# Patient Record
Sex: FEMALE | Race: WHITE | NOT HISPANIC OR LATINO | Employment: FULL TIME | URBAN - METROPOLITAN AREA
[De-identification: names, ages, dates, MRNs, and addresses within clinical notes are randomized per-mention and may not be internally consistent; named-entity substitution may affect disease eponyms.]

---

## 2019-08-21 ENCOUNTER — TELEPHONE (OUTPATIENT)
Dept: BARIATRICS | Facility: CLINIC | Age: 44
End: 2019-08-21

## 2019-08-21 NOTE — TELEPHONE ENCOUNTER
----- Message from Brandy López sent at 8/21/2019  3:49 PM CDT -----  Contact: Self  Type:  Patient Returning Call    Who Called:  Patient   Who Left Message for Patient:  Ines  Does the patient know what this is regarding?:  scheduling  Best Call Back Number:  [phone] (home)   Additional Information: na

## 2019-08-21 NOTE — TELEPHONE ENCOUNTER
Called pt, scheduled appt. had lap band placed by meghana 4 years ago, wants out. confirmed location.

## 2019-09-11 ENCOUNTER — OFFICE VISIT (OUTPATIENT)
Dept: BARIATRICS | Facility: CLINIC | Age: 44
End: 2019-09-11
Payer: COMMERCIAL

## 2019-09-11 VITALS
HEIGHT: 66 IN | SYSTOLIC BLOOD PRESSURE: 118 MMHG | HEART RATE: 54 BPM | BODY MASS INDEX: 30.93 KG/M2 | TEMPERATURE: 98 F | RESPIRATION RATE: 16 BRPM | WEIGHT: 192.44 LBS | DIASTOLIC BLOOD PRESSURE: 77 MMHG

## 2019-09-11 DIAGNOSIS — Z98.890 S/P GASTRIC SURGERY: ICD-10-CM

## 2019-09-11 DIAGNOSIS — E66.9 OBESITY (BMI 30.0-34.9): Primary | ICD-10-CM

## 2019-09-11 DIAGNOSIS — R63.5 WEIGHT GAIN: Primary | ICD-10-CM

## 2019-09-11 PROCEDURE — 99203 PR OFFICE/OUTPT VISIT, NEW, LEVL III, 30-44 MIN: ICD-10-PCS | Mod: S$GLB,,, | Performed by: SURGERY

## 2019-09-11 PROCEDURE — 3008F PR BODY MASS INDEX (BMI) DOCUMENTED: ICD-10-PCS | Mod: CPTII,S$GLB,, | Performed by: SURGERY

## 2019-09-11 PROCEDURE — 99999 PR PBB SHADOW E&M-EST. PATIENT-LVL III: CPT | Mod: PBBFAC,,, | Performed by: SURGERY

## 2019-09-11 PROCEDURE — 3008F BODY MASS INDEX DOCD: CPT | Mod: CPTII,S$GLB,, | Performed by: SURGERY

## 2019-09-11 PROCEDURE — 99999 PR PBB SHADOW E&M-EST. PATIENT-LVL III: ICD-10-PCS | Mod: PBBFAC,,, | Performed by: SURGERY

## 2019-09-11 PROCEDURE — 99203 OFFICE O/P NEW LOW 30 MIN: CPT | Mod: S$GLB,,, | Performed by: SURGERY

## 2019-09-11 NOTE — PROGRESS NOTES
Initial Consult    Chief Complaint   Patient presents with    Obesity       History of Present Illness:  Patient is a 44 y.o. female who is referred for evaluation obesity. Her Body mass index is 31.54 kg/m². She has no known comorbidities related to her weight. She has attended the bariatric seminar and is most interested in band adjustments.    Lap band: 2015  Dr. Frias  Starting 265 pounds  Lowest weight 180 pounds  Current weight 192 pounds  Goal weight: 160 pounds    Filled 3 times, never unfilled     No restriction- portion sizes around 6 ounces  No reflux, no regurgitation      Review of patient's allergies indicates:   Allergen Reactions    Benadryl [diphenhydramine hcl] Hives    Chocolate flavor Nausea And Vomiting     HIVES    Codeine Nausea And Vomiting     HIVES       No current outpatient medications on file.     No current facility-administered medications for this visit.        History reviewed. No pertinent past medical history.  Past Surgical History:   Procedure Laterality Date    BREAST REDUCTION      BREAST SURGERY      KNEE SURGERY      LAPAROSCOPIC GASTRIC BANDING       Family History   Problem Relation Age of Onset    Heart disease Mother     Diabetes Mother     Obesity Mother     Diabetes Maternal Aunt     Diabetes Maternal Uncle     Diabetes Paternal Aunt     Diabetes Paternal Uncle     Cancer Maternal Grandmother     Cancer Maternal Grandfather     Cancer Paternal Grandmother     Cancer Paternal Grandfather     Obesity Sister     Obesity Sister      Social History     Tobacco Use    Smoking status: Former Smoker    Smokeless tobacco: Never Used   Substance Use Topics    Alcohol use: Yes    Drug use: Never        Review of Systems:  Review of Systems   Constitutional: Negative for chills, diaphoresis and fever.   HENT: Negative for congestion, sinus pressure, sneezing, sore throat, tinnitus and voice change.    Eyes: Negative for pain, redness and itching.  "  Respiratory: Negative for apnea, cough, choking, chest tightness, shortness of breath, wheezing and stridor.    Cardiovascular: Negative for chest pain, palpitations and leg swelling.   Gastrointestinal: Negative for abdominal pain, anal bleeding, constipation, diarrhea, nausea and vomiting.   Endocrine: Negative for cold intolerance and heat intolerance.   Genitourinary: Negative for difficulty urinating and dysuria.   Musculoskeletal: Negative for arthralgias, back pain and gait problem.   Skin: Negative for rash and wound.   Allergic/Immunologic: Negative for environmental allergies and food allergies.   Neurological: Negative for dizziness, light-headedness and headaches.   Hematological: Negative for adenopathy. Does not bruise/bleed easily.   Psychiatric/Behavioral: Negative for agitation and confusion.       Physical:     Vital Signs (Most Recent)  Temp: 98.1 °F (36.7 °C) (09/11/19 1050)  Pulse: (!) 54 (09/11/19 1050)  Resp: 16 (09/11/19 1050)  BP: 118/77 (09/11/19 1050)  5' 5.5" (1.664 m)  87.3 kg (192 lb 7.4 oz)     Body comp:  Fat Percent:  41.7 %  Fat Mass:  79 lb  FFM:  110.6 lb  TBW: 79 lb  TBW %:  41.7 %  BMR: 1540 kcal          Physical Exam:  Physical Exam   Constitutional: She is oriented to person, place, and time. She appears well-developed and well-nourished. No distress.   HENT:   Head: Normocephalic and atraumatic.   Mouth/Throat: No oropharyngeal exudate.   Eyes: Pupils are equal, round, and reactive to light. Conjunctivae and EOM are normal. No scleral icterus.   Neck: Normal range of motion. Neck supple. No JVD present. No tracheal deviation present. No thyromegaly present.   Cardiovascular: Normal rate, regular rhythm and normal heart sounds. Exam reveals no gallop and no friction rub.   No murmur heard.  Pulmonary/Chest: Effort normal and breath sounds normal. No stridor. No respiratory distress. She has no wheezes. She has no rales. She exhibits no tenderness.   Abdominal: Soft. Bowel " sounds are normal. She exhibits no distension and no mass. There is no tenderness. There is no rebound and no guarding.   Well healed incisions   Lap band in place     Musculoskeletal: Normal range of motion. She exhibits no edema or tenderness.   Lymphadenopathy:     She has no cervical adenopathy.   Neurological: She is alert and oriented to person, place, and time. No cranial nerve deficit.   Skin: Skin is warm and dry. No rash noted. She is not diaphoretic. No erythema.   Psychiatric: She has a normal mood and affect. Her behavior is normal.   Nursing note and vitals reviewed.      ASSESSMENT/PLAN:        1. Obesity (BMI 30.0-34.9)     2. S/P gastric surgery         Plan:  Nano White has obesity as their Body mass index is 31.54 kg/m².   She has a lap band and wants to start using it for weight loss again.  Her goal is 160 pounds.  She has a hx of morbid obesity being as high 265 pounds and she would like to avoid regaining any more weight.  I have explained the dieting process and while evaluated band.    She will need:    UGI possible band adjustment    Diet plan: high protein low carb- mainly meats and vegetables  Exercise plan: Cardiovascular exercise, get HR over 100 for 20 minutes 3 times per week.  Start multivitamin

## 2019-09-20 ENCOUNTER — TELEPHONE (OUTPATIENT)
Dept: BARIATRICS | Facility: CLINIC | Age: 44
End: 2019-09-20

## 2019-09-20 ENCOUNTER — HOSPITAL ENCOUNTER (OUTPATIENT)
Dept: RADIOLOGY | Facility: HOSPITAL | Age: 44
Discharge: HOME | End: 2019-09-20
Attending: SURGERY
Payer: COMMERCIAL

## 2019-09-20 DIAGNOSIS — R63.5 WEIGHT GAIN: ICD-10-CM

## 2019-09-20 NOTE — TELEPHONE ENCOUNTER
----- Message from Natalie Pride sent at 9/20/2019 12:20 PM CDT -----  Contact: patient  Patient calling to reschedule xray-   Stated she usually schedules through the office so is requesting a call back to sched at    [phone]

## 2019-09-30 ENCOUNTER — HOSPITAL ENCOUNTER (OUTPATIENT)
Dept: RADIOLOGY | Facility: HOSPITAL | Age: 44
Discharge: HOME | End: 2019-09-30
Attending: SURGERY
Payer: COMMERCIAL

## 2019-09-30 PROCEDURE — 43771 LAP REVISE GASTR ADJ DEVICE: CPT | Mod: ,,, | Performed by: RADIOLOGY

## 2019-09-30 PROCEDURE — 77002 NEEDLE LOCALIZATION BY XRAY: CPT | Mod: 26,59,, | Performed by: RADIOLOGY

## 2019-09-30 PROCEDURE — 77002 FL LAP BAND ADJUSTMENT UNDER FLUORO(ADDL PROC): ICD-10-PCS | Mod: 26,59,, | Performed by: RADIOLOGY

## 2019-09-30 PROCEDURE — 43771: ICD-10-PCS | Mod: ,,, | Performed by: RADIOLOGY

## 2019-09-30 PROCEDURE — 77002 NEEDLE LOCALIZATION BY XRAY: CPT | Mod: TC

## 2019-10-07 NOTE — PROGRESS NOTES
Date: 10/07/2019    Procedure: lap band adjustment    Indications: weight gain    Anesthesia: none     Findings: good restriction after adjustment    Procedure:  The band was found with the fluoroscope and directly accessed with a long hahn needle with fluoroscopic guidance.  The initial UGI showed the band in good position with little restriction of barium flow across the band.  The restriction improved after addition fluid  without signs of reflux or obstruction.  Patient was instructed to have liquids only for 48 hours.  The needle was removed and bandaid placed.    Dispo: home    Instructions: water for 48 hours    Tolerated procedure well, condition: good    No complications

## 2019-10-31 ENCOUNTER — TELEPHONE (OUTPATIENT)
Dept: BARIATRICS | Facility: CLINIC | Age: 44
End: 2019-10-31

## 2019-11-05 ENCOUNTER — TELEPHONE (OUTPATIENT)
Dept: BARIATRICS | Facility: CLINIC | Age: 44
End: 2019-11-05

## 2022-08-03 ENCOUNTER — OFFICE VISIT (OUTPATIENT)
Dept: SURGERY | Facility: CLINIC | Age: 47
End: 2022-08-03
Payer: COMMERCIAL

## 2022-08-03 VITALS
DIASTOLIC BLOOD PRESSURE: 83 MMHG | WEIGHT: 212.31 LBS | HEART RATE: 59 BPM | BODY MASS INDEX: 34.12 KG/M2 | TEMPERATURE: 98 F | SYSTOLIC BLOOD PRESSURE: 124 MMHG | RESPIRATION RATE: 16 BRPM | HEIGHT: 66 IN

## 2022-08-03 DIAGNOSIS — Z01.818 PRE-OP TESTING: ICD-10-CM

## 2022-08-03 DIAGNOSIS — E66.9 OBESITY (BMI 30.0-34.9): ICD-10-CM

## 2022-08-03 DIAGNOSIS — Z98.84 HX OF LAPAROSCOPIC ADJUSTABLE GASTRIC BANDING: Primary | ICD-10-CM

## 2022-08-03 PROCEDURE — 3079F PR MOST RECENT DIASTOLIC BLOOD PRESSURE 80-89 MM HG: ICD-10-PCS | Mod: CPTII,S$GLB,, | Performed by: SURGERY

## 2022-08-03 PROCEDURE — 1159F PR MEDICATION LIST DOCUMENTED IN MEDICAL RECORD: ICD-10-PCS | Mod: CPTII,S$GLB,, | Performed by: SURGERY

## 2022-08-03 PROCEDURE — 3008F BODY MASS INDEX DOCD: CPT | Mod: CPTII,S$GLB,, | Performed by: SURGERY

## 2022-08-03 PROCEDURE — 99213 OFFICE O/P EST LOW 20 MIN: CPT | Mod: S$GLB,,, | Performed by: SURGERY

## 2022-08-03 PROCEDURE — 99999 PR PBB SHADOW E&M-EST. PATIENT-LVL III: ICD-10-PCS | Mod: PBBFAC,,, | Performed by: SURGERY

## 2022-08-03 PROCEDURE — 3079F DIAST BP 80-89 MM HG: CPT | Mod: CPTII,S$GLB,, | Performed by: SURGERY

## 2022-08-03 PROCEDURE — 3074F SYST BP LT 130 MM HG: CPT | Mod: CPTII,S$GLB,, | Performed by: SURGERY

## 2022-08-03 PROCEDURE — 3074F PR MOST RECENT SYSTOLIC BLOOD PRESSURE < 130 MM HG: ICD-10-PCS | Mod: CPTII,S$GLB,, | Performed by: SURGERY

## 2022-08-03 PROCEDURE — 3008F PR BODY MASS INDEX (BMI) DOCUMENTED: ICD-10-PCS | Mod: CPTII,S$GLB,, | Performed by: SURGERY

## 2022-08-03 PROCEDURE — 99999 PR PBB SHADOW E&M-EST. PATIENT-LVL III: CPT | Mod: PBBFAC,,, | Performed by: SURGERY

## 2022-08-03 PROCEDURE — 99213 PR OFFICE/OUTPT VISIT, EST, LEVL III, 20-29 MIN: ICD-10-PCS | Mod: S$GLB,,, | Performed by: SURGERY

## 2022-08-03 PROCEDURE — 1159F MED LIST DOCD IN RCRD: CPT | Mod: CPTII,S$GLB,, | Performed by: SURGERY

## 2022-08-03 NOTE — PROGRESS NOTES
Starting to have reflux and trouble with foods getting stuck    Vitals:    08/03/22 0911   BP: 124/83   Pulse: (!) 59   Resp: 16   Temp: 98.4 °F (36.9 °C)     Abdomen benign and port in mid epigastrium    A/P    Lap band  Needs adjusted or removed  She is interested in removal and will discuss with   She will call to set date  She would like sleeve as well but does not qualify for it based on her weight.

## 2022-08-08 ENCOUNTER — TELEPHONE (OUTPATIENT)
Dept: BARIATRICS | Facility: CLINIC | Age: 47
End: 2022-08-08
Payer: COMMERCIAL

## 2022-08-08 RX ORDER — SODIUM CHLORIDE 9 MG/ML
INJECTION, SOLUTION INTRAVENOUS CONTINUOUS
Status: CANCELLED | OUTPATIENT
Start: 2022-08-09

## 2022-08-08 RX ORDER — PANTOPRAZOLE SODIUM 40 MG/10ML
40 INJECTION, POWDER, LYOPHILIZED, FOR SOLUTION INTRAVENOUS DAILY
Status: CANCELLED | OUTPATIENT
Start: 2022-08-09

## 2022-08-08 NOTE — TELEPHONE ENCOUNTER
Returned pt's call to schedule surgery. Pt agreed to date/time. Preadmit and post-op visits scheduled. Pt was requesting financial info, sent msg to  to request contacting the pt. Pt verbalized complete understanding.     ----- Message from Roselia Soni sent at 8/8/2022 10:07 AM CDT -----  Contact: Patient  Type:  Needs Medical Advice    Who Called: Patient      Would the patient rather a call back or a response via [facility]?  Call    Best Call Back Number: [phone] (home)     Additional Information: Patient needs to speak to the nurse about having her lap band removed

## 2022-08-16 ENCOUNTER — TELEPHONE (OUTPATIENT)
Dept: BARIATRICS | Facility: CLINIC | Age: 47
End: 2022-08-16
Payer: COMMERCIAL

## 2022-08-17 ENCOUNTER — HOSPITAL ENCOUNTER (OUTPATIENT)
Dept: PREADMISSION TESTING | Facility: HOSPITAL | Age: 47
Discharge: HOME | DRG: 328 | End: 2022-08-17
Attending: SURGERY
Payer: COMMERCIAL

## 2022-08-17 VITALS — BODY MASS INDEX: 32.62 KG/M2 | HEIGHT: 66 IN | WEIGHT: 203 LBS

## 2022-08-17 DIAGNOSIS — Z01.818 PREOP TESTING: Primary | ICD-10-CM

## 2022-08-17 LAB
ALBUMIN SERPL BCP-MCNC: 3.5 G/DL (ref 3.5–5.2)
ALP SERPL-CCNC: 50 U/L (ref 55–135)
ALT SERPL W/O P-5'-P-CCNC: 17 U/L (ref 10–44)
ANION GAP SERPL CALC-SCNC: 7 MMOL/L (ref 8–16)
AST SERPL-CCNC: 16 U/L (ref 10–40)
BASOPHILS # BLD AUTO: 0.04 K/UL (ref 0–0.2)
BASOPHILS NFR BLD: 0.8 % (ref 0–1.9)
BILIRUB SERPL-MCNC: 1 MG/DL (ref 0.1–1)
BUN SERPL-MCNC: 10 MG/DL (ref 6–20)
CALCIUM SERPL-MCNC: 9.3 MG/DL (ref 8.7–10.5)
CHLORIDE SERPL-SCNC: 107 MMOL/L (ref 95–110)
CO2 SERPL-SCNC: 25 MMOL/L (ref 23–29)
CREAT SERPL-MCNC: 0.7 MG/DL (ref 0.5–1.4)
DIFFERENTIAL METHOD: ABNORMAL
EOSINOPHIL # BLD AUTO: 0.1 K/UL (ref 0–0.5)
EOSINOPHIL NFR BLD: 1.4 % (ref 0–8)
ERYTHROCYTE [DISTWIDTH] IN BLOOD BY AUTOMATED COUNT: 13.9 % (ref 11.5–14.5)
EST. GFR  (NO RACE VARIABLE): >60 ML/MIN/1.73 M^2
GLUCOSE SERPL-MCNC: 89 MG/DL (ref 70–110)
HCT VFR BLD AUTO: 41.6 % (ref 37–48.5)
HGB BLD-MCNC: 14.1 G/DL (ref 12–16)
IMM GRANULOCYTES # BLD AUTO: 0.02 K/UL (ref 0–0.04)
IMM GRANULOCYTES NFR BLD AUTO: 0.4 % (ref 0–0.5)
LYMPHOCYTES # BLD AUTO: 1.3 K/UL (ref 1–4.8)
LYMPHOCYTES NFR BLD: 25 % (ref 18–48)
MCH RBC QN AUTO: 33.2 PG (ref 27–31)
MCHC RBC AUTO-ENTMCNC: 33.9 G/DL (ref 32–36)
MCV RBC AUTO: 98 FL (ref 82–98)
MONOCYTES # BLD AUTO: 0.5 K/UL (ref 0.3–1)
MONOCYTES NFR BLD: 10.2 % (ref 4–15)
NEUTROPHILS # BLD AUTO: 3.1 K/UL (ref 1.8–7.7)
NEUTROPHILS NFR BLD: 62.2 % (ref 38–73)
NRBC BLD-RTO: 0 /100 WBC
PLATELET # BLD AUTO: 247 K/UL (ref 150–450)
PMV BLD AUTO: 10.8 FL (ref 9.2–12.9)
POTASSIUM SERPL-SCNC: 3.9 MMOL/L (ref 3.5–5.1)
PROT SERPL-MCNC: 7.1 G/DL (ref 6–8.4)
RBC # BLD AUTO: 4.25 M/UL (ref 4–5.4)
SODIUM SERPL-SCNC: 139 MMOL/L (ref 136–145)
WBC # BLD AUTO: 5 K/UL (ref 3.9–12.7)

## 2022-08-17 PROCEDURE — 85025 COMPLETE CBC W/AUTO DIFF WBC: CPT | Performed by: SURGERY

## 2022-08-17 PROCEDURE — 80053 COMPREHEN METABOLIC PANEL: CPT | Performed by: SURGERY

## 2022-08-17 PROCEDURE — 36415 COLL VENOUS BLD VENIPUNCTURE: CPT | Performed by: SURGERY

## 2022-08-17 NOTE — DISCHARGE INSTRUCTIONS
To confirm, Your doctor has instructed you that surgery is scheduled for: 8/19/22   [place]  [phone]    Please report to [facility] the morning of surgery. You must check-in and receive a wristband before going to your procedure.    Pre-Op will call the afternoon prior to surgery between 1:00 and 6:00 PM with the final arrival time.  Phone number: [phone]    PLEASE NOTE:  The surgery schedule has many variables which may affect the time of your surgery case.  Family members should be available if your surgery time changes.  Plan to be here the day of your procedure between 4-6 hours.    MEDICATIONS:  TAKE ONLY THESE MEDICATIONS WITH A SMALL SIP OF WATER THE MORNING OF YOUR PROCEDURE    -0-    DO NOT TAKE THESE MEDICATIONS 5-7 DAYS PRIOR to your procedure or per your surgeon's request:   ASPIRIN, ALEVE, ADVIL, IBUPROFEN, FISH OIL VITAMIN E, HERBALS    (May take Tylenol)    ONLY if you are prescribed any types of blood thinners such as:  Aspirin, Coumadin, Plavix, Pradaxa, Xarelto, Aggrenox, Effient, Eliquis, Savasya, Brilinta, or any other, ask your surgeon whether you should stop taking them and how long before surgery you should stop.  You may also need to verify with the prescribing physician if it is ok to stop your medication.      INSTRUCTIONS IMPORTANT!!  Do not eat or drink anything between midnight and the time of your procedure- this includes gum, mints, and candy.  Do not smoke or drink alcoholic beverages 24 hours prior to your procedure.  Shower the night before AND the morning of your procedure with a Chlorhexidine wash such as Hibiclens or Dial antibacterial soap from the neck down.  Do not get it on your face or in your eyes.  You may use your own shampoo and face wash. This helps your skin to be as bacteria free as possible.    If you wear contact lenses, dentures, hearing aids or glasses, bring a container to put them in during surgery and give to a  family member for safe keeping.  Please leave all jewelry, piercing's and valuables at home.   DO NOT remove hair from the surgery site.  Do not shave the incision site unless you are given specific instructions to do so.    ONLY if you have been diagnosed with sleep apnea please bring your C-PAP machine.  ONLY if you wear home oxygen please bring your portable oxygen tank the day of your procedure.  ONLY if you have a history of OPEN HEART SURGERY you will need a clearance from your Cardiologist per Anesthesia.      ONLY for patients requiring bowel prep, written instructions will be given by your doctor's office.  ONLY if you have a neuro stimulator, please bring the controller with you the morning of surgery  ONLY if a type and screen test is needed before surgery, please return:  If your doctor has scheduled you for an overnight stay, bring a small overnight bag with any personal items you need.  Make arrangements in advance for transportation home by a responsible adult.  It is not safe to drive a vehicle during the 24 hours after anesthesia.       [facility] Visitor Policy  Effective July 25, 2022    [facility] will make masks available at entrances and will enforce mask wearing in all common and patient  care areas for employees, patients, and visitors. Masks must be worn properly, ensuring that the nose and mouth  are covered. Children under 2 years of age are excluded from this requirement.    [facility] will continue routine visitation for COVID-19 negative patients, including inpatients, outpatients, and  procedural areas. Visitors will be asked to leave if they exhibit symptoms of respiratory infection, have tested  positive for COVID -19 in the last ten days, have a pending COVID-19 test for symptoms, are unable or refuse  to wear a mask OR do not comply with current policy    All [facility] facilities and properties are tobacco free.  Smoking is NOT allowed.   If you have any questions about these  instructions, call Pre-Op Admit  Nursing at [phone] or the Pre-Op Day Surgery Unit at [phone].

## 2022-08-18 ENCOUNTER — ANESTHESIA EVENT (OUTPATIENT)
Dept: SURGERY | Facility: HOSPITAL | Age: 47
DRG: 328 | End: 2022-08-18
Payer: COMMERCIAL

## 2022-08-19 ENCOUNTER — HOSPITAL ENCOUNTER (OUTPATIENT)
Facility: HOSPITAL | Age: 47
Discharge: HOME | DRG: 328 | End: 2022-08-19
Attending: SURGERY | Admitting: SURGERY
Payer: COMMERCIAL

## 2022-08-19 ENCOUNTER — ANESTHESIA (OUTPATIENT)
Dept: SURGERY | Facility: HOSPITAL | Age: 47
DRG: 328 | End: 2022-08-19
Payer: COMMERCIAL

## 2022-08-19 VITALS
DIASTOLIC BLOOD PRESSURE: 66 MMHG | OXYGEN SATURATION: 100 % | WEIGHT: 208 LBS | SYSTOLIC BLOOD PRESSURE: 145 MMHG | HEART RATE: 50 BPM | TEMPERATURE: 98 F | HEIGHT: 65 IN | RESPIRATION RATE: 16 BRPM | BODY MASS INDEX: 34.66 KG/M2

## 2022-08-19 DIAGNOSIS — Z98.84 HX OF LAPAROSCOPIC ADJUSTABLE GASTRIC BANDING: ICD-10-CM

## 2022-08-19 DIAGNOSIS — Z01.818 PREOP TESTING: ICD-10-CM

## 2022-08-19 LAB
B-HCG UR QL: NEGATIVE
CTP QC/QA: YES

## 2022-08-19 PROCEDURE — C9113 INJ PANTOPRAZOLE SODIUM, VIA: HCPCS | Performed by: NURSE PRACTITIONER

## 2022-08-19 PROCEDURE — 36000708 HC OR TIME LEV III 1ST 15 MIN: Performed by: SURGERY

## 2022-08-19 PROCEDURE — 81025 URINE PREGNANCY TEST: CPT | Performed by: SURGERY

## 2022-08-19 PROCEDURE — 63600175 PHARM REV CODE 636 W HCPCS: Performed by: NURSE ANESTHETIST, CERTIFIED REGISTERED

## 2022-08-19 PROCEDURE — 27201423 OPTIME MED/SURG SUP & DEVICES STERILE SUPPLY: Performed by: SURGERY

## 2022-08-19 PROCEDURE — 25000003 PHARM REV CODE 250: Performed by: SURGERY

## 2022-08-19 PROCEDURE — 63600175 PHARM REV CODE 636 W HCPCS: Performed by: NURSE PRACTITIONER

## 2022-08-19 PROCEDURE — 71000033 HC RECOVERY, INTIAL HOUR: Performed by: SURGERY

## 2022-08-19 PROCEDURE — 99900103 DSU ONLY-NO CHARGE-INITIAL HR (STAT): Performed by: SURGERY

## 2022-08-19 PROCEDURE — 71000039 HC RECOVERY, EACH ADD'L HOUR: Performed by: SURGERY

## 2022-08-19 PROCEDURE — 25000003 PHARM REV CODE 250: Performed by: NURSE ANESTHETIST, CERTIFIED REGISTERED

## 2022-08-19 PROCEDURE — D9220A PRA ANESTHESIA: ICD-10-PCS | Mod: CRNA,,, | Performed by: NURSE ANESTHETIST, CERTIFIED REGISTERED

## 2022-08-19 PROCEDURE — 36000709 HC OR TIME LEV III EA ADD 15 MIN: Performed by: SURGERY

## 2022-08-19 PROCEDURE — 99900104 DSU ONLY-NO CHARGE-EA ADD'L HR (STAT): Performed by: SURGERY

## 2022-08-19 PROCEDURE — 37000008 HC ANESTHESIA 1ST 15 MINUTES: Performed by: SURGERY

## 2022-08-19 PROCEDURE — 25000003 PHARM REV CODE 250: Performed by: ANESTHESIOLOGY

## 2022-08-19 PROCEDURE — D9220A PRA ANESTHESIA: Mod: CRNA,,, | Performed by: NURSE ANESTHETIST, CERTIFIED REGISTERED

## 2022-08-19 PROCEDURE — 43774 PR LAP, REMOVE ADJUST GAST RESTRICT DEVICE/PORT: ICD-10-PCS | Mod: ,,, | Performed by: SURGERY

## 2022-08-19 PROCEDURE — 71000015 HC POSTOP RECOV 1ST HR: Performed by: SURGERY

## 2022-08-19 PROCEDURE — 11000001 HC ACUTE MED/SURG PRIVATE ROOM

## 2022-08-19 PROCEDURE — 43774 LAP RMVL GASTR ADJ ALL PARTS: CPT | Mod: ,,, | Performed by: SURGERY

## 2022-08-19 PROCEDURE — 37000009 HC ANESTHESIA EA ADD 15 MINS: Performed by: SURGERY

## 2022-08-19 PROCEDURE — D9220A PRA ANESTHESIA: Mod: ANES,,, | Performed by: ANESTHESIOLOGY

## 2022-08-19 PROCEDURE — D9220A PRA ANESTHESIA: ICD-10-PCS | Mod: ANES,,, | Performed by: ANESTHESIOLOGY

## 2022-08-19 RX ORDER — DEXAMETHASONE SODIUM PHOSPHATE 4 MG/ML
INJECTION, SOLUTION INTRA-ARTICULAR; INTRALESIONAL; INTRAMUSCULAR; INTRAVENOUS; SOFT TISSUE
Status: DISCONTINUED | OUTPATIENT
Start: 2022-08-19 | End: 2022-08-19

## 2022-08-19 RX ORDER — SODIUM CHLORIDE 9 MG/ML
INJECTION, SOLUTION INTRAVENOUS CONTINUOUS
Status: DISCONTINUED | OUTPATIENT
Start: 2022-08-19 | End: 2022-08-19

## 2022-08-19 RX ORDER — PROCHLORPERAZINE EDISYLATE 5 MG/ML
5 INJECTION INTRAMUSCULAR; INTRAVENOUS EVERY 30 MIN PRN
Status: DISCONTINUED | OUTPATIENT
Start: 2022-08-19 | End: 2022-08-19 | Stop reason: HOSPADM

## 2022-08-19 RX ORDER — PHENYLEPHRINE HYDROCHLORIDE 10 MG/ML
INJECTION INTRAVENOUS
Status: DISCONTINUED | OUTPATIENT
Start: 2022-08-19 | End: 2022-08-19

## 2022-08-19 RX ORDER — LIDOCAINE HYDROCHLORIDE 10 MG/ML
1 INJECTION, SOLUTION EPIDURAL; INFILTRATION; INTRACAUDAL; PERINEURAL ONCE
Status: DISCONTINUED | OUTPATIENT
Start: 2022-08-19 | End: 2022-08-19

## 2022-08-19 RX ORDER — LIDOCAINE HCL/PF 100 MG/5ML
SYRINGE (ML) INTRAVENOUS
Status: DISCONTINUED | OUTPATIENT
Start: 2022-08-19 | End: 2022-08-19

## 2022-08-19 RX ORDER — MIDAZOLAM HYDROCHLORIDE 1 MG/ML
INJECTION INTRAMUSCULAR; INTRAVENOUS
Status: DISCONTINUED | OUTPATIENT
Start: 2022-08-19 | End: 2022-08-19

## 2022-08-19 RX ORDER — HYDROMORPHONE HYDROCHLORIDE 2 MG/ML
0.2 INJECTION, SOLUTION INTRAMUSCULAR; INTRAVENOUS; SUBCUTANEOUS EVERY 5 MIN PRN
Status: DISCONTINUED | OUTPATIENT
Start: 2022-08-19 | End: 2022-08-19 | Stop reason: HOSPADM

## 2022-08-19 RX ORDER — SUCCINYLCHOLINE CHLORIDE 20 MG/ML
INJECTION INTRAMUSCULAR; INTRAVENOUS
Status: DISCONTINUED | OUTPATIENT
Start: 2022-08-19 | End: 2022-08-19

## 2022-08-19 RX ORDER — PROPOFOL 10 MG/ML
VIAL (ML) INTRAVENOUS
Status: DISCONTINUED | OUTPATIENT
Start: 2022-08-19 | End: 2022-08-19

## 2022-08-19 RX ORDER — OXYCODONE HYDROCHLORIDE 5 MG/1
5 TABLET ORAL
Status: DISCONTINUED | OUTPATIENT
Start: 2022-08-19 | End: 2022-08-19 | Stop reason: HOSPADM

## 2022-08-19 RX ORDER — BUPIVACAINE HYDROCHLORIDE AND EPINEPHRINE 5; 5 MG/ML; UG/ML
INJECTION, SOLUTION EPIDURAL; INTRACAUDAL; PERINEURAL
Status: DISCONTINUED | OUTPATIENT
Start: 2022-08-19 | End: 2022-08-19 | Stop reason: HOSPADM

## 2022-08-19 RX ORDER — HYDROCODONE BITARTRATE AND ACETAMINOPHEN 7.5; 325 MG/1; MG/1
1 TABLET ORAL EVERY 4 HOURS PRN
Qty: 20 TABLET | Refills: 0 | Status: SHIPPED | OUTPATIENT
Start: 2022-08-19 | End: 2022-09-08

## 2022-08-19 RX ORDER — KETOROLAC TROMETHAMINE 30 MG/ML
INJECTION, SOLUTION INTRAMUSCULAR; INTRAVENOUS
Status: DISCONTINUED | OUTPATIENT
Start: 2022-08-19 | End: 2022-08-19

## 2022-08-19 RX ORDER — PANTOPRAZOLE SODIUM 40 MG/10ML
40 INJECTION, POWDER, LYOPHILIZED, FOR SOLUTION INTRAVENOUS DAILY
Status: DISCONTINUED | OUTPATIENT
Start: 2022-08-19 | End: 2022-08-19 | Stop reason: HOSPADM

## 2022-08-19 RX ORDER — CEFAZOLIN SODIUM 2 G/50ML
2 SOLUTION INTRAVENOUS
Status: COMPLETED | OUTPATIENT
Start: 2022-08-19 | End: 2022-08-19

## 2022-08-19 RX ORDER — ONDANSETRON 4 MG/1
4 TABLET, ORALLY DISINTEGRATING ORAL EVERY 6 HOURS PRN
Qty: 30 TABLET | Refills: 0 | Status: SHIPPED | OUTPATIENT
Start: 2022-08-19 | End: 2022-09-08

## 2022-08-19 RX ORDER — FENTANYL CITRATE 50 UG/ML
INJECTION, SOLUTION INTRAMUSCULAR; INTRAVENOUS
Status: DISCONTINUED | OUTPATIENT
Start: 2022-08-19 | End: 2022-08-19

## 2022-08-19 RX ORDER — FENTANYL CITRATE 50 UG/ML
25 INJECTION, SOLUTION INTRAMUSCULAR; INTRAVENOUS EVERY 5 MIN PRN
Status: DISCONTINUED | OUTPATIENT
Start: 2022-08-19 | End: 2022-08-19 | Stop reason: HOSPADM

## 2022-08-19 RX ORDER — ACETAMINOPHEN 10 MG/ML
INJECTION, SOLUTION INTRAVENOUS
Status: DISCONTINUED | OUTPATIENT
Start: 2022-08-19 | End: 2022-08-19

## 2022-08-19 RX ORDER — ONDANSETRON HYDROCHLORIDE 2 MG/ML
INJECTION, SOLUTION INTRAMUSCULAR; INTRAVENOUS
Status: DISCONTINUED | OUTPATIENT
Start: 2022-08-19 | End: 2022-08-19

## 2022-08-19 RX ORDER — ROCURONIUM BROMIDE 10 MG/ML
INJECTION, SOLUTION INTRAVENOUS
Status: DISCONTINUED | OUTPATIENT
Start: 2022-08-19 | End: 2022-08-19

## 2022-08-19 RX ADMIN — SODIUM CHLORIDE, SODIUM GLUCONATE, SODIUM ACETATE, POTASSIUM CHLORIDE, MAGNESIUM CHLORIDE, SODIUM PHOSPHATE, DIBASIC, AND POTASSIUM PHOSPHATE: .53; .5; .37; .037; .03; .012; .00082 INJECTION, SOLUTION INTRAVENOUS at 09:08

## 2022-08-19 RX ADMIN — ROCURONIUM BROMIDE 30 MG: 10 INJECTION, SOLUTION INTRAVENOUS at 09:08

## 2022-08-19 RX ADMIN — PHENYLEPHRINE HYDROCHLORIDE 100 MCG: 10 INJECTION INTRAVENOUS at 09:08

## 2022-08-19 RX ADMIN — SUGAMMADEX 200 MG: 100 INJECTION, SOLUTION INTRAVENOUS at 09:08

## 2022-08-19 RX ADMIN — OXYCODONE 5 MG: 5 TABLET ORAL at 11:08

## 2022-08-19 RX ADMIN — LIDOCAINE HYDROCHLORIDE 100 MG: 20 INJECTION INTRAVENOUS at 08:08

## 2022-08-19 RX ADMIN — ACETAMINOPHEN 1000 MG: 10 INJECTION, SOLUTION INTRAVENOUS at 09:08

## 2022-08-19 RX ADMIN — ROCURONIUM BROMIDE 10 MG: 10 INJECTION, SOLUTION INTRAVENOUS at 09:08

## 2022-08-19 RX ADMIN — DEXAMETHASONE SODIUM PHOSPHATE 4 MG: 4 INJECTION, SOLUTION INTRA-ARTICULAR; INTRALESIONAL; INTRAMUSCULAR; INTRAVENOUS; SOFT TISSUE at 09:08

## 2022-08-19 RX ADMIN — CEFAZOLIN SODIUM 2 G: 2 SOLUTION INTRAVENOUS at 09:08

## 2022-08-19 RX ADMIN — ONDANSETRON 4 MG: 2 INJECTION INTRAMUSCULAR; INTRAVENOUS at 08:08

## 2022-08-19 RX ADMIN — MIDAZOLAM HYDROCHLORIDE 2 MG: 1 INJECTION, SOLUTION INTRAMUSCULAR; INTRAVENOUS at 08:08

## 2022-08-19 RX ADMIN — SODIUM CHLORIDE, SODIUM GLUCONATE, SODIUM ACETATE, POTASSIUM CHLORIDE, MAGNESIUM CHLORIDE, SODIUM PHOSPHATE, DIBASIC, AND POTASSIUM PHOSPHATE: .53; .5; .37; .037; .03; .012; .00082 INJECTION, SOLUTION INTRAVENOUS at 07:08

## 2022-08-19 RX ADMIN — SUCCINYLCHOLINE CHLORIDE 120 MG: 20 INJECTION, SOLUTION INTRAMUSCULAR; INTRAVENOUS; PARENTERAL at 09:08

## 2022-08-19 RX ADMIN — KETOROLAC TROMETHAMINE 30 MG: 30 INJECTION, SOLUTION INTRAMUSCULAR; INTRAVENOUS at 09:08

## 2022-08-19 RX ADMIN — PROPOFOL 150 MG: 10 INJECTION, EMULSION INTRAVENOUS at 08:08

## 2022-08-19 RX ADMIN — FENTANYL CITRATE 100 MCG: 50 INJECTION, SOLUTION INTRAMUSCULAR; INTRAVENOUS at 08:08

## 2022-08-19 RX ADMIN — PANTOPRAZOLE SODIUM 40 MG: 40 INJECTION, POWDER, LYOPHILIZED, FOR SOLUTION INTRAVENOUS at 08:08

## 2022-08-19 NOTE — PLAN OF CARE
Patient prepared for procedure.  No questions at this time.  Family at bedside.  Belongings placed in preop cabinet.  Purse and phone with .

## 2022-08-19 NOTE — PLAN OF CARE
Criteria met for transfer per anesthesia  Awake alert and oriented  No acute resp distress  Vs wnl and stable  Pain controlled  IV patent  Bandaids/steri strips to abdomen c/d/I  Tolerating po with no PONV  Bedside report given to RUSSEL Ashby

## 2022-08-19 NOTE — DISCHARGE SUMMARY
[facility]  Discharge Note  Short Stay    Procedure(s) (LRB):  REMOVAL, GASTRIC BAND, LAPAROSCOPIC (N/A)    OUTCOME: Patient tolerated treatment/procedure well without complication and is now ready for discharge.    DISPOSITION: Home or Self Care    FINAL DIAGNOSIS:  Complication of lap band    FOLLOWUP: In clinic    DISCHARGE INSTRUCTIONS:    Discharge Procedure Orders   Diet Adult Regular     Lifting restrictions     Ice to affected area     Notify your health care provider if you experience any of the following:  temperature >100.4     Notify your health care provider if you experience any of the following:  persistent nausea and vomiting or diarrhea     Notify your health care provider if you experience any of the following:  severe uncontrolled pain     Notify your health care provider if you experience any of the following:  redness, tenderness, or signs of infection (pain, swelling, redness, odor or green/yellow discharge around incision site)     Remove dressing in 48 hours     Activity as tolerated        TIME SPENT ON DISCHARGE: 5 minutes

## 2022-08-19 NOTE — DISCHARGE INSTRUCTIONS
"Remove band aids tonight  Keep white strips until they fall off  Shower over incisions daily with soap and water  Do not bath or get into a pool  Use Incentive Spirometer at Home      Discharge Instructions: After Your Surgery/Procedure  Youve just had surgery. During surgery you were given medicine called anesthesia to keep you relaxed and free of pain. After surgery you may have some pain or nausea. This is common. Here are some tips for feeling better and getting well after surgery.     Stay on schedule with your medication.   Going home  Your doctor or nurse will show you how to take care of yourself when you go home. He or she will also answer your questions. Have an adult family member or friend drive you home.      For your safety we recommend these precaution for the first 24 hours after your procedure:  Do not drive or use heavy equipment.  Do not make important decisions or sign legal papers.  Do not drink alcohol.  Have someone stay with you, if needed. He or she can watch for problems and help keep you safe.  Your concentration, balance, coordination, and judgement may be impaired for many hours after anesthesia.  Use caution when ambulating or standing up.     You may feel weak and "washed out" after anesthesia and surgery.      Subtle residual effects of general anesthesia or sedation with regional / local anesthesia can last more than 24 hours.  Rest for the remainder of the day or longer if your Doctor/Surgeon has advised you to do so.  Although you may feel normal within the first 24 hours, your reflexes and mental ability may be impaired without you realizing it.  You may feel dizzy, lightheaded or sleepy for 24 hours or longer.      Be sure to go to all follow-up visits with your doctor. And rest after your surgery for as long as your doctor tells you to.  Coping with pain  If you have pain after surgery, pain medicine will help you feel better. Take it as told, before pain becomes severe. Also, " ask your doctor or pharmacist about other ways to control pain. This might be with heat, ice, or relaxation. And follow any other instructions your surgeon or nurse gives you.  Tips for taking pain medicine  To get the best relief possible, remember these points:  Pain medicines can upset your stomach. Taking them with a little food may help.  Most pain relievers taken by mouth need at least 20 to 30 minutes to start to work.  Taking medicine on a schedule can help you remember to take it. Try to time your medicine so that you can take it before starting an activity. This might be before you get dressed, go for a walk, or sit down for dinner.  Constipation is a common side effect of pain medicines. Call your doctor before taking any medicines such as laxatives or stool softeners to help ease constipation. Also ask if you should skip any foods. Drinking lots of fluids and eating foods such as fruits and vegetables that are high in fiber can also help. Remember, do not take laxatives unless your surgeon has prescribed them.  Drinking alcohol and taking pain medicine can cause dizziness and slow your breathing. It can even be deadly. Do not drink alcohol while taking pain medicine.  Pain medicine can make you react more slowly to things. Do not drive or run machinery while taking pain medicine.  Your health care provider may tell you to take acetaminophen to help ease your pain. Ask him or her how much you are supposed to take each day. Acetaminophen or other pain relievers may interact with your prescription medicines or other over-the-counter (OTC) drugs. Some prescription medicines have acetaminophen and other ingredients. Using both prescription and OTC acetaminophen for pain can cause you to overdose. Read the labels on your OTC medicines with care. This will help you to clearly know the list of ingredients, how much to take, and any warnings. It may also help you not take too much acetaminophen. If you have  questions or do not understand the information, ask your pharmacist or health care provider to explain it to you before you take the OTC medicine.  Managing nausea  Some people have an upset stomach after surgery. This is often because of anesthesia, pain, or pain medicine, or the stress of surgery. These tips will help you handle nausea and eat healthy foods as you get better. If you were on a special food plan before surgery, ask your doctor if you should follow it while you get better. These tips may help:  Do not push yourself to eat. Your body will tell you when to eat and how much.  Start off with clear liquids and soup. They are easier to digest.  Next try semi-solid foods, such as mashed potatoes, applesauce, and gelatin, as you feel ready.  Slowly move to solid foods. Dont eat fatty, rich, or spicy foods at first.  Do not force yourself to have 3 large meals a day. Instead eat smaller amounts more often.  Take pain medicines with a small amount of solid food, such as crackers or toast, to avoid nausea.     Call your surgeon if  You still have pain an hour after taking medicine. The medicine may not be strong enough.  You feel too sleepy, dizzy, or groggy. The medicine may be too strong.  You have side effects like nausea, vomiting, or skin changes, such as rash, itching, or hives.       If you have obstructive sleep apnea  You were given anesthesia medicine during surgery to keep you comfortable and free of pain. After surgery, you may have more apnea spells because of this medicine and other medicines you were given. The spells may last longer than usual.   At home:  Keep using the continuous positive airway pressure (CPAP) device when you sleep. Unless your health care provider tells you not to, use it when you sleep, day or night. CPAP is a common device used to treat obstructive sleep apnea.  Talk with your provider before taking any pain medicine, muscle relaxants, or sedatives. Your provider will  tell you about the possible dangers of taking these medicines.  © 2517-4693 [employer]. [address] [place] [address], [place] [address]. All rights reserved. This information is not intended as a substitute for professional medical care. Always follow your healthcare professional's instructions.     Using an Incentive Spirometer    An incentive spirometer is a device that helps you do deep breathing exercises. These exercises expand your lungs, aid in circulation, and help prevent pneumonia. Deep breathing exercises also help you breathe better and improve the function of your lungs by:  Keeping your lungs clear  Strengthening your breathing muscles  Helping prevent respiratory complications or problems  The incentive spirometer gives you a way to take an active part in recover. A nurse or therapist will teach you breathing exercises. To do these exercises, you will breathe in through your mouth and not your nose. The incentive spirometer only works correctly if you breathe in through your mouth.  Steps to clear lungs  Step 1. Exhale normally. Then, inhale normally.  Relax and breathe out.  Step 2. Place your lips tightly around the mouthpiece.  Make sure the device is upright and not tilted.  Step 3. Inhale as much air as you can through the mouthpiece (don't breath through your nose).  Inhale slowly and deeply.  Hold your breath long enough to keep the balls or disk raised for at least 3 to 5 seconds, or as instructed by your healthcare provider.  Some spirometers have an indicator to let you know that you are breathing in too fast. If the indicator goes off, breathe in more slowly.  Step 4. Repeat the exercise regularly.  Do this exercise every hour while you're awake, or as instructed by your healthcare provider.  If you were taught deep breathing and coughing exercises, do them regularly as instructed by your healthcare provider.      Post op instructions for prevention of DVT  What is deep vein  thrombosis?  Deep vein thrombosis (DVT) is the medical term for blood clots in the deep veins of the leg.  These blood clots can be dangerous.  A DVT can block a blood vessel and keep blood from getting where it needs to go.  Another problem is that the clot can travel to other parts of the body such as the lungs.  A clot that travels to the lungs is called a pulmonary embolus (PE) and can cause serious problems with breathing which can lead to death.  Am I at risk for DVT/PE?  If you are not very active, you are at risk of DVT.  Anyone confined to bed, sitting for long periods of time, recovering from surgery, etc. increases the risk of DVT.  Other risk factors are cancer diagnosis, certain medications, estrogen replacement in any form,older age, obesity, pregnancy, smoking, history of clotting disorders, and dehydration.  How will I know if I have a DVT?  Swelling in the lower leg  Pain  Warmth, redness, hardness or bulging of the vein  If you have any of these symptoms, call your doctors office right away.  Some people will not have any symptoms until the clot moves to the lungs.  What are the symptoms of a PE?  Panting, shortness of breath, or trouble breathing  Sharp, knife-like chest pain when you breathe  Coughing or coughing up blood  Rapid heartbeat  If you have any of these symptoms or get worse quickly, call 911 for emergency treatment.  How can I prevent a DVT?  Avoid long periods of inactivity and dont cross your legs--get up and walk around every hour or so.  Stay active--walking after surgery is highly encouraged.  This means you should get out of the house and walk in the neighborhood.  Going up and down stairs will not impair healing (unless advised against such activity by your doctor).    Drink plenty of noncaffeinated, nonalcoholic fluids each day to prevent dehydration.  Wear special support stockings, if they have been advised by your doctor.  If you travel, stop at least once an hour and  walk around.  Avoid smoking (assistance with stopping is available through your healthcare provider)  Always notify your doctor if you are not able to follow the post operative instructions that are given to you at the time of discharge.  It may be necessary to prescribe one of the medications available to prevent DVT.     We hope your stay was comfortable as you heal now, mend and rest.    For we have enjoyed taking care of you by giving your our best.    And as you get better, by regaining your health and strength;   We count it as a privilege to have served you and hope your time at [facility] was well spent.      Thank  You!!!

## 2022-08-19 NOTE — ANESTHESIA POSTPROCEDURE EVALUATION
Anesthesia Post Evaluation    Patient: Nano White    Procedure(s) Performed: Procedure(s) (LRB):  REMOVAL, GASTRIC BAND, LAPAROSCOPIC (N/A)    Final Anesthesia Type: general      Patient location during evaluation: PACU  Patient participation: Yes- Able to Participate  Level of consciousness: awake and alert and oriented  Post-procedure vital signs: reviewed and stable  Pain management: adequate  Airway patency: patent    PONV status at discharge: No PONV  Anesthetic complications: no      Cardiovascular status: blood pressure returned to baseline  Respiratory status: unassisted, spontaneous ventilation and room air  Hydration status: euvolemic  Follow-up not needed.          Vitals Value Taken Time   /62 08/19/22 1110   Temp 36.6 °C (97.9 °F) 08/19/22 1025   Pulse 50 08/19/22 1113   Resp 33 08/19/22 1113   SpO2 95 % 08/19/22 1113   Vitals shown include unvalidated device data.      No case tracking events are documented in the log.      Pain/Suzanne Score: Suzanne Score: 9 (8/19/2022 10:45 AM)

## 2022-08-19 NOTE — ANESTHESIA PREPROCEDURE EVALUATION
08/19/2022  Nano White is a 47 y.o., female.      Pre-op Assessment    I have reviewed the Patient Summary Reports.     I have reviewed the Nursing Notes. I have reviewed the NPO Status.   I have reviewed the Medications.     Review of Systems  Anesthesia Hx:  No problems with previous Anesthesia Denies Hx of Anesthetic complications    Social:  Smoker    Cardiovascular:   Denies Hypertension.  Denies MI.  Denies CAD.    Denies CABG/stent.   Denies Angina.    Pulmonary:   Denies COPD.  Denies Asthma.  Denies Recent URI.    Renal/:   Denies Chronic Renal Disease.     Hepatic/GI:   Denies GERD. Denies Liver Disease.    Neurological:   Denies TIA. Denies CVA. Denies Seizures.    Endocrine:   Denies Diabetes. Denies Hypothyroidism.  Obesity / BMI > 30  Psych:   Denies Psychiatric History.          Physical Exam  General: Well nourished, Cooperative, Alert and Oriented    Airway:  Mallampati: II / II  Mouth Opening: Normal  TM Distance: 4 - 6 cm  Tongue: Normal    Dental:  Intact    Chest/Lungs:  Clear to auscultation, Normal Respiratory Rate    Heart:  Rate: Normal  Rhythm: Regular Rhythm  Sounds: Normal        Anesthesia Plan  Type of Anesthesia, risks & benefits discussed:    Anesthesia Type: Gen Natural Airway  Intra-op Monitoring Plan: Standard ASA Monitors  Induction:  IV  Informed Consent: Informed consent signed with the Patient and all parties understand the risks and agree with anesthesia plan.  All questions answered.   ASA Score: 2    Ready For Surgery From Anesthesia Perspective.     .

## 2022-08-19 NOTE — ANESTHESIA PROCEDURE NOTES
Intubation    Date/Time: 8/19/2022 9:00 AM  Performed by: Gurpreet Murphy Jr., CRNA  Authorized by: Damien Pacheco MD     Intubation:     Induction:  Intravenous    Intubated:  Postinduction    Mask Ventilation:  Easy mask    Attempts:  1    Attempted By:  CRNA    Method of Intubation:  Direct    Blade:  Jaqeuz 2    Laryngeal View Grade: Grade I - full view of cords      Difficult Airway Encountered?: No      Complications:  None    Airway Device:  Oral endotracheal tube    Airway Device Size:  7.5    Style/Cuff Inflation:  Cuffed (inflated to minimal occlusive pressure)    Inflation Amount (mL):  4    Tube secured:  22    Secured at:  The lips    Placement Verified By:  Capnometry    Complicating Factors:  None    Findings Post-Intubation:  BS equal bilateral and atraumatic/condition of teeth unchanged

## 2022-08-19 NOTE — PLAN OF CARE
Pt in phase II rec rates pain in abd 5/10  at bedside explained dc [id number] to pt and  both verb understanding  Will wheel to car via xavier clemons pt voids

## 2022-08-19 NOTE — H&P
Initial Consult         Chief Complaint   Patient presents with    Obesity         History of Present Illness:  Patient is a 44 y.o. female who has a lap band which is causing dysphagia and reflux.            Review of patient's allergies indicates:   Allergen Reactions    Benadryl [diphenhydramine hcl] Hives    Chocolate flavor Nausea And Vomiting       HIVES    Codeine Nausea And Vomiting       HIVES         Current Medications   No current outpatient medications on file.      No current facility-administered medications for this visit.             History reviewed. No pertinent past medical history.        Past Surgical History:   Procedure Laterality Date    BREAST REDUCTION        BREAST SURGERY        KNEE SURGERY        LAPAROSCOPIC GASTRIC BANDING                Family History   Problem Relation Age of Onset    Heart disease Mother      Diabetes Mother      Obesity Mother      Diabetes Maternal Aunt      Diabetes Maternal Uncle      Diabetes Paternal Aunt      Diabetes Paternal Uncle      Cancer Maternal Grandmother      Cancer Maternal Grandfather      Cancer Paternal Grandmother      Cancer Paternal Grandfather      Obesity Sister      Obesity Sister        Social History      Tobacco Use    Smoking status: Former Smoker    Smokeless tobacco: Never Used   Substance Use Topics    Alcohol use: Yes    Drug use: Never         Review of Systems:  Review of Systems   Constitutional: Negative for chills, diaphoresis and fever.   HENT: Negative for congestion, sinus pressure, sneezing, sore throat, tinnitus and voice change.    Eyes: Negative for pain, redness and itching.   Respiratory: Negative for apnea, cough, choking, chest tightness, shortness of breath, wheezing and stridor.    Cardiovascular: Negative for chest pain, palpitations and leg swelling.   Gastrointestinal: Negative for abdominal pain, anal bleeding, constipation, diarrhea, nausea and vomiting.   Endocrine: Negative for  "cold intolerance and heat intolerance.   Genitourinary: Negative for difficulty urinating and dysuria.   Musculoskeletal: Negative for arthralgias, back pain and gait problem.   Skin: Negative for rash and wound.   Allergic/Immunologic: Negative for environmental allergies and food allergies.   Neurological: Negative for dizziness, light-headedness and headaches.   Hematological: Negative for adenopathy. Does not bruise/bleed easily.   Psychiatric/Behavioral: Negative for agitation and confusion.         Physical:      Vital Signs (Most Recent)  Temp: 98.1 °F (36.7 °C) (09/11/19 1050)  Pulse: (!) 54 (09/11/19 1050)  Resp: 16 (09/11/19 1050)  BP: 118/77 (09/11/19 1050)  5' 5.5" (1.664 m)  87.3 kg (192 lb 7.4 oz)      Body comp:  Fat Percent:  41.7 %  Fat Mass:  79 lb  FFM:  110.6 lb  TBW: 79 lb  TBW %:  41.7 %  BMR: 1540 kcal              Physical Exam:  Physical Exam   Constitutional: She is oriented to person, place, and time. She appears well-developed and well-nourished. No distress.   HENT:   Head: Normocephalic and atraumatic.   Mouth/Throat: No oropharyngeal exudate.   Eyes: Pupils are equal, round, and reactive to light. Conjunctivae and EOM are normal. No scleral icterus.   Neck: Normal range of motion. Neck supple. No JVD present. No tracheal deviation present. No thyromegaly present.   Cardiovascular: Normal rate, regular rhythm and normal heart sounds. Exam reveals no gallop and no friction rub.   No murmur heard.  Pulmonary/Chest: Effort normal and breath sounds normal. No stridor. No respiratory distress. She has no wheezes. She has no rales. She exhibits no tenderness.   Abdominal: Soft. Bowel sounds are normal. She exhibits no distension and no mass. There is no tenderness. There is no rebound and no guarding.   Well healed incisions   Lap band in place     Musculoskeletal: Normal range of motion. She exhibits no edema or tenderness.   Lymphadenopathy:     She has no cervical adenopathy. "   Neurological: She is alert and oriented to person, place, and time. No cranial nerve deficit.   Skin: Skin is warm and dry. No rash noted. She is not diaphoretic. No erythema.   Psychiatric: She has a normal mood and affect. Her behavior is normal.   Nursing note and vitals reviewed.        ASSESSMENT/PLAN:      Lap band causing complications  Plan for removal.

## 2022-08-19 NOTE — TRANSFER OF CARE
"Anesthesia Transfer of Care Note    Patient: Nano White    Procedure(s) Performed: Procedure(s) (LRB):  REMOVAL, GASTRIC BAND, LAPAROSCOPIC (N/A)    Patient location: PACU    Anesthesia Type: general    Transport from OR: Transported from OR on 2-3 L/min O2 by NC with adequate spontaneous ventilation    Post pain: adequate analgesia    Post assessment: no apparent anesthetic complications and tolerated procedure well    Post vital signs: stable    Level of consciousness: awake, alert and oriented    Nausea/Vomiting: no nausea/vomiting    Complications: none    Transfer of care protocol was followed      Last vitals:   Visit Vitals  /71 (BP Location: Left arm, Patient Position: Lying)   Pulse (!) 52   Temp 36.4 °C (97.5 °F) (Skin)   Resp 20   Ht 5' 5" (1.651 m)   Wt 94.3 kg (208 lb)   LMP 08/19/2022 (Exact Date)   SpO2 99%   Breastfeeding No   BMI 34.61 kg/m²     "

## 2022-08-22 NOTE — OP NOTE
Lap band removal Procedure Note    Date of procedure:   8/19/22    Indications: 46 y/o lap band causing complication.    Pre-operative Diagnosis: complication from band    Post-operative Diagnosis: Same    Surgeon: Paresh Sampson MD    Assistants: none    Anesthesia: General endotracheal anesthesia    ASA Class: 3    Procedure Details   The patient was seen in the Holding Room. The risks, benefits, complications, treatment options, and expected outcomes were discussed with the patient. The possibilities of reaction to medication, pulmonary aspiration, perforation of viscus, bleeding, recurrent infection, the need for additional procedures, failure to diagnose a condition, and creating a complication requiring transfusion or operation were discussed with the patient. The patient concurred with the proposed plan, giving informed consent. The site of surgery properly noted/marked. The patient was taken to Operating Room  identified as Nano White and the procedure verified as lap band removal. A Time Out was held and the above information confirmed.    Full general anesthesia was induced with orotracheal intubation. The patient was prepped and draped in a supine position. Appropriate antibiotics were given intravenously. Arms were out.    Local anesthetic agent was injected into the skin near the right upper quadrant and an incision made. 5 mm optiview trocar was used to enter safely into the peritoneal cavity.  Pneumoperitoneum was then created with CO2 and tolerated well without any adverse changes in the patient's vital signs. Additional trocars were introduced under direct vision. All skin incisions were infiltrated with a local anesthetic agent before making the incision and placing the trocars.     Lap band was found with adhesions to the liver.  These were taken down using the cautery.  The band buckle was exposed.  This was cut and the band was unbuckled.  Portion of the band was then transected.  As well  as the tubing.  This placed the band in 3 pieces.  The band was then pulled from the band tunnel.  The scarring around the band was some deep was very difficult to undo the scarring.  Elected this point to leave in place.  The band tunnel was opened sharply in cautery were expected.  The band port was then identified and free from subcutaneous tissue.  It was pulled out under direct visualization to ensure no pieces of the band left behind.  The band was then read measured on the back table to make sure all portions had been removed.  The area was reinspected and the fascia was closed with Vicryl.  Skin was closed with Monocryl.  Patient tolerated the procedure well.    Instrument, sponge, and needle counts were correct prior to wound closure and at the conclusion of the case.     Findings:  Band with scarring    Estimated Blood Loss: 50.0 cc    Drains: none    Total IV Fluids: see anesthesia    Specimens: band    Implants: none    Complications:  None; patient tolerated the procedure well.    Disposition: PACU - hemodynamically stable.    Condition: stable    Attending Attestation: I was present and scrubbed for the entire procedure.

## 2022-08-23 ENCOUNTER — PATIENT OUTREACH (OUTPATIENT)
Dept: ADMINISTRATIVE | Facility: CLINIC | Age: 47
End: 2022-08-23
Payer: COMMERCIAL

## 2022-08-23 DIAGNOSIS — Z98.84 HISTORY OF REMOVAL OF LAPAROSCOPIC GASTRIC BANDING DEVICE: Primary | ICD-10-CM

## 2022-08-23 NOTE — PROGRESS NOTES
C3 nurse attempted to contact Nano White for a TCC post hospital discharge follow up call. No answer. The patient does not have a scheduled HOSFU appointment, and the pt does not have an [facility] PCP.

## 2022-08-24 ENCOUNTER — PES CALL (OUTPATIENT)
Dept: ADMINISTRATIVE | Facility: CLINIC | Age: 47
End: 2022-08-24
Payer: COMMERCIAL

## 2022-08-24 NOTE — PROGRESS NOTES
C3 nurse spoke with Nano White for a TCC post hospital discharge follow up call. The patient reports does not have a scheduled HOSFU appointment. C3 nurse was unable to schedule HOSFU appointment for Non-[facility] PCP. Patient advised to contact their PCP to schedule a HOSPFU within 5-7 days. NP referral placed

## 2022-08-30 ENCOUNTER — PES CALL (OUTPATIENT)
Dept: ADMINISTRATIVE | Facility: CLINIC | Age: 47
End: 2022-08-30
Payer: COMMERCIAL

## 2022-09-01 ENCOUNTER — PES CALL (OUTPATIENT)
Dept: ADMINISTRATIVE | Facility: CLINIC | Age: 47
End: 2022-09-01
Payer: COMMERCIAL

## 2022-09-08 ENCOUNTER — OFFICE VISIT (OUTPATIENT)
Dept: SURGERY | Facility: CLINIC | Age: 47
End: 2022-09-08
Payer: COMMERCIAL

## 2022-09-08 VITALS
DIASTOLIC BLOOD PRESSURE: 88 MMHG | BODY MASS INDEX: 34.04 KG/M2 | HEIGHT: 66 IN | HEART RATE: 58 BPM | WEIGHT: 211.81 LBS | RESPIRATION RATE: 16 BRPM | SYSTOLIC BLOOD PRESSURE: 132 MMHG | TEMPERATURE: 99 F

## 2022-09-08 DIAGNOSIS — E66.9 OBESITY (BMI 30.0-34.9): Primary | ICD-10-CM

## 2022-09-08 PROCEDURE — 3008F PR BODY MASS INDEX (BMI) DOCUMENTED: ICD-10-PCS | Mod: CPTII,S$GLB,, | Performed by: SURGERY

## 2022-09-08 PROCEDURE — 99999 PR PBB SHADOW E&M-EST. PATIENT-LVL III: ICD-10-PCS | Mod: PBBFAC,,, | Performed by: SURGERY

## 2022-09-08 PROCEDURE — 99999 PR PBB SHADOW E&M-EST. PATIENT-LVL III: CPT | Mod: PBBFAC,,, | Performed by: SURGERY

## 2022-09-08 PROCEDURE — 3079F PR MOST RECENT DIASTOLIC BLOOD PRESSURE 80-89 MM HG: ICD-10-PCS | Mod: CPTII,S$GLB,, | Performed by: SURGERY

## 2022-09-08 PROCEDURE — 3008F BODY MASS INDEX DOCD: CPT | Mod: CPTII,S$GLB,, | Performed by: SURGERY

## 2022-09-08 PROCEDURE — 99024 PR POST-OP FOLLOW-UP VISIT: ICD-10-PCS | Mod: S$GLB,,, | Performed by: SURGERY

## 2022-09-08 PROCEDURE — 3075F SYST BP GE 130 - 139MM HG: CPT | Mod: CPTII,S$GLB,, | Performed by: SURGERY

## 2022-09-08 PROCEDURE — 3079F DIAST BP 80-89 MM HG: CPT | Mod: CPTII,S$GLB,, | Performed by: SURGERY

## 2022-09-08 PROCEDURE — 3075F PR MOST RECENT SYSTOLIC BLOOD PRESS GE 130-139MM HG: ICD-10-PCS | Mod: CPTII,S$GLB,, | Performed by: SURGERY

## 2022-09-08 PROCEDURE — 99024 POSTOP FOLLOW-UP VISIT: CPT | Mod: S$GLB,,, | Performed by: SURGERY

## 2022-09-08 PROCEDURE — 1159F PR MEDICATION LIST DOCUMENTED IN MEDICAL RECORD: ICD-10-PCS | Mod: CPTII,S$GLB,, | Performed by: SURGERY

## 2022-09-08 PROCEDURE — 1159F MED LIST DOCD IN RCRD: CPT | Mod: CPTII,S$GLB,, | Performed by: SURGERY

## 2022-09-08 RX ORDER — AZELASTINE 1 MG/ML
SPRAY, METERED NASAL
COMMUNITY
Start: 2022-09-07 | End: 2022-12-05

## 2022-09-08 RX ORDER — FLUTICASONE PROPIONATE 50 MCG
SPRAY, SUSPENSION (ML) NASAL
COMMUNITY
Start: 2022-09-07 | End: 2023-02-07

## 2022-09-08 RX ORDER — TOPIRAMATE 25 MG/1
25 TABLET ORAL 2 TIMES DAILY
Qty: 60 TABLET | Refills: 0 | Status: SHIPPED | OUTPATIENT
Start: 2022-09-08 | End: 2022-12-05

## 2022-09-08 RX ORDER — BENZONATATE 100 MG/1
100 CAPSULE ORAL 3 TIMES DAILY
COMMUNITY
Start: 2022-09-07 | End: 2022-12-05

## 2022-09-08 NOTE — PROGRESS NOTES
Doing well  Eating what she wants    Vitals:    09/08/22 1633   BP: 132/88   Pulse: (!) 58   Resp: 16   Temp: 98.8 °F (37.1 °C)     Abdomen  Well healed incisions    A/P:    Start topamax  Plan for possible phentermine in about 1 month  Get on track with dieting- we went over the rules  Try to exercise at least 20 minutes 3 times per week

## 2022-09-12 ENCOUNTER — HOSPITAL ENCOUNTER (OUTPATIENT)
Dept: CARDIOLOGY | Facility: HOSPITAL | Age: 47
Discharge: HOME | End: 2022-09-12
Attending: SURGERY
Payer: COMMERCIAL

## 2022-09-12 ENCOUNTER — OFFICE VISIT (OUTPATIENT)
Dept: HOME HEALTH SERVICES | Facility: CLINIC | Age: 47
End: 2022-09-12
Payer: COMMERCIAL

## 2022-09-12 VITALS
WEIGHT: 211 LBS | HEIGHT: 66 IN | SYSTOLIC BLOOD PRESSURE: 150 MMHG | OXYGEN SATURATION: 98 % | TEMPERATURE: 98 F | RESPIRATION RATE: 16 BRPM | DIASTOLIC BLOOD PRESSURE: 70 MMHG | HEART RATE: 74 BPM | BODY MASS INDEX: 33.91 KG/M2

## 2022-09-12 DIAGNOSIS — F17.210 TOBACCO DEPENDENCE DUE TO CIGARETTES: Primary | ICD-10-CM

## 2022-09-12 DIAGNOSIS — E66.9 OBESITY (BMI 30.0-34.9): ICD-10-CM

## 2022-09-12 DIAGNOSIS — Z98.84 HISTORY OF REMOVAL OF LAPAROSCOPIC GASTRIC BANDING DEVICE: ICD-10-CM

## 2022-09-12 PROCEDURE — 93010 ELECTROCARDIOGRAM REPORT: CPT | Mod: ,,, | Performed by: INTERNAL MEDICINE

## 2022-09-12 PROCEDURE — 3078F DIAST BP <80 MM HG: CPT | Mod: CPTII,,, | Performed by: NURSE PRACTITIONER

## 2022-09-12 PROCEDURE — 93010 EKG 12-LEAD: ICD-10-PCS | Mod: ,,, | Performed by: INTERNAL MEDICINE

## 2022-09-12 PROCEDURE — 3008F BODY MASS INDEX DOCD: CPT | Mod: CPTII,,, | Performed by: NURSE PRACTITIONER

## 2022-09-12 PROCEDURE — 1111F DSCHRG MED/CURRENT MED MERGE: CPT | Mod: CPTII,,, | Performed by: NURSE PRACTITIONER

## 2022-09-12 PROCEDURE — 99350 HOME/RES VST EST HIGH MDM 60: CPT | Mod: 25,,, | Performed by: NURSE PRACTITIONER

## 2022-09-12 PROCEDURE — 3078F PR MOST RECENT DIASTOLIC BLOOD PRESSURE < 80 MM HG: ICD-10-PCS | Mod: CPTII,,, | Performed by: NURSE PRACTITIONER

## 2022-09-12 PROCEDURE — 3077F PR MOST RECENT SYSTOLIC BLOOD PRESSURE >= 140 MM HG: ICD-10-PCS | Mod: CPTII,,, | Performed by: NURSE PRACTITIONER

## 2022-09-12 PROCEDURE — 1111F PR DISCHARGE MEDS RECONCILED W/ CURRENT OUTPATIENT MED LIST: ICD-10-PCS | Mod: CPTII,,, | Performed by: NURSE PRACTITIONER

## 2022-09-12 PROCEDURE — 99406 PR TOBACCO USE CESSATION INTERMEDIATE 3-10 MINUTES: ICD-10-PCS | Mod: ,,, | Performed by: NURSE PRACTITIONER

## 2022-09-12 PROCEDURE — 3077F SYST BP >= 140 MM HG: CPT | Mod: CPTII,,, | Performed by: NURSE PRACTITIONER

## 2022-09-12 PROCEDURE — 93005 ELECTROCARDIOGRAM TRACING: CPT

## 2022-09-12 PROCEDURE — 99406 BEHAV CHNG SMOKING 3-10 MIN: CPT | Mod: ,,, | Performed by: NURSE PRACTITIONER

## 2022-09-12 PROCEDURE — 99350 PR HOME VISIT,ESTAB PATIENT,LEVEL IV: ICD-10-PCS | Mod: 25,,, | Performed by: NURSE PRACTITIONER

## 2022-09-12 PROCEDURE — 3008F PR BODY MASS INDEX (BMI) DOCUMENTED: ICD-10-PCS | Mod: CPTII,,, | Performed by: NURSE PRACTITIONER

## 2022-09-12 NOTE — ASSESSMENT & PLAN NOTE
Dangers of cigarette smoking were reviewed with patient in detail for 8 minutes and patient was encouraged to quit. Nicotine replacement options were discussed. Patient does not want to quit at this time.

## 2022-09-12 NOTE — PROGRESS NOTES
"  [facility]  Transition of Care Home Visit    Visit Date: 9/12/2022  Encounter Provider: Alanis Corona   PCP:  Zoran Bahena MD    PRESENTING HISTORY      Patient ID: Nano White is a 47 y.o. female.    Consult Requested By:  Dr. Zoran Bahena  Reason for Consult:  Hospital Follow Up.    Nano is being seen at home due to being seen at home due to physical debility that presents a taxing effort to leave the home, to mitigate high risk of hospital readmission and/or due to the limited availability of reliable or safe options for transportation to the point of access to the provider. Prior to treatment on this visit the chart was reviewed and patient verbal consent was obtained.      Chief Complaint: Transitional Care        History of Present Illness: Ms. Nano White is a 47 y.o. female who was recently admitted to the hospital.    Admit: 8/19/22  Discharge 8/19/22  Patient is a 44 y.o. female who has a lap band which is causing dysphagia and reflux.   ___________________________________________________________________    Today:    HPI:    Nano is being seen and examined at home today for transitional care visit in the home environment post-discharge from inpatient hospitalization encounter described above. Patient presents at baseline state of health as reported by patient and caregiver.     She is found at home alone today in no distress. Reports nausea has subsided since lap band removal. Reports saw surgeon 9/8/22 and is to have an EKG today for follow up. Reports some mild soreness and laproscopic sites but nothing that she needs to take medication for. Reports started back smoking cigarettes recently after quitting for 9 years, related to "stress". Not interested in quitting at this time.     VSS. Denies fever, chest pain, shortness of breath, nausea, vomiting, diarrhea. Denies any acute issues, concerns or complaints to address on today's visit. Reports taking all medications as prescribed. No " "other needs identified at this time. Risks of environmental exposure to coronavirus discussed including: social distancing, hand hygiene, and limiting departures from the home for necessities only.  Reports understanding and willingness to comply.           Review of Systems   Constitutional: Negative for chills, diaphoresis and fever.   HENT: Negative for congestion, sinus pressure, sneezing, sore throat, tinnitus and voice change.    Eyes: Negative for pain, redness and itching.   Respiratory: Negative for apnea, cough, choking, chest tightness, shortness of breath, wheezing and stridor.    Cardiovascular: Negative for chest pain, palpitations and leg swelling.   Gastrointestinal: Negative for abdominal pain, anal bleeding, constipation, diarrhea, nausea and vomiting.   Endocrine: Negative for cold intolerance and heat intolerance.   Genitourinary: Negative for difficulty urinating and dysuria.   Musculoskeletal: Negative for arthralgias, back pain and gait problem.   Skin: Negative for rash and wound.   Allergic/Immunologic: Negative for environmental allergies and food allergies.   Neurological: Negative for dizziness, light-headedness and headaches.   Hematological: Negative for adenopathy. Does not bruise/bleed easily.   Psychiatric/Behavioral: Negative for agitation and confusion.        Assessments:  Environmental: lives in single story home with her spouse. Home is clean with adequate lighting and temperature. 2 large pit bull dogs inside home.  Functional Status: independent  Safety: no issues identified  Nutritional: had adequate access, reports appetite is "getting better"  Home Health/DME/Supplies: No Home Health. No DME.    PAST HISTORY:     Past Medical History:   Diagnosis Date    H/O gastric bypass 2014       Past Surgical History:   Procedure Laterality Date    BREAST REDUCTION      BREAST SURGERY      KNEE SURGERY      LAPAROSCOPIC GASTRIC BANDING      LAPAROSCOPIC REMOVAL OF GASTRIC BAND N/A " 8/19/2022    Procedure: REMOVAL, GASTRIC BAND, LAPAROSCOPIC;  Surgeon: Paresh Sampson MD;  Location: [facility] OR;  Service: General;  Laterality: N/A;       Family History   Problem Relation Age of Onset    Heart disease Mother     Diabetes Mother     Obesity Mother     Diabetes Maternal Aunt     Diabetes Maternal Uncle     Diabetes Paternal Aunt     Diabetes Paternal Uncle     Cancer Maternal Grandmother     Cancer Maternal Grandfather     Cancer Paternal Grandmother     Cancer Paternal Grandfather     Obesity Sister     Obesity Sister        Social History     Socioeconomic History    Marital status:    Tobacco Use    Smoking status: Every Day     Packs/day: 0.50     Types: Cigarettes    Smokeless tobacco: Never    Tobacco comments:     Reports quit smoking for 9 years but recent restarted and not ready to quit at this time.    Substance and Sexual Activity    Alcohol use: Yes     Comment: Socially    Drug use: Never    Sexual activity: Yes     Partners: Male   Social History Narrative        Sales        MEDICATIONS & ALLERGIES:     Current Outpatient Medications on File Prior to Visit   Medication Sig Dispense Refill    azelastine (ASTELIN) 137 mcg (0.1 %) nasal spray SMARTSIG:Both Nares      benzonatate (TESSALON) 100 MG capsule Take 100 mg by mouth 3 (three) times daily.      fluticasone propionate (FLONASE) 50 mcg/actuation nasal spray by Each Nostril route.      topiramate (TOPAMAX) 25 MG tablet Take 1 tablet (25 mg total) by mouth 2 (two) times daily. 60 tablet 0     No current facility-administered medications on file prior to visit.        Review of patient's allergies indicates:   Allergen Reactions    Benadryl [diphenhydramine hcl] Hives    Chocolate flavor Nausea And Vomiting     HIVES    Codeine Nausea And Vomiting     HIVES       OBJECTIVE:     Vital Signs:  Vitals:    09/12/22 0800   BP: (!) 150/70   Pulse: 74   Resp: 16   Temp: 98.3 °F (36.8 °C)     Body mass index is 34.58 kg/m².      Physical Exam:  Constitutional: She is oriented to person, place, and time. She appears well-developed and well-nourished. No distress.   HENT:   Head: Normocephalic and atraumatic.   Mouth/Throat: No oropharyngeal exudate.   Eyes: Pupils are equal, round, and reactive to light. Conjunctivae and EOM are normal. No scleral icterus.   Neck: Normal range of motion. Neck supple. No JVD present. No tracheal deviation present. No thyromegaly present.   Cardiovascular: Normal rate, regular rhythm and normal heart sounds. Exam reveals no gallop and no friction rub.   No murmur heard.  Pulmonary/Chest: Effort normal and breath sounds normal. No stridor. No respiratory distress. She has no wheezes. She has no rales. She exhibits no tenderness.   Abdominal: Soft. Bowel sounds are normal. She exhibits no distension and no mass. There is no tenderness. There is no rebound and no guarding.   Well healed laproscopic incisions x 3.  Musculoskeletal: Normal range of motion. She exhibits no edema or tenderness.   Lymphadenopathy:     She has no cervical adenopathy.   Neurological: She is alert and oriented to person, place, and time. No cranial nerve deficit.   Skin: Skin is warm and dry. No rash noted. She is not diaphoretic. No erythema.   Psychiatric: She has a normal mood and affect. Her behavior is normal.       Laboratory  Lab Results   Component Value Date    WBC 5.00 08/17/2022    HGB 14.1 08/17/2022    HCT 41.6 08/17/2022    MCV 98 08/17/2022     08/17/2022     No results found for: INR, PROTIME  No results found for: HGBA1C  No results for input(s): POCTGLUCOSE in the last 72 hours.    Diagnostic Results:  Reviewed    TRANSITION OF CARE:     [facility] Contact Note: 8/23/22    Family and/or Caretaker present at visit?  No.  Diagnostic tests reviewed/disposition: No diagnosic tests pending after this hospitalization.  Disease/illness education: Gastric lap band removal, smoking cessation  Home  health/community services discussion/referrals: Patient does not have home health established from hospital visit.  They do not need home health.  If needed, we will set up home health for the patient.   Establishment or re-establishment of referral orders for community resources: No other necessary community resources.   Discussion with other health care providers: No discussion with other health care providers necessary.     Transition of Care Visit:  I have reviewed and updated the history and problem list.  I have reconciled the medication list.  I have discussed the hospitalization and current medical issues, prognosis and plans with the patient/family.  I  spent more than 50% of time discussing the care with the patient/family.  Total Face-to-Face Encounter: 70 minutes.    Medications Reconciliation:   I have reconciled the patient's home medications and discharge medications with the patient/family. I have updated all changes.  Refer to After-Visit Medication List.    8 minutes of visit spend discussing effects of tobacco on body systems, risks of not quitting smoking, benefits of quitting smoking and options available such as patches, gum, counseling. Patient not ready to quit at this time.      ASSESSMENT & PLAN:         1. Tobacco dependence due to cigarettes [F17.210 (ICD-10-CM)]  Assessment & Plan:  Dangers of cigarette smoking were reviewed with patient in detail for 8 minutes and patient was encouraged to quit. Nicotine replacement options were discussed. Patient does not want to quit at this time.              2. History of removal of laparoscopic gastric banding device  Assessment & Plan:  8/19/22 Gastric lap band removal due to reflux and dysphagia.   Followed by Surgeon.     Orders:  -     Ambulatory referral/consult to [facility]    3. Obesity (BMI 30.0-34.9)  Assessment & Plan:  Chronic.  Recent gastric lap band removal due to reflux and dysphagia.  Watch caloric intake, increase  activity.         Were controlled substances prescribed?  No      Scheduled Follow-up :  Future Appointments   Date Time Provider Department Center   10/13/2022  3:15 PM Paresh Sampson MD [facility] [facility]       After Visit Medication List :     Medication List            Accurate as of September 12, 2022  3:24 PM. If you have any questions, ask your nurse or doctor.                CONTINUE taking these medications      azelastine 137 mcg (0.1 %) nasal spray  Commonly known as: ASTELIN     benzonatate 100 MG capsule  Commonly known as: TESSALON     fluticasone propionate 50 mcg/actuation nasal spray  Commonly known as: FLONASE     topiramate 25 MG tablet  Commonly known as: TOPAMAX  Take 1 tablet (25 mg total) by mouth 2 (two) times daily.              Signature:  Alanis Corona NP

## 2022-09-12 NOTE — ASSESSMENT & PLAN NOTE
Chronic.  Recent gastric lap band removal due to reflux and dysphagia.  Watch caloric intake, increase activity.

## 2022-09-23 ENCOUNTER — TELEPHONE (OUTPATIENT)
Dept: BARIATRICS | Facility: CLINIC | Age: 47
End: 2022-09-23
Payer: COMMERCIAL

## 2022-09-23 NOTE — TELEPHONE ENCOUNTER
Returned call to pt with advisement from Ronna Pride NP to stop Topamax. No answer, LMOM to call us back.       ----- Message from Ronna Pride NP sent at 9/23/2022  8:06 AM CDT -----  Contact: pt [phone]  Tell her to stop taking  ----- Message -----  From: Renu Hwang RN  Sent: 9/22/2022   1:46 PM CDT  To: Ronna Pride NP    Should I advise her to stop taking?  ----- Message -----  From: Janine Agarwal  Sent: 9/22/2022  12:26 PM CDT  To: [facility] Staff    Stated that the following Rx is causing nausea and lightheadedness.    topiramate (TOPAMAX) 25 MG tablet    Please call and advise.    Thank You

## 2022-09-26 ENCOUNTER — TELEPHONE (OUTPATIENT)
Dept: BARIATRICS | Facility: CLINIC | Age: 47
End: 2022-09-26
Payer: COMMERCIAL

## 2022-09-26 NOTE — TELEPHONE ENCOUNTER
Returned pt's call. Instructed her to stop Topamax due to nausea and lightheadedness, per Ronna Pride NP. Pt also rescheduled her upcoming appt due to conflict with work schedule. Pt agreed to new date and time.       ----- Message from Kely Richardson sent at 9/23/2022 10:52 AM CDT -----  Contact: Patient  Type: Patient Call Back         Who called: Patient         What is the request in detail: returning missed call; regarding Topamax Rx;  please advise         Best call back number: [phone]         Additional Information:   "[facility] [email or web address] #[id number] - [place] - [address] AT [facility] [address]  [facility] [place] [address]  Phone: [phone] Fax: [phone]             Thank You

## 2022-10-18 ENCOUNTER — OFFICE VISIT (OUTPATIENT)
Dept: BARIATRICS | Facility: CLINIC | Age: 47
End: 2022-10-18
Payer: COMMERCIAL

## 2022-10-18 VITALS
TEMPERATURE: 98 F | DIASTOLIC BLOOD PRESSURE: 88 MMHG | HEART RATE: 65 BPM | BODY MASS INDEX: 35.1 KG/M2 | RESPIRATION RATE: 16 BRPM | SYSTOLIC BLOOD PRESSURE: 154 MMHG | WEIGHT: 218.38 LBS | HEIGHT: 66 IN

## 2022-10-18 DIAGNOSIS — E66.01 MORBID OBESITY: ICD-10-CM

## 2022-10-18 DIAGNOSIS — G47.33 OSA (OBSTRUCTIVE SLEEP APNEA): ICD-10-CM

## 2022-10-18 DIAGNOSIS — G47.33 OBSTRUCTIVE SLEEP APNEA OF ADULT: Primary | ICD-10-CM

## 2022-10-18 PROCEDURE — 3077F PR MOST RECENT SYSTOLIC BLOOD PRESSURE >= 140 MM HG: ICD-10-PCS | Mod: CPTII,S$GLB,, | Performed by: SURGERY

## 2022-10-18 PROCEDURE — 99213 OFFICE O/P EST LOW 20 MIN: CPT | Mod: 24,S$GLB,, | Performed by: SURGERY

## 2022-10-18 PROCEDURE — 1159F PR MEDICATION LIST DOCUMENTED IN MEDICAL RECORD: ICD-10-PCS | Mod: CPTII,S$GLB,, | Performed by: SURGERY

## 2022-10-18 PROCEDURE — 3077F SYST BP >= 140 MM HG: CPT | Mod: CPTII,S$GLB,, | Performed by: SURGERY

## 2022-10-18 PROCEDURE — 99999 PR PBB SHADOW E&M-EST. PATIENT-LVL III: ICD-10-PCS | Mod: PBBFAC,,, | Performed by: SURGERY

## 2022-10-18 PROCEDURE — 99999 PR PBB SHADOW E&M-EST. PATIENT-LVL III: CPT | Mod: PBBFAC,,, | Performed by: SURGERY

## 2022-10-18 PROCEDURE — 1159F MED LIST DOCD IN RCRD: CPT | Mod: CPTII,S$GLB,, | Performed by: SURGERY

## 2022-10-18 PROCEDURE — 3079F DIAST BP 80-89 MM HG: CPT | Mod: CPTII,S$GLB,, | Performed by: SURGERY

## 2022-10-18 PROCEDURE — 3079F PR MOST RECENT DIASTOLIC BLOOD PRESSURE 80-89 MM HG: ICD-10-PCS | Mod: CPTII,S$GLB,, | Performed by: SURGERY

## 2022-10-18 PROCEDURE — 99213 PR OFFICE/OUTPT VISIT, EST, LEVL III, 20-29 MIN: ICD-10-PCS | Mod: 24,S$GLB,, | Performed by: SURGERY

## 2022-10-18 NOTE — PROGRESS NOTES
Topamax made her nauseated and made her pass out once- she stopped  Travelling very much    Vitals:    10/18/22 0854   BP: (!) 154/88   Pulse: 65   Resp: 16   Temp: 98.2 °F (36.8 °C)     Abdominal pain    A/P    She is eating healthy although she is going out to eat.  I do suspect some of this may be related to portion sizes.  She does relate that she has been having some alcoholic beverages which may also be contributing to the weight going up.  She is been doing some exercising.  This is mainly walking.    She currently qualifies weight wise for bariatric surgery and is interested in pursuing this.  She is also worried that she has sleep apnea, feels like she stops breath, loud snoring.  Her snoring is significant enough that her partner sleeps in a different room.  She does feel like she is choking at night.  She does feel daytime fatigue.    We have been working on dieting for a few months and will continue this.  She does not use Topamax as it caused some severe side effects.  We should not use phentermine as she is currently smoking and is planning to stop.  We can consider when she stops.    Cannot take topamax for side effects  Avoiding phentermine for nicotine currently  Considering sleeve if sleep study is positive

## 2022-10-19 ENCOUNTER — TELEPHONE (OUTPATIENT)
Dept: SURGERY | Facility: HOSPITAL | Age: 47
End: 2022-10-19
Payer: COMMERCIAL

## 2022-10-19 DIAGNOSIS — G47.33 OBSTRUCTIVE SLEEP APNEA OF ADULT: Primary | ICD-10-CM

## 2022-10-20 ENCOUNTER — PATIENT MESSAGE (OUTPATIENT)
Dept: PSYCHIATRY | Facility: CLINIC | Age: 47
End: 2022-10-20
Payer: COMMERCIAL

## 2022-11-03 ENCOUNTER — PROCEDURE VISIT (OUTPATIENT)
Dept: SLEEP MEDICINE | Facility: HOSPITAL | Age: 47
End: 2022-11-03
Attending: SURGERY
Payer: COMMERCIAL

## 2022-11-03 DIAGNOSIS — G47.33 OBSTRUCTIVE SLEEP APNEA OF ADULT: ICD-10-CM

## 2022-11-03 PROCEDURE — 95806 SLEEP STUDY UNATT&RESP EFFT: CPT

## 2022-11-04 ENCOUNTER — PATIENT MESSAGE (OUTPATIENT)
Dept: PSYCHIATRY | Facility: CLINIC | Age: 47
End: 2022-11-04
Payer: COMMERCIAL

## 2022-11-04 NOTE — PATIENT INSTRUCTIONS
[facility]  [address] [place] [address], [address]  [place] [address]    Phone: [phone]        Fax: [phone]    Please call the sleep center: [phone]    Repeat   SLEEP STUDY  [] (Schedule per Doctors order)    C-PAP TITRATION  [] (Schedule 1-2 weeks after Sleep Study)    PAP-NAP   [] (Schedule per Doctors order)    CPAP SETUP - Will be notified 5 - 10 business days upon completion of study    **FOLLOW-UP VISIT AFTER CPAP TITRATION**    SCHEDULE YOUR FOLLOW-UP VISIT WITH DR. GARCIA 6 WEEKS     **AFTER USING YOUR EQUIPMENT**    IF YOU ARE AN [facility] CLINIC PATIENT PLEASE CALL: [phone]  IF YOU ARE AN DR. GARCIA PATIENT AT [facility] PLEASE CALL: [phone]    ADDITIONAL INSTRUCTIONS FOR SCHEDULING YOUR APPOINTMENT:  Please bring SD chip or CPAP machine    *Prescriptons for CPAP and medications will be delayed in the event that Dr. Garcia is out of the office     RECORD DATE AND TIME OF SCHEDULED APPOINTMENT    ___________________________________________________    You will receive a written confirmation of your scheduled appointment  with instructions by mail in five to seven days.

## 2022-11-08 ENCOUNTER — CLINICAL SUPPORT (OUTPATIENT)
Dept: BARIATRICS | Facility: CLINIC | Age: 47
End: 2022-11-08

## 2022-11-08 DIAGNOSIS — Z71.3 DIETARY COUNSELING AND SURVEILLANCE: Primary | ICD-10-CM

## 2022-11-08 DIAGNOSIS — E66.01 MORBID OBESITY: ICD-10-CM

## 2022-11-08 PROCEDURE — 99999 PR PBB SHADOW E&M-EST. PATIENT-LVL III: CPT | Mod: PBBFAC,,, | Performed by: DIETITIAN, REGISTERED

## 2022-11-08 PROCEDURE — 99999 PR PBB SHADOW E&M-EST. PATIENT-LVL III: ICD-10-PCS | Mod: PBBFAC,,, | Performed by: DIETITIAN, REGISTERED

## 2022-11-08 PROCEDURE — 97802 PR MED NUTR THER, 1ST, INDIV, EA 15 MIN: ICD-10-PCS | Mod: S$GLB,,, | Performed by: DIETITIAN, REGISTERED

## 2022-11-08 PROCEDURE — 97802 MEDICAL NUTRITION INDIV IN: CPT | Mod: S$GLB,,, | Performed by: DIETITIAN, REGISTERED

## 2022-11-09 VITALS — WEIGHT: 221.44 LBS | HEIGHT: 66 IN | BODY MASS INDEX: 35.59 KG/M2

## 2022-11-10 NOTE — PROGRESS NOTES
NUTRITIONAL CONSULT    Referring Physician: Dr. Sampson  Reason for MNT Referral: Initial assessment for sleeve gastrectomy work-up    PAST MEDICAL HISTORY:   47 y.o. female presents with a BMI of Body mass index is 36.29 kg/m²..    CLINICAL DATA:  47 y.o.-year-old White female.  Height: 5'5.5  Weight: 221 lbs  IBW: 127 lbs  BMI: 36.29  The patient's goal weight (50 % EBW): 174 lbs      Goal for Bariatric Surgery: to lose weight    NUTRITION & HEALTH HISTORY:  Greatest challenge: dining out frequency, grazes instead of meals    Current diet recall: Breakfast: coffee only, tuna packet, pepperoni + mozzarella  Lunch: same choices as breakfast or mini Kind bar.  Patient reports she mainly snacks on these foods compared to eating a full meal.  Dinner: fish or shrimp + vegetable  No snacking after dinner    Current Diet:  Meal pattern: irregular.  Protein supplements: 0-1. Atkins iced mocha (15 g protein)  Snacking: snacks instead of lunch. No other snacking.  Vegetables: Likes a variety. Eats almost daily.  Fruits: Likes a variety. Eats 2-3 times per week.  Beverages: sugar-free beverages, diet tea, and coffee without sugar  Dining out: Daily. Mostly fast food and restaurants.  Cooking at home: Daily. Mostly baked and grilled meat, fish, and vegetables.    Exercise:  Past exercise: None    Current exercise: None  Restrictions to exercise: none    Vitamins / Minerals / Herbs: none    Food Allergies:   Chocolate or chocolate flavoring    Social:  Works regular daytime shifts. Travels for work and long hours.  Lives with .  Grocery shopping and food prep self.  Patient believes the household will be supportive after surgery.  Alcohol: Socially.  Smoking: None.    ASSESSMENT:  Patient reports attempts at weight loss, only to regain lost weight.  Patient demonstrated knowledge of healthy eating behaviors and exercise patterns; admits to not eating healthy and not exercising at this point.  Patient states willingness  to change lifestyle and make behavior modifications.  Expect fair  compliance after surgery at this time.    Insurance requires medically supervised diet prior to consideration for bariatric surgery.    BARIATRIC DIET DISCUSSION:  Discussed diet after surgery and related to patients food record.  Reviewed diet progression before and after surgery.  Stressed importance of exercise and its role in achieving weight loss goals.  Answered all questions.    RECOMMENDATIONS:  Patient is a potential candidate for bariatric surgery.    Needs additional visit(s) with RD.    PLAN:  Resume work-up for surgery.  Continue to review Bariatric Nutrition Guidebook at home and call with any questions.  Work on Bariatric Nutrition Checklist.  Start including protein supplements in the diet plan daily.  Reduce frequency of dining out.  More grocery shopping and meal preparation at home.  Start MVI with iron.    SESSION TIME:  60 minutes

## 2022-11-11 ENCOUNTER — OFFICE VISIT (OUTPATIENT)
Dept: PSYCHIATRY | Facility: CLINIC | Age: 47
End: 2022-11-11
Payer: COMMERCIAL

## 2022-11-11 DIAGNOSIS — Z01.818 PREOPERATIVE EVALUATION TO RULE OUT SURGICAL CONTRAINDICATION: Primary | ICD-10-CM

## 2022-11-11 DIAGNOSIS — E66.01 MORBID OBESITY: ICD-10-CM

## 2022-11-11 PROCEDURE — 99999 PR PBB SHADOW E&M-EST. PATIENT-LVL II: CPT | Mod: PBBFAC,,, | Performed by: PSYCHOLOGIST

## 2022-11-11 PROCEDURE — 96138 PR PSYCH/NEUROPSYCH TEST ADMIN/SCORING, BY TECH, 2+ TESTS, 1ST 30 MIN: ICD-10-PCS | Mod: 95,,, | Performed by: PSYCHOLOGIST

## 2022-11-11 PROCEDURE — 96131 PR PSYCHOLOGIC TEST EVAL SVCS, EA ADDTL HR: ICD-10-PCS | Mod: 95,,, | Performed by: PSYCHOLOGIST

## 2022-11-11 PROCEDURE — 96138 PSYCL/NRPSYC TECH 1ST: CPT | Mod: 95,,, | Performed by: PSYCHOLOGIST

## 2022-11-11 PROCEDURE — 99999 PR PBB SHADOW E&M-EST. PATIENT-LVL II: ICD-10-PCS | Mod: PBBFAC,,, | Performed by: PSYCHOLOGIST

## 2022-11-11 PROCEDURE — 1159F PR MEDICATION LIST DOCUMENTED IN MEDICAL RECORD: ICD-10-PCS | Mod: CPTII,95,, | Performed by: PSYCHOLOGIST

## 2022-11-11 PROCEDURE — 96139 PSYCL/NRPSYC TST TECH EA: CPT | Mod: 95,,, | Performed by: PSYCHOLOGIST

## 2022-11-11 PROCEDURE — 96130 PR PSYCHOLOGIC TEST EVAL SVCS, 1ST HR: ICD-10-PCS | Mod: 95,,, | Performed by: PSYCHOLOGIST

## 2022-11-11 PROCEDURE — 90791 PR PSYCHIATRIC DIAGNOSTIC EVALUATION: ICD-10-PCS | Mod: 95,,, | Performed by: PSYCHOLOGIST

## 2022-11-11 PROCEDURE — 96130 PSYCL TST EVAL PHYS/QHP 1ST: CPT | Mod: 95,,, | Performed by: PSYCHOLOGIST

## 2022-11-11 PROCEDURE — 96131 PSYCL TST EVAL PHYS/QHP EA: CPT | Mod: 95,,, | Performed by: PSYCHOLOGIST

## 2022-11-11 PROCEDURE — 1159F MED LIST DOCD IN RCRD: CPT | Mod: CPTII,95,, | Performed by: PSYCHOLOGIST

## 2022-11-11 PROCEDURE — 96139 PR PSYCH/NEUROPSYCH TEST ADMIN/SCORING, BY TECH, 2+ TESTS, EA ADDTL 30 MIN: ICD-10-PCS | Mod: 95,,, | Performed by: PSYCHOLOGIST

## 2022-11-11 PROCEDURE — 90791 PSYCH DIAGNOSTIC EVALUATION: CPT | Mod: 95,,, | Performed by: PSYCHOLOGIST

## 2022-11-11 NOTE — Clinical Note
Preoperative evaluation completed. No overt psychological contraindications were revealed by psychological testing.

## 2022-11-12 NOTE — PROGRESS NOTES
Psychiatry Initial Visit (PhD)   Diagnostic Interview - CPT 46720     Date: 11/12/2022    [address]  [place]    Site: [facility]    Referral source: Paresh Sampson M.D.    Clinical status of patient: Outpatient     Ms. White, a 47 y.o. female, presented for initial evaluation visit. Before this evaluation was initiated, the purposes and process of the assessment and the limits of confidentiality were discussed with the patient who expressed understanding of these issues and orally consented to proceed with the evaluation.     Chief complaint/reason for encounter: Routine psychological evaluation prior to bariatric surgery.     Type of surgery sought: Gastric sleeve    History of present illness: Ms. White is a 47-year-old white female who is pursuing bariatric surgery to improve her health and quality of life. She briefly struggled with panic attacks about 3 years ago and was prescribed Xanax.  She has no other history of significant psychological difficulties. She has never been hospitalized for psychiatric reasons, and denied any history of suicidality.  She has begun making positive lifestyle changes in anticipation for surgery, with good benefit. The patient has a Body Mass Index of 36.29 as documented by the referring provider.    Ms. White has struggled with weight since her teenage years (around age 14).  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 Pt stated that she currently travels all day for work, which interferes with regular and healthy eating. She also reported limited time for cooking at present.  Ms. White denied being an emotional eater. She is working on increasing her coping mechanisms for stress, including reading, being outside, playing with dogs, and cooking healthy food.  She has tried many weight loss methods on her own (i.e. keto diet, Atkins, exercise programs, fasting)  with little success, and she believes that her biggest weight loss challenge is her work schedule's interference with healthy eating habits.      Pt noted that the gastric band was helpful for weight loss. Pt stated she had a lap band placed about 10 years ago (age 38). Pt stated that her bariatric doctor retired from his practice and established care with Dr. Sampson. Pt noted that Dr. Sampson stated that the band should have been removed after 6 years. Pt lost 95 pounds with band operation, and pt regained about 35 pounds. Pt noted that she was vomiting due to the band not operating properly. Pt stated the band was removed by Camilla about 3 months ago. Pt noted that she initially quit smoking for gastric band, relapsed about one year ago, and discontinued again. Pt stated she did not smoke at all for about 10 years and reported motivation to continue abstaining at present.    Her motivation for seeking surgery now is improving her health and reducing her hip pain. She stated all women on her father's side of family have had hip replacements.     Ms. White has met with Ms. Sisi RD, bariatric dietician, and reports that she has made the following lifestyle changes since beginning the bariatric program: switched to decaffeinated tea, resumed daily vitamins, increasing protein intake, and cooking low carb meals.  She must continue meeting with Ms. Laird to demonstrate the implementation of lifestyle changes prior to clearance for bariatric surgery.    Co-morbidities: Hypertension    Past Medical History:   Diagnosis Date    H/O gastric bypass 2014     Knowledge of surgery information:  - Basics of procedure: Y  - Risks: Y  - Basics of diet: Y    Pain: 5/10 (hip pain, worse in the morning)    Psychiatric Symptoms:   Depression - denied significant symptoms of depression.   Cookie/Hypomania - denied significant symptoms of cookie/hypomania.  Anxiety - She reported a little bout of anxiety 2-3 years ago, and she  thought she was having a heart attack because mother had heart attack in her 30s. Pt described a panic attack. Pt reported paresthesia, rapid heart rate, and shortness of breath at the time. Pt stated she had a couple panic attacks since then. Pt's last panic attack occurred about 2 years ago. She was prescribed Xanax at the time, and she no longer takes the medication.  Thoughts - denied delusions, hallucinations.  Suicidal thoughts/behaviors - denied.  Substance abuse - denied abuse or dependence.   Sleep - Pt's sleep quality fluctuates.  Self-injury - denied.    Current psychiatric treatment:  Medications: Pt denied.    Psychotherapy: Pt denied.    Treating clinicians: n/a    Health behaviors: Reported adherence to pharmacologic regimen.      Psychiatric history:   Previous diagnosis: Panic disorder    Previous hospitalizations: Pt denied.     History of outpatient treatment: Pt denied.    Previous suicide attempt: Pt denied.      Trauma history:  Pt denied.      History of eating disorders:  History of bulimia: Pt denied.    History of binge-eating episodes: Pt denied.      Family history of psychiatric illness: None reported.     Social history (marriage, employment, etc.): Pt was born and raised in [place], [place] by  parents. Pt was oldest child and helped raised two younger sisters and younger brother. Pt's father's side of the family owns [employer]. Pt described her childhood as average. Pt denied difficulty in school and graduated with her high school diploma. Pt also attended cosmetic school, culinary school, and stenography school, stating that she got bored easily with school. Pt now works in  for [employer].  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 She reported finances are stable. Pt has been  for 22 years and has no children. Pt stated she is really close with her siblings and parents.    Current psychosocial stressors: Pain from excess weight;  Traveling for work causes stress. Pt denied major stressors.    Report of coping skills: Going out with friends and ; playing darts and pool    Support system: Sisters, , two female friends from middle school    Substance use:   Alcohol: Pt reported she drinks about 3 alcoholic beverages on Fridays and Saturdays. 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 Pt denied problematic alcohol use since this time. Encouraged pt to reduce alcohol use in preparation for surgery and necessary lifestyle change. Pt was amenable to this recommendation.  Drugs: Pt discontinued marijuana use about 4 years ago. Denied history of abuse or dependency.  Tobacco: Quit smoking for 10 years and resumed last year. Discontinued again about 2 months ago. Pt is motivated to continue abstaining.  Caffeine: Coffee every morning and tea. Working on cutting back in anticipation for surgery.    Current medications and drug reactions (include OTC, herbal): see medication list     Strengths and liabilities: Strength: Patient accepts guidance/feedback, Strength: Patient is expressive/articulate., Strength: Patient is intelligent., Strength: Patient is motivated for change., Strength: Patient has positive support network., Strength: Patient has reasonable judgment., Strength: Patient is stable.     Current Evaluation:    Mental Status Exam:   General Appearance:  age appropriate, well dressed, neatly groomed, overweight    Speech:  normal tone, normal rate, normal pitch, normal volume    Level of Cooperation:  cooperative    Thought Processes:  normal and logical    Mood:  euthymic    Thought Content:  normal, no suicidality, no homicidality, delusions, or paranoia    Affect:  congruent and appropriate    Orientation:  oriented x3    Memory:  recent memory intact; immediate and delayed word recall 3/3  remote memory intact; able to recall remote personal events   Attention Span &  "Concentration:  spelled "WORLD" forwards and backwards without errors   Fund of General Knowledge:  appropriate for education    Abstract Reasoning:  interpretation of proverbs was abstract; interpretation of similarities was abstract   Judgment & Insight:  good    Language  intact        Diagnostic Impression - Plan:      Diagnostic Impression:  Z01.818 Pre-operative examination   E66.01   Morbid obesity  IZ68.xx    Body Mass Index of 36.29    Summary/Conclusion:   There are no overt psychological contraindications for proceeding with bariatric surgery.  The patient experienced a brief period of panic attacks about 3 years ago and denied other psychiatric history. She reports no current psychiatric problems or major adjustment issues.  Encouraged pt to reduce alcohol use in preparation for surgery, and she was amenable to this recommendation. The patient has reasonable expectations for surgery, good social support, and has already begun making appropriate lifestyle changes in anticipation for surgery. The patient has verbalized appropriate awareness and commitment to the necessary behavioral changes associated with bariatric surgery and appears willing to comply with long-term lifestyle changes. There are no recommendations for psychological treatment at this time. The patient is aware of resources available should her/his needs change in the future.    Recommendations:  -This patient is psychologically cleared to proceed with bariatric surgery.    Please see Psychological Testing report available in Notes tab of Chart Review in Epic for results of psychological testing.      "

## 2022-11-15 NOTE — PSYCH TESTING
[facility]  [address] [place] [address]  [phone]    REPORT OF PSYCHOLOGICAL TESTING    NAME: Nano White  MRN: [id number]  : 1975     REFERRED BY: Paresh Sampson MD    REASON FOR REFERRAL:  Psychological Evaluation prior to bariatric surgery.    EVALUATED BY:  Malvin Rose, Ph.D., Clinical Psychologist  RADHA Waggoner, Psychometrician    DATES OF EVALUATION: 2022 and 2022    EVALUATION PROCEDURES AND TIMES:  Conducted by Psychologist (2 hours):  Integration of patient data, interpretation of standardized test results and clinical data, clinical decision-making, treatment planning and report, and interactive feedback to the patient  CPT Codes:  57442 - 1 hour; 00910 - 1 hour    Conducted by Technician (1.5 hours):  Psychological test administration and scoring by technician, two or more tests, any method:  [place] Multiphasic Personality Inventory - 2 - Restructured Form (MMPI-2-RF); [facility] Behavioral Medicine Diagnostic (MBMD)  CPT Codes:  02927 - 30 minutes; 84591 - 30 minutes, 43693 - 30 minutes, 39008 - 30 minutes     EVALUATION FINDINGS:  Before this evaluation was initiated, the purposes and process of the assessment and the limits of confidentiality were discussed with the patient who expressed understanding of these issues and orally consented to proceed with the evaluation.    Ms. White has struggled with weight since about age 14. Factors that have contributed to her weight gain over the years include: limited exercise and irregular eating schedule due to work schedule. She does not consider herself to be an emotional eater. She has tried many weight loss methods on her own with little success, and she believes that her biggest weight loss challenge is her work schedule in the past. Her motivation for seeking surgery now is to improve her health and reducing pain; specifically, she has recently been experiencing significant hip pain. Her  postsurgical goals include: improving her diet and losing weight that was recently gained.    Ms. White has no history of significant psychiatric symptoms. She has never been in mental health treatment and never been diagnosed with a psychiatric condition. She denies a history of eating disorder.    At her initial consultation with Dr. Sampson, her BMI was 36.29. She has met multiple times with a bariatric dietician, and reports that she has made changes to her eating habits. She was aware of details regarding the Sleeve procedure, as well as risks of the procedure and post-operative eating restrictions. She appears motivated for change at this time. She was fully cooperative and engaged in the assessment process.    MMPI-2 RF.  The MMPI-2 RF provides an assessment of personality and psychopathology with specific evaluation of psychosocial risk factors associated with outcomes of bariatric surgery. Ms. White produced a valid and interpretable MMPI-2RF protocol. Her test results should be considered a reasonably accurate reflection of her current psychiatric functioning. Her scores indicate no current psychiatric symptoms, personality dysfunction, or adjustment issues at this time.      MBMB. The MBMD is a multidimensional assessment designed to identify psychosocial factors that may support or interfere with a patient's course of medical treatment. The categorizations below are based on credible probabilistic judgments and should not be considered definitive.    Negative Health Habits.  The following negative health habits are likely problem areas: caffeine use and eating habits.    Psychiatric Indications.  Pt's profile indicates possible suspiciousness toward others, including healthcare professionals.    Coping Styles.  Pt exhibits positive coping assets of: sociability, confidence, and respect.    Stress Moderators.  Pt's profile indicates pain sensitivity as a liability to her functioning and social support and  spiritual alec as assets to her functioning.    POST-SURGERY PROBABILITIES    Patient Behavior.  Ms. Herrmann's profile indicates high likelihood that she will: change her unhealthy habits; refrain form engaging in unhealthy eating behavior; follow nutritional advice; comply with a medical regimen; and maintain her postsurgical weight loss.    Postsurgical [place].  Ms. White' profile indicates good likelihood that surgery will improve her overall quality of life. Her profile indicates average likelihood that surgery will improve her: psychosocial functioning, body image, physical health, mental outlook, sexual activity, and employment opportunities.     Psychological factors are unlikely to contribute to excessive complications for this patient.    DIAGNOSTIC IMPRESSIONS:  Z68.43   Body Mass Index of 36.29  Z01.818 Pre-operative Exam  E66.01   Morbid Obesity    SUMMARY: Ms. White has a long history of weight problems and is pursuing bariatric surgery at this time in an effort to improve her health and quality of life. Results of personality testing should be considered valid, and they indicate no acute psychiatric symptoms or declines in functioning at this time that would impact her surgical prognosis. Test results do not reveal any evidence that psychological difficulties would play a role in her recovery from surgery. No overt psychological contraindications were revealed by psychological testing.

## 2022-12-05 ENCOUNTER — OFFICE VISIT (OUTPATIENT)
Dept: BARIATRICS | Facility: CLINIC | Age: 47
End: 2022-12-05
Payer: COMMERCIAL

## 2022-12-05 VITALS
HEART RATE: 56 BPM | WEIGHT: 219.81 LBS | SYSTOLIC BLOOD PRESSURE: 144 MMHG | RESPIRATION RATE: 16 BRPM | BODY MASS INDEX: 35.32 KG/M2 | DIASTOLIC BLOOD PRESSURE: 76 MMHG | HEIGHT: 66 IN | TEMPERATURE: 98 F

## 2022-12-05 DIAGNOSIS — E66.01 MORBID OBESITY: Primary | ICD-10-CM

## 2022-12-05 DIAGNOSIS — G47.33 OSA (OBSTRUCTIVE SLEEP APNEA): ICD-10-CM

## 2022-12-05 DIAGNOSIS — Z98.84 HX OF LAPAROSCOPIC ADJUSTABLE GASTRIC BANDING: ICD-10-CM

## 2022-12-05 PROCEDURE — 99213 PR OFFICE/OUTPT VISIT, EST, LEVL III, 20-29 MIN: ICD-10-PCS | Mod: S$GLB,,, | Performed by: NURSE PRACTITIONER

## 2022-12-05 PROCEDURE — 1159F MED LIST DOCD IN RCRD: CPT | Mod: CPTII,S$GLB,, | Performed by: NURSE PRACTITIONER

## 2022-12-05 PROCEDURE — 3078F DIAST BP <80 MM HG: CPT | Mod: CPTII,S$GLB,, | Performed by: NURSE PRACTITIONER

## 2022-12-05 PROCEDURE — 1159F PR MEDICATION LIST DOCUMENTED IN MEDICAL RECORD: ICD-10-PCS | Mod: CPTII,S$GLB,, | Performed by: NURSE PRACTITIONER

## 2022-12-05 PROCEDURE — 3008F PR BODY MASS INDEX (BMI) DOCUMENTED: ICD-10-PCS | Mod: CPTII,S$GLB,, | Performed by: NURSE PRACTITIONER

## 2022-12-05 PROCEDURE — 1160F PR REVIEW ALL MEDS BY PRESCRIBER/CLIN PHARMACIST DOCUMENTED: ICD-10-PCS | Mod: CPTII,S$GLB,, | Performed by: NURSE PRACTITIONER

## 2022-12-05 PROCEDURE — 3078F PR MOST RECENT DIASTOLIC BLOOD PRESSURE < 80 MM HG: ICD-10-PCS | Mod: CPTII,S$GLB,, | Performed by: NURSE PRACTITIONER

## 2022-12-05 PROCEDURE — 99999 PR PBB SHADOW E&M-EST. PATIENT-LVL IV: ICD-10-PCS | Mod: PBBFAC,,, | Performed by: NURSE PRACTITIONER

## 2022-12-05 PROCEDURE — 99213 OFFICE O/P EST LOW 20 MIN: CPT | Mod: S$GLB,,, | Performed by: NURSE PRACTITIONER

## 2022-12-05 PROCEDURE — 3077F PR MOST RECENT SYSTOLIC BLOOD PRESSURE >= 140 MM HG: ICD-10-PCS | Mod: CPTII,S$GLB,, | Performed by: NURSE PRACTITIONER

## 2022-12-05 PROCEDURE — 3077F SYST BP >= 140 MM HG: CPT | Mod: CPTII,S$GLB,, | Performed by: NURSE PRACTITIONER

## 2022-12-05 PROCEDURE — 1160F RVW MEDS BY RX/DR IN RCRD: CPT | Mod: CPTII,S$GLB,, | Performed by: NURSE PRACTITIONER

## 2022-12-05 PROCEDURE — 99999 PR PBB SHADOW E&M-EST. PATIENT-LVL IV: CPT | Mod: PBBFAC,,, | Performed by: NURSE PRACTITIONER

## 2022-12-05 PROCEDURE — 3008F BODY MASS INDEX DOCD: CPT | Mod: CPTII,S$GLB,, | Performed by: NURSE PRACTITIONER

## 2022-12-05 RX ORDER — PHENTERMINE HYDROCHLORIDE 15 MG/1
15 CAPSULE ORAL EVERY MORNING
Qty: 30 CAPSULE | Refills: 0 | Status: SHIPPED | OUTPATIENT
Start: 2022-12-05 | End: 2023-02-02

## 2022-12-05 RX ORDER — CETIRIZINE HYDROCHLORIDE 10 MG/1
10 TABLET ORAL
COMMUNITY
Start: 2022-09-07 | End: 2023-02-07

## 2022-12-05 NOTE — PROGRESS NOTES
Medically Supervised Weight Loss Documentation    Chief Complaint   Patient presents with    Obesity    Follow-up       History of Present Illness:  Patient is a 47 y.o. female who is referred for evaluation of surgical treatment of morbid obesity. Patient has a Body mass index is 36.02 kg/m². kg/m².  She has known comorbidities of obstructive sleep apnea. Patient has attended the bariatric seminar and is most interested in gastric bypass surgery surgery evaluation of surgical treatment of morbid obesity.       Comorbidities:   Past Medical History:   Diagnosis Date    H/O gastric bypass      Past Surgical History:   Procedure Laterality Date    BREAST REDUCTION      BREAST SURGERY      KNEE SURGERY      LAPAROSCOPIC GASTRIC BANDING      LAPAROSCOPIC REMOVAL OF GASTRIC BAND N/A 2022    Procedure: REMOVAL, GASTRIC BAND, LAPAROSCOPIC;  Surgeon: Paresh Sampson MD;  Location: [facility];  Service: General;  Laterality: N/A;     Family History   Problem Relation Age of Onset    Heart disease Mother     Diabetes Mother     Obesity Mother     Diabetes Maternal Aunt     Diabetes Maternal Uncle     Diabetes Paternal Aunt     Diabetes Paternal Uncle     Cancer Maternal Grandmother     Cancer Maternal Grandfather     Cancer Paternal Grandmother     Cancer Paternal Grandfather     Obesity Sister     Obesity Sister      Social History     Tobacco Use    Smoking status: Former     Packs/day: 0.50     Types: Cigarettes     Quit date: 10/18/2022     Years since quittin.1    Smokeless tobacco: Never   Substance Use Topics    Alcohol use: Yes     Comment: Socially    Drug use: Never        Review of patient's allergies indicates:   Allergen Reactions    Benadryl [diphenhydramine hcl] Hives    Chocolate flavor Nausea And Vomiting     HIVES    Codeine Nausea And Vomiting     HIVES       Medications:  Current Outpatient Medications on File Prior to Visit   Medication Sig Dispense Refill    cetirizine (ZYRTEC) 10 MG  tablet Take 10 mg by mouth.      fluticasone propionate (FLONASE) 50 mcg/actuation nasal spray by Each Nostril route.      topiramate (TOPAMAX) 25 MG tablet Take 1 tablet (25 mg total) by mouth 2 (two) times daily. (Patient not taking: Reported on 12/5/2022) 60 tablet 0    [DISCONTINUED] azelastine (ASTELIN) 137 mcg (0.1 %) nasal spray SMARTSIG:Both Nares      [DISCONTINUED] benzonatate (TESSALON) 100 MG capsule Take 100 mg by mouth 3 (three) times daily.       No current facility-administered medications on file prior to visit.         Body mass index is 36.02 kg/m².     Beginning Weight: 218 lbs    Last Weight: 219 lbs    Current Weight: 219 lbs   Weight Change: +1 lbs      Body comp:  Fat Percent:  46.4 %  Fat Mass:  102 lb  FFM:  117.8 lb  TBW: 85.4 lb  TBW %:  38.9 %  BMR: 1663 kcal    Wt Readings from Last 5 Encounters:   12/05/22 99.7 kg (219 lb 12.8 oz)   11/08/22 100.4 kg (221 lb 7.2 oz)   10/18/22 99.1 kg (218 lb 6.4 oz)   09/12/22 95.7 kg (211 lb)   09/08/22 96.1 kg (211 lb 12.8 oz)         Chart review:  Primary Care Physician: Josef      Lab review:  Most Recent Data:  CBC:   Lab Results   Component Value Date    WBC 5.00 08/17/2022    HGB 14.1 08/17/2022    HCT 41.6 08/17/2022     08/17/2022    MCV 98 08/17/2022    RDW 13.9 08/17/2022     BMP:   Lab Results   Component Value Date     08/17/2022    K 3.9 08/17/2022     08/17/2022    CO2 25 08/17/2022    BUN 10 08/17/2022    CREATININE 0.7 08/17/2022    GLU 89 08/17/2022    CALCIUM 9.3 08/17/2022     LFTs:   Lab Results   Component Value Date    PROT 7.1 08/17/2022    ALBUMIN 3.5 08/17/2022    BILITOT 1.0 08/17/2022    AST 16 08/17/2022    ALKPHOS 50 (L) 08/17/2022    ALT 17 08/17/2022     Coags: No results found for: INR, PROTIME, PTT  FLP: No results found for: CHOL, HDL, LDLCALC, TRIG, CHOLHDL  DM:   Lab Results   Component Value Date    CREATININE 0.7 08/17/2022     Thyroid: No results found for: TSH, FREET4, A1WLVOH, O0ODSYZ,  "THYROIDAB  Anemia: No results found for: IRON, TIBC, FERRITIN, [id number], FOLATE  Cardiac: No results found for: TROPONINI, CKTOTAL, CKMB, BNP  Urinalysis: No results found for: LABURIN, COLORU, PHUA, CLARITYU, SPECGRAV, LABSPEC, NITRITE, PROTEINUR, GLUCOSEU, KETONESU, UROBILINOGEN, BILIRUBINUR, BLOODU, RBCU, WBCUA      Radiology review: Images independently reviewed and interpreted.      Other Results:  EKG (my interpretation): sinus bradycardia.        Review of Systems:  Review of Systems   All other systems reviewed and are negative.    Physical:     Vital Signs (Most Recent)  Temp: 98.4 °F (36.9 °C) (12/05/22 1614)  Pulse: (!) 56 (12/05/22 1614)  Resp: 16 (12/05/22 1614)  BP: (!) 144/76 (12/05/22 1614)  5' 5.5" (1.664 m)  99.7 kg (219 lb 12.8 oz)     Physical Exam:  Physical Exam  Vitals and nursing note reviewed.   Constitutional:       General: She is not in acute distress.     Appearance: Normal appearance. She is obese. She is not ill-appearing or toxic-appearing.   HENT:      Head: Normocephalic and atraumatic.      Right Ear: External ear normal.      Left Ear: External ear normal.      Nose: Nose normal. No congestion or rhinorrhea.      Mouth/Throat:      Mouth: Mucous membranes are moist.      Pharynx: Oropharynx is clear.   Eyes:      General:         Right eye: No discharge.         Left eye: No discharge.      Conjunctiva/sclera: Conjunctivae normal.   Cardiovascular:      Rate and Rhythm: Normal rate and regular rhythm.      Pulses: Normal pulses.      Heart sounds: Normal heart sounds. No murmur heard.  Pulmonary:      Effort: Pulmonary effort is normal. No respiratory distress.      Breath sounds: Normal breath sounds. No stridor. No wheezing.   Chest:      Chest wall: No tenderness.   Abdominal:      General: Bowel sounds are normal. There is no distension.      Palpations: There is no mass.      Tenderness: There is no abdominal tenderness. There is no guarding.      Hernia: No hernia is " present.   Musculoskeletal:         General: No swelling, tenderness, deformity or signs of injury. Normal range of motion.      Right lower leg: No edema.      Left lower leg: No edema.   Skin:     General: Skin is warm and dry.      Capillary Refill: Capillary refill takes less than 2 seconds.      Coloration: Skin is not jaundiced or pale.      Findings: No bruising, erythema or rash.   Neurological:      General: No focal deficit present.      Mental Status: She is oriented to person, place, and time.      Motor: No weakness.      Gait: Gait normal.   Psychiatric:         Mood and Affect: Mood normal.         Behavior: Behavior normal.         Thought Content: Thought content normal.         Judgment: Judgment normal.       ASSESSMENT/PLAN:        1. Morbid obesity        2. ADELIA (obstructive sleep apnea)            Continue with Dr. Sampson's established plan on initial evaluation        MSD 2/6     Patient will need:     Labs-   EKG- done  dietary consult- done  psych consult- done  Seminar- done  Sleep study- done     Will obtain the following clearances prior to surgery:   none      Diet Education Discussed:  Recommend high protein, low carb meals- mainly meats and vegetables.    Breakfast:  coffee with protein shake,   Lunch:  salad, grilled chicken or skip  Snack: nuts  Dinner:  protein and vegetables  Water: 1 gallon  Crystal light tea    MVI: baripal      Exercise/Activity Discussed: Recommend Cardiovascular exercise, get HR over 100 for 20 minutes 3 times per week-   Walking more  Joined gym ([employer])      Plan for this patient:  Diet adherence   No skipping meals  Tanita scale results reviewed with patient  Exercise/Activity adherence- increase activity  Fall reviewed with patient  Transition to OTC multivitamins- discussion of post-operative life long MVI need, including Calcium, and a B-Complex  preventative counseling- Lmp 11/8/22- not currently on birth control. Pregnancy: Pt aware that is it  not recommended to become pregnant for 18 month after bariatric surgery. She will be tested for pregnancy on the day of surgery. Contraceptives should be used. Contact OB/GYN for various methods and recommendations.    Discussion on surgery vs weight loss medication- will attempt weight loss without surgery at this time-- Discussed PO vs injectables, insurance not cover injectables without diabetes diagnosis and out of pocket cost is too costly   Does not like with Topamax   Will try low dose Phentermine-         I spent a total of 22 minutes on the day of the visit.This includes face to face time and non-face to face time preparing to see the patient (eg, review of tests), obtaining and/or reviewing separately obtained history, documenting clinical information in the electronic or other health record, independently interpreting results and communicating results to the patient/family/caregiver, or care coordinator.

## 2022-12-13 ENCOUNTER — TELEPHONE (OUTPATIENT)
Dept: BARIATRICS | Facility: CLINIC | Age: 47
End: 2022-12-13
Payer: COMMERCIAL

## 2022-12-13 NOTE — TELEPHONE ENCOUNTER
Returned call to pt. No answer, LMOM including confirmation of Rx being sent to pharmacy and PA pending.     ----- Message from Charmaine Gallo sent at 12/13/2022  2:45 PM CST -----  Contact: [phone]  Type: Needs Medical Advice  Who Called:  Pt     Best Call Back Number: [phone]    Additional Information: Pt is calling because her rx for phentermine 15 MG capsule was never sent to pharmacy after her appt on 12/05. Pls call back and advise

## 2023-01-04 ENCOUNTER — OFFICE VISIT (OUTPATIENT)
Dept: BARIATRICS | Facility: CLINIC | Age: 48
End: 2023-01-04
Payer: COMMERCIAL

## 2023-01-04 VITALS
WEIGHT: 220.38 LBS | TEMPERATURE: 98 F | SYSTOLIC BLOOD PRESSURE: 132 MMHG | HEIGHT: 66 IN | HEART RATE: 67 BPM | BODY MASS INDEX: 35.42 KG/M2 | DIASTOLIC BLOOD PRESSURE: 91 MMHG | RESPIRATION RATE: 16 BRPM

## 2023-01-04 DIAGNOSIS — E66.01 MORBID OBESITY: Primary | ICD-10-CM

## 2023-01-04 DIAGNOSIS — G47.33 OSA (OBSTRUCTIVE SLEEP APNEA): ICD-10-CM

## 2023-01-04 PROCEDURE — 99213 OFFICE O/P EST LOW 20 MIN: CPT | Mod: S$GLB,,, | Performed by: SURGERY

## 2023-01-04 PROCEDURE — 3080F DIAST BP >= 90 MM HG: CPT | Mod: CPTII,S$GLB,, | Performed by: SURGERY

## 2023-01-04 PROCEDURE — 3075F SYST BP GE 130 - 139MM HG: CPT | Mod: CPTII,S$GLB,, | Performed by: SURGERY

## 2023-01-04 PROCEDURE — 1159F PR MEDICATION LIST DOCUMENTED IN MEDICAL RECORD: ICD-10-PCS | Mod: CPTII,S$GLB,, | Performed by: SURGERY

## 2023-01-04 PROCEDURE — 99999 PR PBB SHADOW E&M-EST. PATIENT-LVL III: ICD-10-PCS | Mod: PBBFAC,,, | Performed by: SURGERY

## 2023-01-04 PROCEDURE — 1159F MED LIST DOCD IN RCRD: CPT | Mod: CPTII,S$GLB,, | Performed by: SURGERY

## 2023-01-04 PROCEDURE — 99999 PR PBB SHADOW E&M-EST. PATIENT-LVL III: CPT | Mod: PBBFAC,,, | Performed by: SURGERY

## 2023-01-04 PROCEDURE — 99213 PR OFFICE/OUTPT VISIT, EST, LEVL III, 20-29 MIN: ICD-10-PCS | Mod: S$GLB,,, | Performed by: SURGERY

## 2023-01-04 PROCEDURE — 3008F PR BODY MASS INDEX (BMI) DOCUMENTED: ICD-10-PCS | Mod: CPTII,S$GLB,, | Performed by: SURGERY

## 2023-01-04 PROCEDURE — 3080F PR MOST RECENT DIASTOLIC BLOOD PRESSURE >= 90 MM HG: ICD-10-PCS | Mod: CPTII,S$GLB,, | Performed by: SURGERY

## 2023-01-04 PROCEDURE — 3008F BODY MASS INDEX DOCD: CPT | Mod: CPTII,S$GLB,, | Performed by: SURGERY

## 2023-01-04 PROCEDURE — 3075F PR MOST RECENT SYSTOLIC BLOOD PRESS GE 130-139MM HG: ICD-10-PCS | Mod: CPTII,S$GLB,, | Performed by: SURGERY

## 2023-01-04 NOTE — PROGRESS NOTES
Medically Supervised Weight Loss Documentation    Date of Visit: 01/09/2023    Patient: Nano White    Current Weight: 220  Current BMI: Body mass index is 36.12 kg/m².  Weight Change:---    Last Weight: 219    Beginning Weight: 192      Vitals:   Vitals:    01/04/23 1637   BP: (!) 132/91   Pulse: 67   Resp: 16   Temp: 97.9 °F (36.6 °C)       Comorbidities:   Past Medical History:   Diagnosis Date    H/O gastric banding 2014       Medications:  Current Outpatient Medications on File Prior to Visit   Medication Sig Dispense Refill    cetirizine (ZYRTEC) 10 MG tablet Take 10 mg by mouth.      fluticasone propionate (FLONASE) 50 mcg/actuation nasal spray by Each Nostril route.       No current facility-administered medications on file prior to visit.         Body comp:  Fat Percent:  47.7 %  Fat Mass:  105.2 lb  FFM:  115.2 lb  TBW: 83.6 lb  TBW %:  37.9 %  BMR: 1637 kcal      Diet Education Discussed:    Breakfast:  cucumbers, sausage  Lunch:  skips sometimes  Snack: cashew almond  Dinner:  salad and protein    Exercise/Activity Discussed:    None really, starting this morning though    Behavior or Diet Goals for this patient:    started phentermine wed, feels like it is curbing appetitive- no jitteriness    Continue low carb dieting  At least 20 minutes 3 times per week of exercise    Psych- done  Diet- done  Ekg-        : total time spent for visit: 20 minutes

## 2023-02-01 ENCOUNTER — CLINICAL SUPPORT (OUTPATIENT)
Dept: BARIATRICS | Facility: CLINIC | Age: 48
End: 2023-02-01
Payer: COMMERCIAL

## 2023-02-01 VITALS — BODY MASS INDEX: 36.74 KG/M2 | WEIGHT: 228.63 LBS | HEIGHT: 66 IN

## 2023-02-01 DIAGNOSIS — Z71.3 ENCOUNTER FOR DIETARY COUNSELING AND SURVEILLANCE: ICD-10-CM

## 2023-02-01 DIAGNOSIS — E66.9 OBESITY (BMI 30-39.9): ICD-10-CM

## 2023-02-01 PROCEDURE — 99999 PR PBB SHADOW E&M-EST. PATIENT-LVL II: CPT | Mod: PBBFAC,,,

## 2023-02-01 PROCEDURE — 99999 PR PBB SHADOW E&M-EST. PATIENT-LVL II: ICD-10-PCS | Mod: PBBFAC,,,

## 2023-02-01 PROCEDURE — 97803 MED NUTRITION INDIV SUBSEQ: CPT | Mod: S$GLB,,,

## 2023-02-01 PROCEDURE — 97803 PR MED NUTR THER, SUBSQ, INDIV, EA 15 MIN: ICD-10-PCS | Mod: S$GLB,,,

## 2023-02-01 NOTE — PROGRESS NOTES
NUTRITION NOTE    Referring Physician: Dr. Sampson  Reason for MNT Referral: Medically Supervised Diet pending Gastric Sleeve.    PAST MEDICAL HISTORY:    Patient presents for 4/6 visit for MSD with weight gain over the past month; total weight loss by making numerous dietary and lifestyle changes.    Past Medical History:   Diagnosis Date    H/O gastric banding 2014       CLINICAL DATA:  47 y.o. female.    There were no vitals filed for this visit.    Current Weight: 228 lbs  Weight Change Since Initial Visit: +8 lbs  Ideal Body Weight: 127.5 lbs  BMI: 37.47    CURRENT DIET:  Regular diet.  Diet Recall: Food records are present.    Diet Includes: Breakfast- 8 oz. Coffee w/SF vanilla + P3 pack, tuna packet, salad, chicken maple sausage, or small bowel special K + 2% milk; Lunch - fast food or nuts, pickles, turkey sandwich with no bread; Dinner- Protein + veg  Meal Pattern: Same.  Protein Supplements: 0 per day.  Snacking: Adequate. Snacks include healthy choices.    Vegetables: Likes a variety. Eats almost daily.  Fruits: Likes a variety. Eats 2-3 times per week.  Beverages: sugar-free beverages, diet tea, and coffee   Dining out: Daily. Mostly fast food and restaurants.  Cooking at home: Daily. Mostly baked and grilled meat, fish, and vegetables.     Exercise:  Past exercise: None     Current exercise: spilt workout schedule of cardio and weights   Restrictions to exercise: Sciatica episodes x2 within last month      Vitamins / Minerals / Herbs: Woman's one-A-Day     Food Allergies:   Chocolate or chocolate flavoring     Social:  Works regular daytime shifts. Travels for work and long hours.  Lives with .  Grocery shopping and food prep self.  Patient believes the household will be supportive after surgery.  Alcohol: Socially.  Smoking: None.    ASSESSMENT:  Patient demonstrates some willingness to change lifestyle habits as evidenced by good exercise, dietary changes, increased fruits, increased  vegetables, better choices when dining out, more food preparation at [employer], healthier cooking at home, and healthier snacking at work.    Doing fairly well with working on greatest challenges (irregular meal pattern).    Excess calorie intake.  Adequate protein intake.    Difficulty with skipping lunch or eating fast food for lunch. Discussed importance of packing a lunch. Reviewed several lunch box options with patient including tuna packet+ cucumbers+ nuts, salad w/ protein, beef jerky + cheese stick+ apple slices, protein shakes, and leftovers. Reviewed menu items from typical fast food spots to ensure adequate choices when stopping.    PLAN:    Diet: Adjust diet plan.    Exercise: Maintain.    Behavior Modification: Continue to document food & activity logs daily.    Weight loss prior to surgery (5-10% TBW): +36lbs  Lap band: 2015  Dr. Frias  Starting 265 pounds  Lowest weight 180 pounds  Current weight 192 pounds  Goal weight: 160 pounds    Return to clinic in one month.    SESSION TIME:  45 minutes

## 2023-02-07 ENCOUNTER — OFFICE VISIT (OUTPATIENT)
Dept: FAMILY MEDICINE | Facility: CLINIC | Age: 48
End: 2023-02-07
Payer: COMMERCIAL

## 2023-02-07 VITALS
HEART RATE: 66 BPM | HEIGHT: 66 IN | WEIGHT: 223.13 LBS | BODY MASS INDEX: 35.86 KG/M2 | DIASTOLIC BLOOD PRESSURE: 80 MMHG | TEMPERATURE: 99 F | SYSTOLIC BLOOD PRESSURE: 122 MMHG | OXYGEN SATURATION: 98 %

## 2023-02-07 DIAGNOSIS — Z11.59 NEED FOR HEPATITIS C SCREENING TEST: ICD-10-CM

## 2023-02-07 DIAGNOSIS — Z00.00 ANNUAL PHYSICAL EXAM: Primary | ICD-10-CM

## 2023-02-07 DIAGNOSIS — Z11.4 ENCOUNTER FOR SCREENING FOR HIV: ICD-10-CM

## 2023-02-07 DIAGNOSIS — E66.9 OBESITY (BMI 35.0-39.9 WITHOUT COMORBIDITY): ICD-10-CM

## 2023-02-07 DIAGNOSIS — Z12.31 ENCOUNTER FOR SCREENING MAMMOGRAM FOR MALIGNANT NEOPLASM OF BREAST: ICD-10-CM

## 2023-02-07 DIAGNOSIS — Z12.11 COLON CANCER SCREENING: ICD-10-CM

## 2023-02-07 PROBLEM — Z98.84 HISTORY OF REMOVAL OF LAPAROSCOPIC GASTRIC BANDING DEVICE: Status: RESOLVED | Noted: 2022-09-12 | Resolved: 2023-02-07

## 2023-02-07 PROCEDURE — 1159F PR MEDICATION LIST DOCUMENTED IN MEDICAL RECORD: ICD-10-PCS | Mod: CPTII,S$GLB,, | Performed by: PHYSICIAN ASSISTANT

## 2023-02-07 PROCEDURE — 3074F PR MOST RECENT SYSTOLIC BLOOD PRESSURE < 130 MM HG: ICD-10-PCS | Mod: CPTII,S$GLB,, | Performed by: PHYSICIAN ASSISTANT

## 2023-02-07 PROCEDURE — 3008F PR BODY MASS INDEX (BMI) DOCUMENTED: ICD-10-PCS | Mod: CPTII,S$GLB,, | Performed by: PHYSICIAN ASSISTANT

## 2023-02-07 PROCEDURE — 99999 PR PBB SHADOW E&M-EST. PATIENT-LVL IV: CPT | Mod: PBBFAC,,, | Performed by: PHYSICIAN ASSISTANT

## 2023-02-07 PROCEDURE — 99386 PREV VISIT NEW AGE 40-64: CPT | Mod: S$GLB,,, | Performed by: PHYSICIAN ASSISTANT

## 2023-02-07 PROCEDURE — 3008F BODY MASS INDEX DOCD: CPT | Mod: CPTII,S$GLB,, | Performed by: PHYSICIAN ASSISTANT

## 2023-02-07 PROCEDURE — 99999 PR PBB SHADOW E&M-EST. PATIENT-LVL IV: ICD-10-PCS | Mod: PBBFAC,,, | Performed by: PHYSICIAN ASSISTANT

## 2023-02-07 PROCEDURE — 1159F MED LIST DOCD IN RCRD: CPT | Mod: CPTII,S$GLB,, | Performed by: PHYSICIAN ASSISTANT

## 2023-02-07 PROCEDURE — 3074F SYST BP LT 130 MM HG: CPT | Mod: CPTII,S$GLB,, | Performed by: PHYSICIAN ASSISTANT

## 2023-02-07 PROCEDURE — 3079F DIAST BP 80-89 MM HG: CPT | Mod: CPTII,S$GLB,, | Performed by: PHYSICIAN ASSISTANT

## 2023-02-07 PROCEDURE — 99386 PR PREVENTIVE VISIT,NEW,40-64: ICD-10-PCS | Mod: S$GLB,,, | Performed by: PHYSICIAN ASSISTANT

## 2023-02-07 PROCEDURE — 3079F PR MOST RECENT DIASTOLIC BLOOD PRESSURE 80-89 MM HG: ICD-10-PCS | Mod: CPTII,S$GLB,, | Performed by: PHYSICIAN ASSISTANT

## 2023-02-07 NOTE — PROGRESS NOTES
"Subjective:       Patient ID: Nano White is a 47 y.o. female.    Chief Complaint: Establish Care    Presents to establish care.  She is due for routine blood work, mammogram, colonoscopy and Pap.  She is being followed by [facility].  She recently had lap band removed and has had some weight gain.  She is looking to get a sleeve and is currently followed by Dr. Sampson.  She is not on any medications other than phentermine to try to slow weight gain.  She is had some trouble sleeping in the past due to anxiety but she states she has not had the issue in some time.  She has tried melatonin without relief.  Her previous physician had her on Xanax at bedtime but we discussed that this is not an appropriate use of this medication and she expressed understanding.  Patient states she is allergic to the flu vaccine as she "gets the flu when she gets the flu vaccine. "She believes she had a tetanus vaccine in the last 1-2 years although we do not have record of this.  No complaints today.    Family History   Problem Relation Age of Onset    Heart disease Mother     Diabetes Mother     Obesity Mother     Diabetes Maternal Aunt     Diabetes Maternal Uncle     Diabetes Paternal Aunt     Diabetes Paternal Uncle     Cancer Maternal Grandmother     Cancer Maternal Grandfather     Cancer Paternal Grandmother     Cancer Paternal Grandfather     Obesity Sister     Obesity Sister       Review of patient's allergies indicates:   Allergen Reactions    Chocolate low lactose Hives     Hives and throat swelling with Chocolate    Benadryl [diphenhydramine hcl] Hives    Codeine Hives and Nausea And Vomiting     HIVES          Current Outpatient Medications:     phentermine 15 MG capsule, TAKE 1 CAPSULE(15 MG) BY MOUTH EVERY MORNING, Disp: 30 capsule, Rfl: 0     Past Medical History:   Diagnosis Date    H/O gastric banding 2014    History of removal of laparoscopic gastric banding device 9/12/2022    S/P gastric surgery 3/9/2016 " "     Patient Active Problem List   Diagnosis    Tobacco dependence due to cigarettes [F17.210 (ICD-10-CM)]    Obesity (BMI 35.0-39.9 without comorbidity)        Review of Systems   Constitutional:  Negative for activity change, appetite change, fatigue, fever and unexpected weight change.   HENT:  Negative for nasal congestion, dental problem, hearing loss, postnasal drip, rhinorrhea, sinus pressure/congestion and trouble swallowing.    Eyes:  Negative for photophobia, discharge and visual disturbance.   Respiratory:  Negative for cough, chest tightness, shortness of breath and wheezing.    Cardiovascular:  Negative for chest pain, palpitations and leg swelling.   Gastrointestinal:  Negative for abdominal pain, blood in stool, constipation, diarrhea, nausea and vomiting.   Genitourinary:  Negative for difficulty urinating, dyspareunia, dysuria, hematuria, menstrual problem, pelvic pain, vaginal bleeding, vaginal discharge and vaginal pain.   Musculoskeletal:  Negative for arthralgias, back pain, joint swelling and neck pain.   Integumentary:  Negative for color change and rash.   Neurological:  Negative for dizziness, seizures, weakness, light-headedness, numbness and headaches.   Hematological:  Does not bruise/bleed easily.   Psychiatric/Behavioral:  Negative for agitation, behavioral problems, confusion, decreased concentration, dysphoric mood, hallucinations, self-injury, sleep disturbance and suicidal ideas. The patient is not nervous/anxious and is not hyperactive.        Objective:      Vitals:    02/07/23 0810   BP: 122/80   BP Location: Left arm   Patient Position: Sitting   BP Method: Large (Manual)   Pulse: 66   Temp: 98.7 °F (37.1 °C)   TempSrc: Oral   SpO2: 98%   Weight: 101.2 kg (223 lb 1.7 oz)   Height: 5' 5.5" (1.664 m)      Physical Exam  Constitutional:       Appearance: She is well-developed.   HENT:      Head: Normocephalic and atraumatic.   Eyes:      Conjunctiva/sclera: Conjunctivae normal.     "  Pupils: Pupils are equal, round, and reactive to light.   Neck:      Vascular: No JVD.   Cardiovascular:      Rate and Rhythm: Normal rate and regular rhythm.      Heart sounds: No murmur heard.    No friction rub. No gallop.   Pulmonary:      Effort: Pulmonary effort is normal. No respiratory distress.      Breath sounds: Normal breath sounds. No wheezing or rales.   Musculoskeletal:      Cervical back: Normal range of motion and neck supple.   Skin:     General: Skin is warm and dry.   Neurological:      Mental Status: She is alert and oriented to person, place, and time.   Psychiatric:         Mood and Affect: Mood normal.         Behavior: Behavior normal.         Thought Content: Thought content normal.         Judgment: Judgment normal.       Assessment:       Problem List Items Addressed This Visit       Obesity (BMI 35.0-39.9 without comorbidity)     Other Visit Diagnoses       Annual physical exam    -  Primary    Relevant Orders    Hemoglobin A1C    CBC Auto Differential    Comprehensive Metabolic Panel    Lipid Panel    TSH    Need for hepatitis C screening test        Relevant Orders    Hepatitis C Antibody    Encounter for screening for HIV        Relevant Orders    HIV 1/2 Ag/Ab (4th Gen)    Encounter for screening mammogram for malignant neoplasm of breast        Relevant Orders    Mammo Digital Screening Bilat w/ Sanjay    Colon cancer screening        Relevant Orders    Case Request Endoscopy: COLONOSCOPY (Completed)              Plan:       Nano was seen today for establish care.    Diagnoses and all orders for this visit:    Annual physical exam  -     Hemoglobin A1C; Future  -     CBC Auto Differential; Future  -     Comprehensive Metabolic Panel; Future  -     Lipid Panel; Future  -     TSH; Future    Need for hepatitis C screening test  -     Hepatitis C Antibody; Future    Encounter for screening for HIV  -     HIV 1/2 Ag/Ab (4th Gen); Future    Encounter for screening mammogram for malignant  neoplasm of breast  -     Cancel: Mammo Digital Screening Bilat w/ [facility]jay; Future  -     Mammo Digital Screening Bilat w/ [facility]jay; Future  -     Mammo Digital Screening Bilat w/ Sanjay    Colon cancer screening  -     Case Request Endoscopy: COLONOSCOPY    Obesity (BMI 35.0-39.9 without comorbidity)       Follow up in about 1 year (around 2/7/2024).

## 2023-02-09 ENCOUNTER — PATIENT MESSAGE (OUTPATIENT)
Dept: GASTROENTEROLOGY | Facility: CLINIC | Age: 48
End: 2023-02-09
Payer: COMMERCIAL

## 2023-02-09 ENCOUNTER — TELEPHONE (OUTPATIENT)
Dept: GASTROENTEROLOGY | Facility: CLINIC | Age: 48
End: 2023-02-09
Payer: COMMERCIAL

## 2023-02-10 ENCOUNTER — LAB VISIT (OUTPATIENT)
Dept: LAB | Facility: HOSPITAL | Age: 48
End: 2023-02-10
Attending: PHYSICIAN ASSISTANT
Payer: COMMERCIAL

## 2023-02-10 DIAGNOSIS — Z11.59 NEED FOR HEPATITIS C SCREENING TEST: ICD-10-CM

## 2023-02-10 DIAGNOSIS — Z00.00 ANNUAL PHYSICAL EXAM: ICD-10-CM

## 2023-02-10 DIAGNOSIS — Z11.4 ENCOUNTER FOR SCREENING FOR HIV: ICD-10-CM

## 2023-02-10 LAB
ALBUMIN SERPL BCP-MCNC: 3.5 G/DL (ref 3.5–5.2)
ALP SERPL-CCNC: 44 U/L (ref 55–135)
ALT SERPL W/O P-5'-P-CCNC: 20 U/L (ref 10–44)
ANION GAP SERPL CALC-SCNC: 7 MMOL/L (ref 8–16)
AST SERPL-CCNC: 16 U/L (ref 10–40)
BASOPHILS # BLD AUTO: 0.04 K/UL (ref 0–0.2)
BASOPHILS NFR BLD: 0.6 % (ref 0–1.9)
BILIRUB SERPL-MCNC: 1 MG/DL (ref 0.1–1)
BUN SERPL-MCNC: 15 MG/DL (ref 6–20)
CALCIUM SERPL-MCNC: 9 MG/DL (ref 8.7–10.5)
CHLORIDE SERPL-SCNC: 106 MMOL/L (ref 95–110)
CHOLEST SERPL-MCNC: 207 MG/DL (ref 120–199)
CHOLEST/HDLC SERPL: 3.8 {RATIO} (ref 2–5)
CO2 SERPL-SCNC: 26 MMOL/L (ref 23–29)
CREAT SERPL-MCNC: 0.7 MG/DL (ref 0.5–1.4)
DIFFERENTIAL METHOD: ABNORMAL
EOSINOPHIL # BLD AUTO: 0.1 K/UL (ref 0–0.5)
EOSINOPHIL NFR BLD: 1.8 % (ref 0–8)
ERYTHROCYTE [DISTWIDTH] IN BLOOD BY AUTOMATED COUNT: 12.9 % (ref 11.5–14.5)
EST. GFR  (NO RACE VARIABLE): >60 ML/MIN/1.73 M^2
ESTIMATED AVG GLUCOSE: 97 MG/DL (ref 68–131)
GLUCOSE SERPL-MCNC: 88 MG/DL (ref 70–110)
HBA1C MFR BLD: 5 % (ref 4–5.6)
HCT VFR BLD AUTO: 42 % (ref 37–48.5)
HCV AB SERPL QL IA: NORMAL
HDLC SERPL-MCNC: 55 MG/DL (ref 40–75)
HDLC SERPL: 26.6 % (ref 20–50)
HGB BLD-MCNC: 13.5 G/DL (ref 12–16)
HIV 1+2 AB+HIV1 P24 AG SERPL QL IA: NORMAL
IMM GRANULOCYTES # BLD AUTO: 0.02 K/UL (ref 0–0.04)
IMM GRANULOCYTES NFR BLD AUTO: 0.3 % (ref 0–0.5)
LDLC SERPL CALC-MCNC: 137.4 MG/DL (ref 63–159)
LYMPHOCYTES # BLD AUTO: 1.3 K/UL (ref 1–4.8)
LYMPHOCYTES NFR BLD: 17.8 % (ref 18–48)
MCH RBC QN AUTO: 31.7 PG (ref 27–31)
MCHC RBC AUTO-ENTMCNC: 32.1 G/DL (ref 32–36)
MCV RBC AUTO: 99 FL (ref 82–98)
MONOCYTES # BLD AUTO: 0.4 K/UL (ref 0.3–1)
MONOCYTES NFR BLD: 5.6 % (ref 4–15)
NEUTROPHILS # BLD AUTO: 5.3 K/UL (ref 1.8–7.7)
NEUTROPHILS NFR BLD: 73.9 % (ref 38–73)
NONHDLC SERPL-MCNC: 152 MG/DL
NRBC BLD-RTO: 0 /100 WBC
PLATELET # BLD AUTO: 231 K/UL (ref 150–450)
PMV BLD AUTO: 10.8 FL (ref 9.2–12.9)
POTASSIUM SERPL-SCNC: 3.9 MMOL/L (ref 3.5–5.1)
PROT SERPL-MCNC: 7.1 G/DL (ref 6–8.4)
RBC # BLD AUTO: 4.26 M/UL (ref 4–5.4)
SODIUM SERPL-SCNC: 139 MMOL/L (ref 136–145)
TRIGL SERPL-MCNC: 73 MG/DL (ref 30–150)
TSH SERPL DL<=0.005 MIU/L-ACNC: 1.92 UIU/ML (ref 0.4–4)
WBC # BLD AUTO: 7.15 K/UL (ref 3.9–12.7)

## 2023-02-10 PROCEDURE — 85025 COMPLETE CBC W/AUTO DIFF WBC: CPT | Performed by: PHYSICIAN ASSISTANT

## 2023-02-10 PROCEDURE — 80061 LIPID PANEL: CPT | Performed by: PHYSICIAN ASSISTANT

## 2023-02-10 PROCEDURE — 86803 HEPATITIS C AB TEST: CPT | Performed by: PHYSICIAN ASSISTANT

## 2023-02-10 PROCEDURE — 36415 COLL VENOUS BLD VENIPUNCTURE: CPT | Performed by: PHYSICIAN ASSISTANT

## 2023-02-10 PROCEDURE — 87389 HIV-1 AG W/HIV-1&-2 AB AG IA: CPT | Performed by: PHYSICIAN ASSISTANT

## 2023-02-10 PROCEDURE — 84443 ASSAY THYROID STIM HORMONE: CPT | Performed by: PHYSICIAN ASSISTANT

## 2023-02-10 PROCEDURE — 80053 COMPREHEN METABOLIC PANEL: CPT | Performed by: PHYSICIAN ASSISTANT

## 2023-02-10 PROCEDURE — 83036 HEMOGLOBIN GLYCOSYLATED A1C: CPT | Performed by: PHYSICIAN ASSISTANT

## 2023-02-13 ENCOUNTER — PATIENT MESSAGE (OUTPATIENT)
Dept: GASTROENTEROLOGY | Facility: CLINIC | Age: 48
End: 2023-02-13
Payer: COMMERCIAL

## 2023-03-06 ENCOUNTER — OFFICE VISIT (OUTPATIENT)
Dept: BARIATRICS | Facility: CLINIC | Age: 48
End: 2023-03-06
Payer: COMMERCIAL

## 2023-03-06 VITALS
TEMPERATURE: 98 F | DIASTOLIC BLOOD PRESSURE: 78 MMHG | WEIGHT: 224 LBS | SYSTOLIC BLOOD PRESSURE: 117 MMHG | HEIGHT: 66 IN | BODY MASS INDEX: 36 KG/M2 | RESPIRATION RATE: 16 BRPM | HEART RATE: 62 BPM

## 2023-03-06 DIAGNOSIS — Z98.84 HX OF LAPAROSCOPIC ADJUSTABLE GASTRIC BANDING: ICD-10-CM

## 2023-03-06 DIAGNOSIS — G47.33 OSA (OBSTRUCTIVE SLEEP APNEA): ICD-10-CM

## 2023-03-06 DIAGNOSIS — F17.210 TOBACCO DEPENDENCE DUE TO CIGARETTES: ICD-10-CM

## 2023-03-06 DIAGNOSIS — E66.9 OBESITY (BMI 30-39.9): ICD-10-CM

## 2023-03-06 DIAGNOSIS — E66.01 MORBID OBESITY: Primary | ICD-10-CM

## 2023-03-06 DIAGNOSIS — E78.00 HIGH CHOLESTEROL: ICD-10-CM

## 2023-03-06 PROCEDURE — 1159F PR MEDICATION LIST DOCUMENTED IN MEDICAL RECORD: ICD-10-PCS | Mod: CPTII,S$GLB,, | Performed by: NURSE PRACTITIONER

## 2023-03-06 PROCEDURE — 1159F MED LIST DOCD IN RCRD: CPT | Mod: CPTII,S$GLB,, | Performed by: NURSE PRACTITIONER

## 2023-03-06 PROCEDURE — 3078F DIAST BP <80 MM HG: CPT | Mod: CPTII,S$GLB,, | Performed by: NURSE PRACTITIONER

## 2023-03-06 PROCEDURE — 99999 PR PBB SHADOW E&M-EST. PATIENT-LVL IV: ICD-10-PCS | Mod: PBBFAC,,, | Performed by: NURSE PRACTITIONER

## 2023-03-06 PROCEDURE — 1160F PR REVIEW ALL MEDS BY PRESCRIBER/CLIN PHARMACIST DOCUMENTED: ICD-10-PCS | Mod: CPTII,S$GLB,, | Performed by: NURSE PRACTITIONER

## 2023-03-06 PROCEDURE — 1160F RVW MEDS BY RX/DR IN RCRD: CPT | Mod: CPTII,S$GLB,, | Performed by: NURSE PRACTITIONER

## 2023-03-06 PROCEDURE — 3044F PR MOST RECENT HEMOGLOBIN A1C LEVEL <7.0%: ICD-10-PCS | Mod: CPTII,S$GLB,, | Performed by: NURSE PRACTITIONER

## 2023-03-06 PROCEDURE — 3074F SYST BP LT 130 MM HG: CPT | Mod: CPTII,S$GLB,, | Performed by: NURSE PRACTITIONER

## 2023-03-06 PROCEDURE — 3044F HG A1C LEVEL LT 7.0%: CPT | Mod: CPTII,S$GLB,, | Performed by: NURSE PRACTITIONER

## 2023-03-06 PROCEDURE — 99999 PR PBB SHADOW E&M-EST. PATIENT-LVL IV: CPT | Mod: PBBFAC,,, | Performed by: NURSE PRACTITIONER

## 2023-03-06 PROCEDURE — 3074F PR MOST RECENT SYSTOLIC BLOOD PRESSURE < 130 MM HG: ICD-10-PCS | Mod: CPTII,S$GLB,, | Performed by: NURSE PRACTITIONER

## 2023-03-06 PROCEDURE — 99213 PR OFFICE/OUTPT VISIT, EST, LEVL III, 20-29 MIN: ICD-10-PCS | Mod: S$GLB,,, | Performed by: NURSE PRACTITIONER

## 2023-03-06 PROCEDURE — 3078F PR MOST RECENT DIASTOLIC BLOOD PRESSURE < 80 MM HG: ICD-10-PCS | Mod: CPTII,S$GLB,, | Performed by: NURSE PRACTITIONER

## 2023-03-06 PROCEDURE — 99213 OFFICE O/P EST LOW 20 MIN: CPT | Mod: S$GLB,,, | Performed by: NURSE PRACTITIONER

## 2023-03-06 PROCEDURE — 3008F BODY MASS INDEX DOCD: CPT | Mod: CPTII,S$GLB,, | Performed by: NURSE PRACTITIONER

## 2023-03-06 PROCEDURE — 3008F PR BODY MASS INDEX (BMI) DOCUMENTED: ICD-10-PCS | Mod: CPTII,S$GLB,, | Performed by: NURSE PRACTITIONER

## 2023-03-06 RX ORDER — PHENTERMINE HYDROCHLORIDE 15 MG/1
CAPSULE ORAL
Qty: 30 CAPSULE | Refills: 0 | Status: SHIPPED | OUTPATIENT
Start: 2023-03-06 | End: 2023-04-27

## 2023-03-06 NOTE — PROGRESS NOTES
Medically Supervised Weight Loss Documentation    Chief Complaint   Patient presents with    Follow-up    Obesity       History of Present Illness:  Patient is a 47 y.o. female who is referred for evaluation of surgical treatment of morbid obesity. Patient has a Body mass index is 36.71 kg/m². kg/m².  She has known comorbidities of obstructive sleep apnea. Patient has attended the bariatric seminar and is most interested in gastric sleeve surgery surgery evaluation of surgical treatment of morbid obesity.       Comorbidities:   Past Medical History:   Diagnosis Date    H/O gastric banding     History of removal of laparoscopic gastric banding device 2022    Obstructive sleep apnea     S/P gastric surgery 2016     Past Surgical History:   Procedure Laterality Date    BREAST REDUCTION      BREAST SURGERY      KNEE SURGERY      LAPAROSCOPIC GASTRIC BANDING      LAPAROSCOPIC REMOVAL OF GASTRIC BAND N/A 2022    Procedure: REMOVAL, GASTRIC BAND, LAPAROSCOPIC;  Surgeon: Paresh Sampson MD;  Location: [facility];  Service: General;  Laterality: N/A;     Family History   Problem Relation Age of Onset    Heart disease Mother     Diabetes Mother     Obesity Mother     Diabetes Maternal Aunt     Diabetes Maternal Uncle     Diabetes Paternal Aunt     Diabetes Paternal Uncle     Cancer Maternal Grandmother     Cancer Maternal Grandfather     Cancer Paternal Grandmother     Cancer Paternal Grandfather     Obesity Sister     Obesity Sister      Social History     Tobacco Use    Smoking status: Former     Packs/day: 0.50     Types: Cigarettes     Quit date: 10/18/2022     Years since quittin.3    Smokeless tobacco: Never   Substance Use Topics    Alcohol use: Yes     Comment: Socially    Drug use: Never        Review of patient's allergies indicates:   Allergen Reactions    Chocolate low lactose Hives     Hives and throat swelling with Chocolate    Benadryl [diphenhydramine  hcl] Hives    Codeine Hives and Nausea And Vomiting     HIVES       Medications:  No current outpatient medications on file prior to visit.     No current facility-administered medications on file prior to visit.         Body mass index is 36.71 kg/m².     Beginning Weight: 218 lbs    Last Weight: 220 lbs    Current Weight: 224 lbs   Weight Change: +5 lbs      Body comp:  Fat Percent:  45 %  Fat Mass:  100 lb  FFM:  123.2 lb  TBW: 89.4 lb  TBW %:  39.9 %  BMR: 1729 kcal    Wt Readings from Last 5 Encounters:   03/06/23 101.6 kg (224 lb)   02/07/23 101.2 kg (223 lb 1.7 oz)   02/01/23 103.7 kg (228 lb 9.9 oz)   01/04/23 100 kg (220 lb 6.4 oz)   12/05/22 99.7 kg (219 lb 12.8 oz)         Chart review:  Primary Care Physician: Josef      Lab review:  Most Recent Data:  CBC:   Lab Results   Component Value Date    WBC 7.15 02/10/2023    HGB 13.5 02/10/2023    HCT 42.0 02/10/2023     02/10/2023    MCV 99 (H) 02/10/2023    RDW 12.9 02/10/2023     BMP:   Lab Results   Component Value Date     02/10/2023    K 3.9 02/10/2023     02/10/2023    CO2 26 02/10/2023    BUN 15 02/10/2023    CREATININE 0.7 02/10/2023    GLU 88 02/10/2023    CALCIUM 9.0 02/10/2023     LFTs:   Lab Results   Component Value Date    PROT 7.1 02/10/2023    ALBUMIN 3.5 02/10/2023    BILITOT 1.0 02/10/2023    AST 16 02/10/2023    ALKPHOS 44 (L) 02/10/2023    ALT 20 02/10/2023     Coags: No results found for: INR, PROTIME, PTT  FLP:   Lab Results   Component Value Date    CHOL 207 (H) 02/10/2023    HDL 55 02/10/2023    LDLCALC 137.4 02/10/2023    TRIG 73 02/10/2023    CHOLHDL 26.6 02/10/2023     DM:   Lab Results   Component Value Date    HGBA1C 5.0 02/10/2023    LDLCALC 137.4 02/10/2023    CREATININE 0.7 02/10/2023     Thyroid:   Lab Results   Component Value Date    TSH 1.921 02/10/2023     Anemia: No results found for: IRON, TIBC, FERRITIN, AERBHXYK19, FOLATE  Cardiac: No results found for: TROPONINI, CKTOTAL, CKMB, BNP  Urinalysis: No  "results found for: LABURIN, COLORU, PHUA, CLARITYU, SPECGRAV, LABSPEC, NITRITE, PROTEINUR, GLUCOSEU, KETONESU, UROBILINOGEN, BILIRUBINUR, BLOODU, RBCU, WBCUA      Radiology review: Images independently reviewed and interpreted.      Other Results:  EKG (my interpretation): sinus bradycardia.        Review of Systems:  Review of Systems   Constitutional:  Positive for unexpected weight change. Negative for chills, diaphoresis and fever.   HENT:  Negative for congestion, sinus pressure, sneezing, sore throat, tinnitus and voice change.    Eyes:  Negative for pain, redness and itching.   Respiratory:  Negative for apnea, cough, choking, chest tightness, shortness of breath, wheezing and stridor.    Cardiovascular:  Negative for chest pain, palpitations and leg swelling.   Gastrointestinal:  Negative for abdominal pain, anal bleeding, constipation, diarrhea, nausea and vomiting.   Endocrine: Negative for cold intolerance and heat intolerance.   Genitourinary:  Negative for difficulty urinating and dysuria.   Musculoskeletal:  Positive for back pain. Negative for arthralgias and gait problem.   Skin:  Negative for rash and wound.   Allergic/Immunologic: Negative for environmental allergies and food allergies.   Neurological:  Negative for dizziness, light-headedness and headaches.   Hematological:  Negative for adenopathy. Does not bruise/bleed easily.   Psychiatric/Behavioral:  Negative for agitation and confusion.    All other systems reviewed and are negative.    Physical:     Vital Signs (Most Recent)  Temp: 98.2 °F (36.8 °C) (03/06/23 1527)  Pulse: 62 (03/06/23 1527)  Resp: 16 (03/06/23 1527)  BP: 117/78 (03/06/23 1527)  5' 5.5" (1.664 m)  101.6 kg (224 lb)     Physical Exam:  Physical Exam  Vitals and nursing note reviewed.   Constitutional:       General: She is not in acute distress.     Appearance: Normal appearance. She is obese. She is not ill-appearing or toxic-appearing.   HENT:      Head: Normocephalic and " atraumatic.      Right Ear: External ear normal.      Left Ear: External ear normal.      Nose: Nose normal. No congestion or rhinorrhea.      Mouth/Throat:      Mouth: Mucous membranes are moist.      Pharynx: Oropharynx is clear.   Eyes:      General:         Right eye: No discharge.         Left eye: No discharge.      Conjunctiva/sclera: Conjunctivae normal.   Cardiovascular:      Rate and Rhythm: Normal rate and regular rhythm.      Pulses: Normal pulses.      Heart sounds: Normal heart sounds. No murmur heard.  Pulmonary:      Effort: Pulmonary effort is normal. No respiratory distress.      Breath sounds: Normal breath sounds. No stridor. No wheezing.   Chest:      Chest wall: No tenderness.   Abdominal:      General: Bowel sounds are normal. There is no distension.      Palpations: There is no mass.      Tenderness: There is no abdominal tenderness. There is no guarding.      Hernia: No hernia is present.   Musculoskeletal:         General: No swelling, tenderness, deformity or signs of injury. Normal range of motion.      Right lower leg: No edema.      Left lower leg: No edema.   Skin:     General: Skin is warm and dry.      Capillary Refill: Capillary refill takes less than 2 seconds.      Coloration: Skin is not jaundiced or pale.      Findings: No bruising, erythema or rash.   Neurological:      General: No focal deficit present.      Mental Status: She is alert and oriented to person, place, and time.      Motor: No weakness.      Gait: Gait normal.   Psychiatric:         Mood and Affect: Mood normal.         Behavior: Behavior normal.         Thought Content: Thought content normal.         Judgment: Judgment normal.       ASSESSMENT/PLAN:        1. Morbid obesity        2. Obesity (BMI 30-39.9)        3. ADELIA (obstructive sleep apnea)        4. Hx of laparoscopic adjustable gastric banding        5. Tobacco dependence due to cigarettes [F17.210 (ICD-10-CM)]            Continue with Dr. Sampson's  established plan on initial evaluation        MSD 5/6     Patient will need:     Labs-   EKG- done  dietary consult- done  psych consult- done  Seminar- done  Sleep study- done     Will obtain the following clearances prior to surgery:   none      Diet Education Discussed:  Recommend high protein, low carb meals- mainly meats and vegetables.    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- tuna and tuna/cucumbers, tia noodles  Snack: nuts, apples, pickles  Dinner:  home  meals that is low carb- meatballs with green beans  Water: 1 gallon  Decaf crystal light tea  Occasionally skips lunch    MVI: baripal, vit E, Vit C      Exercise/Activity Discussed: Recommend Cardiovascular exercise, get HR over 100 for 20 minutes 3 times per week-  1 hour at gym per day- weight one day, then cardio      Plan for this patient:  Diet adherence   No skipping meals   Continue meal prep  Tanita scale results reviewed with patient (compared to January 2023)   Increased 8 lbs of muscle   Decreased 5 lbs in fat  Exercise/Activity adherence-   continue activity  Fall reviewed with patient  Transition to OTC multivitamins- discussion of post-operative life long MVI need, including Calcium, and a B-Complex  preventative counseling- Lmp 2/21/23- not currently on birth control. Pregnancy: Pt aware that is it not recommended to become pregnant for 18 month after bariatric surgery. She will be tested for pregnancy on the day of surgery. Contraceptives should be used. Contact OB/GYN for various methods and recommendations.  Discussion on surgery vs weight loss medication- will attempt weight loss without surgery at this time--  Will continue low dose Phentermine-   Discussion of bypass vs sleeve  Discussed preoperative diet        I spent a total of 23 minutes on the day of the visit.This includes face to face time and non-face to face time preparing to see the  patient (eg, review of tests), obtaining and/or reviewing separately obtained history, documenting clinical information in the electronic or other health record, independently interpreting results and communicating results to the patient/family/caregiver, or care coordinator.

## 2023-03-06 NOTE — PATIENT INSTRUCTIONS
Pre op Diet    Breakfast- protein shake  Lunch- protein shake  Dinner- 3 ounces of meat and vegetables     NO SNACKING  Only water to drink

## 2023-03-07 ENCOUNTER — OFFICE VISIT (OUTPATIENT)
Dept: FAMILY MEDICINE | Facility: CLINIC | Age: 48
End: 2023-03-07
Payer: COMMERCIAL

## 2023-03-07 DIAGNOSIS — E66.9 OBESITY (BMI 35.0-39.9 WITHOUT COMORBIDITY): Primary | ICD-10-CM

## 2023-03-07 PROCEDURE — 3044F HG A1C LEVEL LT 7.0%: CPT | Mod: CPTII,95,, | Performed by: PHYSICIAN ASSISTANT

## 2023-03-07 PROCEDURE — 99213 PR OFFICE/OUTPT VISIT, EST, LEVL III, 20-29 MIN: ICD-10-PCS | Mod: 95,,, | Performed by: PHYSICIAN ASSISTANT

## 2023-03-07 PROCEDURE — 1159F PR MEDICATION LIST DOCUMENTED IN MEDICAL RECORD: ICD-10-PCS | Mod: CPTII,95,, | Performed by: PHYSICIAN ASSISTANT

## 2023-03-07 PROCEDURE — 1160F RVW MEDS BY RX/DR IN RCRD: CPT | Mod: CPTII,95,, | Performed by: PHYSICIAN ASSISTANT

## 2023-03-07 PROCEDURE — 1160F PR REVIEW ALL MEDS BY PRESCRIBER/CLIN PHARMACIST DOCUMENTED: ICD-10-PCS | Mod: CPTII,95,, | Performed by: PHYSICIAN ASSISTANT

## 2023-03-07 PROCEDURE — 1159F MED LIST DOCD IN RCRD: CPT | Mod: CPTII,95,, | Performed by: PHYSICIAN ASSISTANT

## 2023-03-07 PROCEDURE — 3044F PR MOST RECENT HEMOGLOBIN A1C LEVEL <7.0%: ICD-10-PCS | Mod: CPTII,95,, | Performed by: PHYSICIAN ASSISTANT

## 2023-03-07 PROCEDURE — 99213 OFFICE O/P EST LOW 20 MIN: CPT | Mod: 95,,, | Performed by: PHYSICIAN ASSISTANT

## 2023-03-07 NOTE — PROGRESS NOTES
The patient location is: home  The chief complaint leading to consultation is: needs a letter for bariatric surgery    Visit type: audiovisual    Face to Face time with patient: 10 minutes of total time spent on the encounter, which includes face to face time and non-face to face time preparing to see the patient (eg, review of tests), Obtaining and/or reviewing separately obtained history, Documenting clinical information in the electronic or other health record, Independently interpreting results (not separately reported) and communicating results to the patient/family/caregiver, or Care coordination (not separately reported).         Each patient to whom he or she provides medical services by telemedicine is:  (1) informed of the relationship between the physician and patient and the respective role of any other health care provider with respect to management of the patient; and (2) notified that he or she may decline to receive medical services by telemedicine and may withdraw from such care at any time.    Notes:  Patient was seen today for telemedicine to obtain a letter stating that she is a good candidate for bariatric surgery for her insurance.  She has already seen the dietitian and Psychiatry and been cleared for surgery and surgery has been scheduled.  Patient does suffer from obstructive sleep apnea related to her weight and her current BMI is 36.71.  I agree that the patient is a good candidate and have written the letter which is enclosed in her chart.  Patient did not have any other needs or complaints today and states she has scheduled all of her health maintenance studies that we discussed at her previous appointment.    Diagnoses and all orders for this visit:    Obesity (BMI 35.0-39.9 without comorbidity)  Comments:  Letter written in letter section of chart.       Answers submitted by the patient for this visit:  Review of Systems Questionnaire (Submitted on 3/7/2023)  activity change:  No  unexpected weight change: No  neck pain: No  hearing loss: No  rhinorrhea: No  trouble swallowing: No  eye discharge: No  visual disturbance: No  chest tightness: No  wheezing: No  chest pain: No  palpitations: No  blood in stool: No  constipation: No  vomiting: No  diarrhea: No  polydipsia: No  polyuria: No  difficulty urinating: No  hematuria: No  menstrual problem: No  dysuria: No  joint swelling: No  arthralgias: No  headaches: No  weakness: No  confusion: No  dysphoric mood: No

## 2023-04-04 ENCOUNTER — OFFICE VISIT (OUTPATIENT)
Dept: BARIATRICS | Facility: CLINIC | Age: 48
End: 2023-04-04
Payer: COMMERCIAL

## 2023-04-04 VITALS
TEMPERATURE: 99 F | WEIGHT: 226.81 LBS | DIASTOLIC BLOOD PRESSURE: 90 MMHG | HEART RATE: 58 BPM | SYSTOLIC BLOOD PRESSURE: 137 MMHG | HEIGHT: 66 IN | BODY MASS INDEX: 36.45 KG/M2 | RESPIRATION RATE: 16 BRPM

## 2023-04-04 DIAGNOSIS — E66.01 MORBID OBESITY: Primary | ICD-10-CM

## 2023-04-04 DIAGNOSIS — E78.00 HIGH CHOLESTEROL: ICD-10-CM

## 2023-04-04 DIAGNOSIS — G47.33 OSA (OBSTRUCTIVE SLEEP APNEA): ICD-10-CM

## 2023-04-04 PROCEDURE — 3044F PR MOST RECENT HEMOGLOBIN A1C LEVEL <7.0%: ICD-10-PCS | Mod: CPTII,S$GLB,, | Performed by: SURGERY

## 2023-04-04 PROCEDURE — 3080F PR MOST RECENT DIASTOLIC BLOOD PRESSURE >= 90 MM HG: ICD-10-PCS | Mod: CPTII,S$GLB,, | Performed by: SURGERY

## 2023-04-04 PROCEDURE — 3075F PR MOST RECENT SYSTOLIC BLOOD PRESS GE 130-139MM HG: ICD-10-PCS | Mod: CPTII,S$GLB,, | Performed by: SURGERY

## 2023-04-04 PROCEDURE — 3044F HG A1C LEVEL LT 7.0%: CPT | Mod: CPTII,S$GLB,, | Performed by: SURGERY

## 2023-04-04 PROCEDURE — 3075F SYST BP GE 130 - 139MM HG: CPT | Mod: CPTII,S$GLB,, | Performed by: SURGERY

## 2023-04-04 PROCEDURE — 99999 PR PBB SHADOW E&M-EST. PATIENT-LVL III: ICD-10-PCS | Mod: PBBFAC,,, | Performed by: SURGERY

## 2023-04-04 PROCEDURE — 99999 PR PBB SHADOW E&M-EST. PATIENT-LVL III: CPT | Mod: PBBFAC,,, | Performed by: SURGERY

## 2023-04-04 PROCEDURE — 1159F PR MEDICATION LIST DOCUMENTED IN MEDICAL RECORD: ICD-10-PCS | Mod: CPTII,S$GLB,, | Performed by: SURGERY

## 2023-04-04 PROCEDURE — 3080F DIAST BP >= 90 MM HG: CPT | Mod: CPTII,S$GLB,, | Performed by: SURGERY

## 2023-04-04 PROCEDURE — 1159F MED LIST DOCD IN RCRD: CPT | Mod: CPTII,S$GLB,, | Performed by: SURGERY

## 2023-04-04 PROCEDURE — 99214 PR OFFICE/OUTPT VISIT, EST, LEVL IV, 30-39 MIN: ICD-10-PCS | Mod: S$GLB,,, | Performed by: SURGERY

## 2023-04-04 PROCEDURE — 3008F PR BODY MASS INDEX (BMI) DOCUMENTED: ICD-10-PCS | Mod: CPTII,S$GLB,, | Performed by: SURGERY

## 2023-04-04 PROCEDURE — 99214 OFFICE O/P EST MOD 30 MIN: CPT | Mod: S$GLB,,, | Performed by: SURGERY

## 2023-04-04 PROCEDURE — 3008F BODY MASS INDEX DOCD: CPT | Mod: CPTII,S$GLB,, | Performed by: SURGERY

## 2023-04-04 NOTE — PROGRESS NOTES
Follow up    SUBJECTIVE:     Chief Complaint   Patient presents with    Obesity       History of Present Illness:    Patient is a 47 y.o. female who is referred for evaluation of surgical treatment of morbid obesity. Her Body mass index is 37.17 kg/m². She has known comorbidities of dyslipidemia and obstructive sleep apnea. She has not attended the bariatric seminar and is most interested in gastric sleeve surgery.    Review of patient's allergies indicates:   Allergen Reactions    Chocolate low lactose Hives     Hives and throat swelling with Chocolate    Benadryl [diphenhydramine hcl] Hives    Phentermine Hives and Itching    Codeine Hives and Nausea And Vomiting     HIVES       Current Outpatient Medications   Medication Sig Dispense Refill    multivit-min/iron/folic acid/K (BARIATRIC MULTIVITAMINS ORAL) Take 1 capsule by mouth Daily.      phentermine 15 MG capsule TAKE 1 CAPSULE(15 MG) BY MOUTH EVERY MORNING (Patient not taking: Reported on 4/4/2023) 30 capsule 0     No current facility-administered medications for this visit.       Past Medical History:   Diagnosis Date    H/O gastric banding 2014    History of removal of laparoscopic gastric banding device 09/12/2022    Obstructive sleep apnea     S/P gastric surgery 03/09/2016     Past Surgical History:   Procedure Laterality Date    BREAST REDUCTION      BREAST SURGERY      KNEE SURGERY      LAPAROSCOPIC GASTRIC BANDING      LAPAROSCOPIC REMOVAL OF GASTRIC BAND N/A 8/19/2022    Procedure: REMOVAL, GASTRIC BAND, LAPAROSCOPIC;  Surgeon: Paresh Sampson MD;  Location: [facility];  Service: General;  Laterality: N/A;     Family History   Problem Relation Age of Onset    Heart disease Mother     Diabetes Mother     Obesity Mother     Diabetes Maternal Aunt     Diabetes Maternal Uncle     Diabetes Paternal Aunt     Diabetes Paternal Uncle     Cancer Maternal Grandmother     Cancer Maternal Grandfather     Cancer Paternal Grandmother     Cancer Paternal Grandfather  "    Obesity Sister     Obesity Sister      Social History     Tobacco Use    Smoking status: Former     Packs/day: 0.50     Types: Cigarettes     Quit date: 10/18/2022     Years since quittin.4    Smokeless tobacco: Never   Substance Use Topics    Alcohol use: Yes     Comment: Socially    Drug use: Never        Review of Systems   Constitutional:  Positive for unexpected weight change. Negative for chills, diaphoresis and fever.   HENT:  Negative for congestion, sinus pressure, sneezing, sore throat, tinnitus and voice change.    Eyes:  Negative for pain, redness and itching.   Respiratory:  Negative for apnea, cough, choking, chest tightness, shortness of breath, wheezing and stridor.    Cardiovascular:  Negative for chest pain, palpitations and leg swelling.   Gastrointestinal:  Negative for abdominal pain, anal bleeding, constipation, diarrhea, nausea and vomiting.   Endocrine: Negative for cold intolerance and heat intolerance.   Genitourinary:  Negative for difficulty urinating and dysuria.   Musculoskeletal:  Positive for back pain. Negative for arthralgias and gait problem.   Skin:  Negative for rash and wound.   Allergic/Immunologic: Negative for environmental allergies and food allergies.   Neurological:  Negative for dizziness, light-headedness and headaches.   Hematological:  Negative for adenopathy. Does not bruise/bleed easily.   Psychiatric/Behavioral:  Negative for agitation and confusion.    All other systems reviewed and are negative.    OBJECTIVE:     Vital Signs (Most Recent)  Temp: 98.6 °F (37 °C) (23 1039)  Pulse: (!) 58 (23 103)  Resp: 16 (23 103)  BP: (!) 137/90 (23 1039)  5' 5.5" (1.664 m)  102.9 kg (226 lb 12.8 oz)     Body comp:  Fat Percent:  48.7 %  Fat Mass:  110.4 lb  FFM:  116.4 lb  TBW: 84.6 lb  TBW %:  37.3 %  BMR: 1660 kcal        Physical Exam:  Physical Exam  Vitals and nursing note reviewed.   Constitutional:       General: She is not in acute " distress.     Appearance: Normal appearance. She is obese. She is not ill-appearing or toxic-appearing.   HENT:      Head: Normocephalic and atraumatic.      Right Ear: External ear normal.      Left Ear: External ear normal.      Nose: Nose normal. No congestion or rhinorrhea.      Mouth/Throat:      Mouth: Mucous membranes are moist.      Pharynx: Oropharynx is clear.   Eyes:      General:         Right eye: No discharge.         Left eye: No discharge.      Conjunctiva/sclera: Conjunctivae normal.   Cardiovascular:      Rate and Rhythm: Normal rate and regular rhythm.      Pulses: Normal pulses.      Heart sounds: Normal heart sounds. No murmur heard.  Pulmonary:      Effort: Pulmonary effort is normal. No respiratory distress.      Breath sounds: Normal breath sounds. No stridor. No wheezing.   Chest:      Chest wall: No tenderness.   Abdominal:      General: Bowel sounds are normal. There is no distension.      Palpations: There is no mass.      Tenderness: There is no abdominal tenderness. There is no guarding.      Hernia: No hernia is present.   Musculoskeletal:         General: No swelling, tenderness, deformity or signs of injury. Normal range of motion.      Right lower leg: No edema.      Left lower leg: No edema.   Skin:     General: Skin is warm and dry.      Capillary Refill: Capillary refill takes less than 2 seconds.      Coloration: Skin is not jaundiced or pale.      Findings: No bruising, erythema or rash.   Neurological:      General: No focal deficit present.      Mental Status: She is alert and oriented to person, place, and time.      Motor: No weakness.      Gait: Gait normal.   Psychiatric:         Mood and Affect: Mood normal.         Behavior: Behavior normal.         Thought Content: Thought content normal.         Judgment: Judgment normal.       ASSESSMENT/PLAN:          EKG:  done    Psych: done  Dietician: done      1. Morbid obesity        2. BMI 36.0-36.9,adult        3. High  cholesterol        4. ADELIA (obstructive sleep apnea)            Plan:  Nano White has morbid obesity as their Body mass index is 37.17 kg/m². She would benefit from weight loss surgery and has chosen gastric sleeve surgery as the preferred procedure. She understands that this is a tool and lifestyle change will be necessary to keep weight off. I went over possible complications of the chosen surgery with the patient and she is agreeable to surgery.    She has been instructed to start the pre operative diet.    She surgical date is May 1, [facility]    HX OF LAP BAND    The patient has been taught the post operative diet and understands that she will need to stay on this diet to prevent complication.  They are aware of the post operative follow up and return to see me after surgery to treat/prevent/identify any complications.  she has been advised to attend support groups post operatively.

## 2023-04-04 NOTE — PATIENT INSTRUCTIONS
Pre op Diet- start 4/17/23    Breakfast- protein shake  Lunch- protein shake  Dinner- 3 ounces of meat and vegetables ( from list)    NO SNACKING  Only water to drink

## 2023-04-13 ENCOUNTER — TELEPHONE (OUTPATIENT)
Dept: BARIATRICS | Facility: CLINIC | Age: 48
End: 2023-04-13
Payer: COMMERCIAL

## 2023-04-13 ENCOUNTER — PATIENT MESSAGE (OUTPATIENT)
Dept: BARIATRICS | Facility: CLINIC | Age: 48
End: 2023-04-13
Payer: COMMERCIAL

## 2023-04-13 NOTE — TELEPHONE ENCOUNTER
Returned call to pt, she reports that she would like to change her surgery type to a gastric bypass, instead of a gastric sleeve. Message sent to Dr. Sampson and the bariatric coordinator, Ronna. Emailed the  to initiate new auth. Changed pre-admit appt to [facility] from [facility].     ----- Message from Olinda Rojas sent at 4/13/2023  2:59 PM CDT -----  Contact: pt  Pt is calling and would like a call back  Please give pt a call back [phone]

## 2023-04-19 ENCOUNTER — DOCUMENTATION ONLY (OUTPATIENT)
Dept: BARIATRICS | Facility: CLINIC | Age: 48
End: 2023-04-19
Payer: COMMERCIAL

## 2023-04-19 NOTE — PROGRESS NOTES
On 4/16,  Nano White requested to nursing staff a change to her surgery type from gastric sleeve (CPT 75862) to gastric bypass (CPT 82778). Both surgeries were discussed at length during previous visits on 12/5/2022 and 3/6/2023. Patient reecived information on surgical with risk vs benefits, based on her PMH and PSH of lap band placement and removal.     Nano White will now be having gastric bypass (CPT 51105). Her surgical authorization for surgery will be updated by [facility], Inga Saavedra. She will notify patient when auth has been approved      Nano White has morbid obesity as their Body mass index is 37.17 kg/m². She would benefit from weight loss surgery and has chosen gastric bypass surgery as the preferred procedure. She understands that this is a tool and lifestyle change will be necessary to keep weight off. I went over possible complications of the chosen surgery with the patient and she is agreeable to surgery.     She has been instructed to start the pre operative diet.     She surgical date is May 1, 2023 at [facility]     HX OF LAP BAND     The patient has been taught the post operative diet and understands that she will need to stay on this diet to prevent complication.  They are aware of the post operative follow up and return to see me after surgery to treat/prevent/identify any complications.  she has been advised to attend support groups post operatively.

## 2023-04-25 RX ORDER — HEPARIN SODIUM 5000 [USP'U]/ML
5000 INJECTION, SOLUTION INTRAVENOUS; SUBCUTANEOUS EVERY 8 HOURS
Status: CANCELLED | OUTPATIENT
Start: 2023-04-25

## 2023-04-25 RX ORDER — PANTOPRAZOLE SODIUM 40 MG/10ML
40 INJECTION, POWDER, LYOPHILIZED, FOR SOLUTION INTRAVENOUS DAILY
Status: CANCELLED | OUTPATIENT
Start: 2023-04-25

## 2023-04-25 RX ORDER — SODIUM CHLORIDE 9 MG/ML
INJECTION, SOLUTION INTRAVENOUS CONTINUOUS
Status: CANCELLED | OUTPATIENT
Start: 2023-04-25

## 2023-04-27 ENCOUNTER — HOSPITAL ENCOUNTER (OUTPATIENT)
Dept: RADIOLOGY | Facility: HOSPITAL | Age: 48
Discharge: HOME | End: 2023-04-27
Attending: SURGERY
Payer: COMMERCIAL

## 2023-04-27 ENCOUNTER — HOSPITAL ENCOUNTER (OUTPATIENT)
Dept: PREADMISSION TESTING | Facility: HOSPITAL | Age: 48
Discharge: HOME | End: 2023-04-27
Attending: SURGERY
Payer: COMMERCIAL

## 2023-04-27 ENCOUNTER — CLINICAL SUPPORT (OUTPATIENT)
Dept: BARIATRICS | Facility: CLINIC | Age: 48
End: 2023-04-27
Payer: COMMERCIAL

## 2023-04-27 VITALS — WEIGHT: 226 LBS | HEIGHT: 66 IN | BODY MASS INDEX: 36.32 KG/M2

## 2023-04-27 DIAGNOSIS — Z01.818 PRE-OP EXAM: ICD-10-CM

## 2023-04-27 DIAGNOSIS — E66.01 MORBID OBESITY: Primary | ICD-10-CM

## 2023-04-27 DIAGNOSIS — Z01.818 PREOP TESTING: Primary | ICD-10-CM

## 2023-04-27 LAB
ANION GAP SERPL CALC-SCNC: 9 MMOL/L (ref 8–16)
BASOPHILS # BLD AUTO: 0.05 K/UL (ref 0–0.2)
BASOPHILS NFR BLD: 0.7 % (ref 0–1.9)
BUN SERPL-MCNC: 13 MG/DL (ref 6–20)
CALCIUM SERPL-MCNC: 9.3 MG/DL (ref 8.7–10.5)
CHLORIDE SERPL-SCNC: 102 MMOL/L (ref 95–110)
CO2 SERPL-SCNC: 28 MMOL/L (ref 23–29)
CREAT SERPL-MCNC: 0.7 MG/DL (ref 0.5–1.4)
DIFFERENTIAL METHOD: ABNORMAL
EOSINOPHIL # BLD AUTO: 0.1 K/UL (ref 0–0.5)
EOSINOPHIL NFR BLD: 1.3 % (ref 0–8)
ERYTHROCYTE [DISTWIDTH] IN BLOOD BY AUTOMATED COUNT: 13 % (ref 11.5–14.5)
EST. GFR  (NO RACE VARIABLE): >60 ML/MIN/1.73 M^2
GLUCOSE SERPL-MCNC: 79 MG/DL (ref 70–110)
HCT VFR BLD AUTO: 41 % (ref 37–48.5)
HGB BLD-MCNC: 13.8 G/DL (ref 12–16)
IMM GRANULOCYTES # BLD AUTO: 0.03 K/UL (ref 0–0.04)
IMM GRANULOCYTES NFR BLD AUTO: 0.4 % (ref 0–0.5)
LYMPHOCYTES # BLD AUTO: 1.6 K/UL (ref 1–4.8)
LYMPHOCYTES NFR BLD: 23 % (ref 18–48)
MCH RBC QN AUTO: 32.2 PG (ref 27–31)
MCHC RBC AUTO-ENTMCNC: 33.7 G/DL (ref 32–36)
MCV RBC AUTO: 96 FL (ref 82–98)
MONOCYTES # BLD AUTO: 0.6 K/UL (ref 0.3–1)
MONOCYTES NFR BLD: 9.1 % (ref 4–15)
NEUTROPHILS # BLD AUTO: 4.4 K/UL (ref 1.8–7.7)
NEUTROPHILS NFR BLD: 65.5 % (ref 38–73)
NRBC BLD-RTO: 0 /100 WBC
PLATELET # BLD AUTO: 269 K/UL (ref 150–450)
PMV BLD AUTO: 10.7 FL (ref 9.2–12.9)
POTASSIUM SERPL-SCNC: 3.9 MMOL/L (ref 3.5–5.1)
RBC # BLD AUTO: 4.28 M/UL (ref 4–5.4)
SODIUM SERPL-SCNC: 139 MMOL/L (ref 136–145)
WBC # BLD AUTO: 6.79 K/UL (ref 3.9–12.7)

## 2023-04-27 PROCEDURE — 93010 EKG 12-LEAD: ICD-10-PCS | Mod: ,,, | Performed by: INTERNAL MEDICINE

## 2023-04-27 PROCEDURE — 71046 X-RAY EXAM CHEST 2 VIEWS: CPT | Mod: 26,,, | Performed by: RADIOLOGY

## 2023-04-27 PROCEDURE — 93005 ELECTROCARDIOGRAM TRACING: CPT

## 2023-04-27 PROCEDURE — 93010 ELECTROCARDIOGRAM REPORT: CPT | Mod: ,,, | Performed by: INTERNAL MEDICINE

## 2023-04-27 PROCEDURE — 99499 NO LOS: ICD-10-PCS | Mod: S$GLB,,, | Performed by: SURGERY

## 2023-04-27 PROCEDURE — 99900103 DSU ONLY-NO CHARGE-INITIAL HR (STAT)

## 2023-04-27 PROCEDURE — 99499 UNLISTED E&M SERVICE: CPT | Mod: S$GLB,,, | Performed by: SURGERY

## 2023-04-27 PROCEDURE — 71046 XR CHEST PA AND LATERAL PRE-OP: ICD-10-PCS | Mod: 26,,, | Performed by: RADIOLOGY

## 2023-04-27 PROCEDURE — 85025 COMPLETE CBC W/AUTO DIFF WBC: CPT | Performed by: ANESTHESIOLOGY

## 2023-04-27 PROCEDURE — 99999 PR PBB SHADOW E&M-EST. PATIENT-LVL I: CPT | Mod: PBBFAC,,,

## 2023-04-27 PROCEDURE — 99999 PR PBB SHADOW E&M-EST. PATIENT-LVL I: ICD-10-PCS | Mod: PBBFAC,,,

## 2023-04-27 PROCEDURE — 99900104 DSU ONLY-NO CHARGE-EA ADD'L HR (STAT)

## 2023-04-27 PROCEDURE — 80048 BASIC METABOLIC PNL TOTAL CA: CPT | Performed by: ANESTHESIOLOGY

## 2023-04-27 PROCEDURE — 36415 COLL VENOUS BLD VENIPUNCTURE: CPT | Performed by: ANESTHESIOLOGY

## 2023-04-27 PROCEDURE — 71046 X-RAY EXAM CHEST 2 VIEWS: CPT | Mod: TC,FY

## 2023-04-27 NOTE — PROGRESS NOTES
Confirmed with patient that sheis having a Crystal-en-Y gastric bypass on  05/01/2023 at [facility].    Weight 223.8 lb  Fat%  49.7%  Fat mass 111.2lb   .6lb  TBW  81.8lb  TBW% 36.6%  BMI  36.7        Preop diet reviewed with patient.   Reminded of liquids only (water and protein shakes) on the day before surgery.   Reviewed progression of diet after surgery    ~Clear liquid diet for the 1st week post op with a goal of 64oz of fluid intake daily  ~encouraged protein intake to facilitate the healing process, this can include protein larsen, gatorade zero with protein, protein broth, etc  ~provided patient with (7) Liquicel protein packs to consume 1 pack daily x7 days of post-op week 1  ~avoid food/liquids with temperature extreme of too hot or too cold, as this may cause spasms of the stomach leading to increased pain and complications with the healing process  ~Full liquid diet for weeks 2 and 3 post-op with a goal of 60 gm of protein daily   ~begin bariatric vitamins on 1st day of post-op week 2  ~emphasized smooth, liquid consistency of intake. Any chunks or bits of food may cause complications of healing.   Reviewed the importance of  ~post op activity as tolerated  ~ incentive spirometry as provided by the respiratory therapist at the hospital   ~adequate oral fluid intake/hydration  ~written copy of ambulation, and hydration tracking tool provided  Reviewed post-op wound/incision care (written copy provided)   ~may shower 48 hours after surgery, no rubbing or scrubbing and pat to dry   ~no tub baths, swimming pool, hot tub until cleared at 2 week post-op visit   ~may remove outer bandage after 1st shower, steri-strips remain until they fall off  ~educated patient of signs and symptoms of infection and importance of   reporting them (redness, swelling, drainage, fever)  List of approved post-op over-the-counter meds provided   ~emphasized NO NSAIDS  Reviewed post-op constipation protocol, written copy  provided.    Patient verbalized complete understanding.

## 2023-04-27 NOTE — DISCHARGE INSTRUCTIONS
To confirm, Your doctor has instructed you that surgery is scheduled for:   5/1/23  Please report to [facility], Registration the morning of surgery. You must check-in and receive a wristband before going to your procedure.    Pre-Op will call the afternoon prior to surgery between 1:00 and 6:00 PM with the final arrival time.  Phone number: [phone]    PLEASE NOTE:  The surgery schedule has many variables which may affect the time of your surgery case.  Family members should be available if your surgery time changes.  Plan to be here the day of your procedure between 4-6 hours.    MEDICATIONS:  TAKE ONLY THESE MEDICATIONS WITH A SMALL SIP OF WATER THE MORNING OF YOUR PROCEDURE:    NONE        DO NOT TAKE THESE MEDICATIONS 5-7 DAYS PRIOR to your procedure or per your surgeon's request:   ASPIRIN, ALEVE, ADVIL, IBUPROFEN, FISH OIL VITAMIN E, HERBALS  (May take Tylenol)    ONLY if you are prescribed any types of blood thinners such as:  Aspirin, Coumadin, Plavix, Pradaxa, Xarelto, Aggrenox, Effient, Eliquis, Savasya, Brilinta, or any other, ask your surgeon whether you should stop taking them and how long before surgery you should stop.  You may also need to verify with the prescribing physician if it is ok to stop your medication.      INSTRUCTIONS IMPORTANT!!  Do not eat or drink anything between midnight and the time of your procedure- this includes gum, mints, and candy.  Do not smoke or drink alcoholic beverages 24 hours prior to your procedure.  Shower the night before AND the morning of your procedure with a Chlorhexidine wash such as Hibiclens or Dial antibacterial soap from the neck down.  Do not get it on your face or in your eyes.  You may use your own shampoo and face wash. This helps your skin to be as bacteria free as possible.    If you wear contact lenses, dentures, hearing aids or glasses, bring a container to put them in during surgery and give to a family member for safe  keeping.  Please leave all jewelry, piercing's and valuables at home. You must remove your false eyelashes prior to surgery.    DO NOT remove hair from the surgery site.  Do not shave the incision site unless you are given specific instructions to do so.    ONLY if you have been diagnosed with sleep apnea please bring your C-PAP machine.  ONLY if you wear home oxygen please bring your portable oxygen tank the day of your procedure.  ONLY if you have a history of OPEN HEART SURGERY you will need a clearance from your Cardiologist per Anesthesia.      ONLY for patients requiring bowel prep, written instructions will be given by your doctor's office.  ONLY if you have a neuro stimulator, please bring the controller with you the morning of surgery  ONLY if a type and screen test is needed before surgery, please return:  If your doctor has scheduled you for an overnight stay, bring a small overnight bag with any personal items you need.  Make arrangements in advance for transportation home by a responsible adult.  It is not safe to drive a vehicle during the 24 hours after anesthesia.      [facility] Visitor Policy    Effective September 26, 2022    [facility] will resume routine visitation for COVID-19 negative patients, including inpatients, outpatients, and procedural areas, in accordance with local campus procedures.    All [facility] facilities and properties are tobacco free.  Smoking is NOT allowed.   If you have any questions about these instructions, call Pre-Op Admit  Nursing at [phone] or the Pre-Op Day Surgery Unit at [phone].

## 2023-04-28 ENCOUNTER — ANESTHESIA (OUTPATIENT)
Dept: ENDOSCOPY | Facility: HOSPITAL | Age: 48
DRG: 621 | End: 2023-04-28
Payer: COMMERCIAL

## 2023-04-28 ENCOUNTER — ANESTHESIA EVENT (OUTPATIENT)
Dept: SURGERY | Facility: HOSPITAL | Age: 48
DRG: 621 | End: 2023-04-28
Payer: COMMERCIAL

## 2023-04-28 ENCOUNTER — ANESTHESIA EVENT (OUTPATIENT)
Dept: ENDOSCOPY | Facility: HOSPITAL | Age: 48
DRG: 621 | End: 2023-04-28
Payer: COMMERCIAL

## 2023-04-28 PROCEDURE — D9220A PRA ANESTHESIA: ICD-10-PCS | Mod: ANES,,, | Performed by: ANESTHESIOLOGY

## 2023-04-28 PROCEDURE — D9220A PRA ANESTHESIA: Mod: ANES,,, | Performed by: ANESTHESIOLOGY

## 2023-04-28 PROCEDURE — D9220A PRA ANESTHESIA: ICD-10-PCS | Mod: CRNA,,, | Performed by: NURSE ANESTHETIST, CERTIFIED REGISTERED

## 2023-04-28 PROCEDURE — 63600175 PHARM REV CODE 636 W HCPCS: Performed by: NURSE ANESTHETIST, CERTIFIED REGISTERED

## 2023-04-28 PROCEDURE — D9220A PRA ANESTHESIA: Mod: CRNA,,, | Performed by: NURSE ANESTHETIST, CERTIFIED REGISTERED

## 2023-04-28 PROCEDURE — 25000003 PHARM REV CODE 250: Performed by: NURSE ANESTHETIST, CERTIFIED REGISTERED

## 2023-04-28 RX ORDER — LIDOCAINE HYDROCHLORIDE 20 MG/ML
INJECTION INTRAVENOUS
Status: DISCONTINUED | OUTPATIENT
Start: 2023-04-28 | End: 2023-04-28

## 2023-04-28 RX ORDER — PROPOFOL 10 MG/ML
VIAL (ML) INTRAVENOUS
Status: DISCONTINUED | OUTPATIENT
Start: 2023-04-28 | End: 2023-04-28

## 2023-04-28 RX ADMIN — PROPOFOL 50 MG: 10 INJECTION, EMULSION INTRAVENOUS at 09:04

## 2023-04-28 RX ADMIN — PROPOFOL 100 MG: 10 INJECTION, EMULSION INTRAVENOUS at 09:04

## 2023-04-28 RX ADMIN — GLYCOPYRROLATE 0.2 MG: 0.2 INJECTION, SOLUTION INTRAMUSCULAR; INTRAVITREAL at 09:04

## 2023-04-28 RX ADMIN — LIDOCAINE HYDROCHLORIDE 100 MG: 20 INJECTION, SOLUTION INTRAVENOUS at 09:04

## 2023-04-28 NOTE — TRANSFER OF CARE
Anesthesia Transfer of Care Note    Patient: Nano White    Procedure(s) Performed: Procedure(s) (LRB):  COLONOSCOPY (N/A)    Patient location: PACU    Anesthesia Type: general    Transport from OR: Transported from OR on 2-3 L/min O2 by NC with adequate spontaneous ventilation    Post pain: adequate analgesia    Post assessment: no apparent anesthetic complications and tolerated procedure well    Post vital signs: stable    Level of consciousness: sedated and responds to stimulation    Nausea/Vomiting: no nausea/vomiting    Complications: none    Transfer of care protocol was followed      Last vitals:   Visit Vitals  /89 (BP Location: Left arm, Patient Position: Lying)   Pulse 77   Temp 36.8 °C (98.2 °F) (Skin)   Resp 18   Wt 101.2 kg (223 lb)   LMP 03/14/2023 (Approximate)   Breastfeeding No   BMI 36.54 kg/m²

## 2023-04-28 NOTE — ANESTHESIA POSTPROCEDURE EVALUATION
Anesthesia Post Evaluation    Patient: Nano White    Procedure(s) Performed: Procedure(s) (LRB):  COLONOSCOPY (N/A)    Final Anesthesia Type: general      Patient location during evaluation: PACU  Patient participation: Yes- Able to Participate  Level of consciousness: awake and alert and oriented  Post-procedure vital signs: reviewed and stable  Pain management: adequate  Airway patency: patent    PONV status at discharge: No PONV  Anesthetic complications: no      Cardiovascular status: blood pressure returned to baseline and stable  Respiratory status: unassisted and spontaneous ventilation  Hydration status: euvolemic  Follow-up not needed.          Vitals Value Taken Time   /74 04/28/23 0935   Temp   04/28/23 1309   Pulse 70 04/28/23 0935   Resp 20 04/28/23 0935   SpO2 94 % 04/28/23 0935         Event Time   Out of Recovery 10:04:21         Pain/Suzanne Score: Suzanne Score: 10 (4/28/2023  9:45 AM)

## 2023-04-28 NOTE — ANESTHESIA PREPROCEDURE EVALUATION
04/28/2023  Nano White is a 47 y.o., female.      Pre-op Assessment    I have reviewed the Patient Summary Reports.     I have reviewed the Nursing Notes. I have reviewed the NPO Status.   I have reviewed the Medications.     Review of Systems  Anesthesia Hx:  No problems with previous Anesthesia    Social:  Former Smoker    Cardiovascular:  Cardiovascular Normal     Pulmonary:   Sleep Apnea    Renal/:  Renal/ Normal     Neurological:  Neurology Normal    Endocrine:  Endocrine Normal  Obesity / BMI > 30      Physical Exam  General: Well nourished, Cooperative, Alert and Oriented    Airway:  Mallampati: II   Mouth Opening: Normal  TM Distance: Normal  Neck ROM: Normal ROM        Anesthesia Plan  Type of Anesthesia, risks & benefits discussed:    Anesthesia Type: Gen ETT, Gen Supraglottic Airway, Gen Natural Airway, MAC  Intra-op Monitoring Plan: Standard ASA Monitors  Post Op Pain Control Plan: multimodal analgesia  Induction:  IV  Airway Plan: Direct, Video and Fiberoptic, Post-Induction  Informed Consent: Informed consent signed with the Patient and all parties understand the risks and agree with anesthesia plan.  All questions answered.   ASA Score: 2    Ready For Surgery From Anesthesia Perspective.     .

## 2023-05-01 ENCOUNTER — ANESTHESIA (OUTPATIENT)
Dept: SURGERY | Facility: HOSPITAL | Age: 48
DRG: 621 | End: 2023-05-01
Payer: COMMERCIAL

## 2023-05-01 ENCOUNTER — HOSPITAL ENCOUNTER (INPATIENT)
Facility: HOSPITAL | Age: 48
LOS: 1 days | Discharge: HOME | DRG: 621 | End: 2023-05-02
Attending: SURGERY | Admitting: SURGERY
Payer: COMMERCIAL

## 2023-05-01 DIAGNOSIS — G47.33 OSA (OBSTRUCTIVE SLEEP APNEA): ICD-10-CM

## 2023-05-01 DIAGNOSIS — E78.00 HIGH CHOLESTEROL: ICD-10-CM

## 2023-05-01 DIAGNOSIS — E66.01 MORBID OBESITY: ICD-10-CM

## 2023-05-01 PROBLEM — E78.5 DYSLIPIDEMIA: Status: ACTIVE | Noted: 2023-05-01

## 2023-05-01 LAB
B-HCG UR QL: NEGATIVE
CTP QC/QA: YES

## 2023-05-01 PROCEDURE — 25000003 PHARM REV CODE 250: Performed by: ANESTHESIOLOGY

## 2023-05-01 PROCEDURE — 36000713 HC OR TIME LEV V EA ADD 15 MIN: Performed by: SURGERY

## 2023-05-01 PROCEDURE — 63600175 PHARM REV CODE 636 W HCPCS: Performed by: ANESTHESIOLOGY

## 2023-05-01 PROCEDURE — 63600175 PHARM REV CODE 636 W HCPCS: Performed by: SURGERY

## 2023-05-01 PROCEDURE — C9113 INJ PANTOPRAZOLE SODIUM, VIA: HCPCS | Performed by: NURSE PRACTITIONER

## 2023-05-01 PROCEDURE — 63600175 PHARM REV CODE 636 W HCPCS: Performed by: NURSE PRACTITIONER

## 2023-05-01 PROCEDURE — 63600175 PHARM REV CODE 636 W HCPCS: Performed by: NURSE ANESTHETIST, CERTIFIED REGISTERED

## 2023-05-01 PROCEDURE — 37000009 HC ANESTHESIA EA ADD 15 MINS: Performed by: SURGERY

## 2023-05-01 PROCEDURE — 37000008 HC ANESTHESIA 1ST 15 MINUTES: Performed by: SURGERY

## 2023-05-01 PROCEDURE — 94761 N-INVAS EAR/PLS OXIMETRY MLT: CPT

## 2023-05-01 PROCEDURE — 99900103 DSU ONLY-NO CHARGE-INITIAL HR (STAT): Performed by: SURGERY

## 2023-05-01 PROCEDURE — 94799 UNLISTED PULMONARY SVC/PX: CPT

## 2023-05-01 PROCEDURE — 25000003 PHARM REV CODE 250: Performed by: SURGERY

## 2023-05-01 PROCEDURE — 36000712 HC OR TIME LEV V 1ST 15 MIN: Performed by: SURGERY

## 2023-05-01 PROCEDURE — 99900035 HC TECH TIME PER 15 MIN (STAT)

## 2023-05-01 PROCEDURE — 71000039 HC RECOVERY, EACH ADD'L HOUR: Performed by: SURGERY

## 2023-05-01 PROCEDURE — D9220A PRA ANESTHESIA: ICD-10-PCS | Mod: ANES,,, | Performed by: ANESTHESIOLOGY

## 2023-05-01 PROCEDURE — D9220A PRA ANESTHESIA: Mod: ANES,,, | Performed by: ANESTHESIOLOGY

## 2023-05-01 PROCEDURE — 12000002 HC ACUTE/MED SURGE SEMI-PRIVATE ROOM

## 2023-05-01 PROCEDURE — 71000033 HC RECOVERY, INTIAL HOUR: Performed by: SURGERY

## 2023-05-01 PROCEDURE — 94760 N-INVAS EAR/PLS OXIMETRY 1: CPT

## 2023-05-01 PROCEDURE — 43644 PR LAP GASTRIC BYPASS/ROUX-EN-Y: ICD-10-PCS | Mod: ,,, | Performed by: SURGERY

## 2023-05-01 PROCEDURE — 27201423 OPTIME MED/SURG SUP & DEVICES STERILE SUPPLY: Performed by: SURGERY

## 2023-05-01 PROCEDURE — D9220A PRA ANESTHESIA: ICD-10-PCS | Mod: CRNA,,, | Performed by: NURSE ANESTHETIST, CERTIFIED REGISTERED

## 2023-05-01 PROCEDURE — 81025 URINE PREGNANCY TEST: CPT | Performed by: ANESTHESIOLOGY

## 2023-05-01 PROCEDURE — 25000003 PHARM REV CODE 250: Performed by: NURSE ANESTHETIST, CERTIFIED REGISTERED

## 2023-05-01 PROCEDURE — 43644 LAP GASTRIC BYPASS/ROUX-EN-Y: CPT | Mod: ,,, | Performed by: SURGERY

## 2023-05-01 PROCEDURE — D9220A PRA ANESTHESIA: Mod: CRNA,,, | Performed by: NURSE ANESTHETIST, CERTIFIED REGISTERED

## 2023-05-01 PROCEDURE — 99900104 DSU ONLY-NO CHARGE-EA ADD'L HR (STAT): Performed by: SURGERY

## 2023-05-01 RX ORDER — ACETAMINOPHEN 10 MG/ML
INJECTION, SOLUTION INTRAVENOUS
Status: DISCONTINUED | OUTPATIENT
Start: 2023-05-01 | End: 2023-05-01

## 2023-05-01 RX ORDER — SCOLOPAMINE TRANSDERMAL SYSTEM 1 MG/1
1 PATCH, EXTENDED RELEASE TRANSDERMAL
Status: DISCONTINUED | OUTPATIENT
Start: 2023-05-01 | End: 2023-05-02 | Stop reason: HOSPADM

## 2023-05-01 RX ORDER — SODIUM CHLORIDE, SODIUM LACTATE, POTASSIUM CHLORIDE, CALCIUM CHLORIDE 600; 310; 30; 20 MG/100ML; MG/100ML; MG/100ML; MG/100ML
INJECTION, SOLUTION INTRAVENOUS CONTINUOUS
Status: DISCONTINUED | OUTPATIENT
Start: 2023-05-01 | End: 2023-05-02 | Stop reason: HOSPADM

## 2023-05-01 RX ORDER — FAMOTIDINE 10 MG/ML
20 INJECTION INTRAVENOUS EVERY 12 HOURS
Status: DISCONTINUED | OUTPATIENT
Start: 2023-05-01 | End: 2023-05-02 | Stop reason: HOSPADM

## 2023-05-01 RX ORDER — MIDAZOLAM HYDROCHLORIDE 1 MG/ML
INJECTION INTRAMUSCULAR; INTRAVENOUS
Status: DISCONTINUED | OUTPATIENT
Start: 2023-05-01 | End: 2023-05-01

## 2023-05-01 RX ORDER — PROMETHAZINE HYDROCHLORIDE 25 MG/ML
INJECTION, SOLUTION INTRAMUSCULAR; INTRAVENOUS
Status: DISCONTINUED | OUTPATIENT
Start: 2023-05-01 | End: 2023-05-01

## 2023-05-01 RX ORDER — SODIUM CHLORIDE 9 MG/ML
INJECTION, SOLUTION INTRAVENOUS CONTINUOUS
Status: DISCONTINUED | OUTPATIENT
Start: 2023-05-01 | End: 2023-05-01

## 2023-05-01 RX ORDER — PANTOPRAZOLE SODIUM 40 MG/10ML
40 INJECTION, POWDER, LYOPHILIZED, FOR SOLUTION INTRAVENOUS DAILY
Status: DISCONTINUED | OUTPATIENT
Start: 2023-05-01 | End: 2023-05-01 | Stop reason: HOSPADM

## 2023-05-01 RX ORDER — ROCURONIUM BROMIDE 10 MG/ML
INJECTION, SOLUTION INTRAVENOUS
Status: DISCONTINUED | OUTPATIENT
Start: 2023-05-01 | End: 2023-05-01

## 2023-05-01 RX ORDER — CEFAZOLIN SODIUM 2 G/50ML
2 SOLUTION INTRAVENOUS
Status: COMPLETED | OUTPATIENT
Start: 2023-05-01 | End: 2023-05-02

## 2023-05-01 RX ORDER — MUPIROCIN 20 MG/G
OINTMENT TOPICAL 2 TIMES DAILY
Status: DISCONTINUED | OUTPATIENT
Start: 2023-05-01 | End: 2023-05-02 | Stop reason: HOSPADM

## 2023-05-01 RX ORDER — KETOROLAC TROMETHAMINE 30 MG/ML
INJECTION, SOLUTION INTRAMUSCULAR; INTRAVENOUS
Status: DISCONTINUED | OUTPATIENT
Start: 2023-05-01 | End: 2023-05-01

## 2023-05-01 RX ORDER — FENTANYL CITRATE 50 UG/ML
25 INJECTION, SOLUTION INTRAMUSCULAR; INTRAVENOUS EVERY 5 MIN PRN
Status: DISCONTINUED | OUTPATIENT
Start: 2023-05-01 | End: 2023-05-01 | Stop reason: HOSPADM

## 2023-05-01 RX ORDER — HEPARIN SODIUM 5000 [USP'U]/ML
5000 INJECTION, SOLUTION INTRAVENOUS; SUBCUTANEOUS EVERY 8 HOURS
Status: DISCONTINUED | OUTPATIENT
Start: 2023-05-01 | End: 2023-05-01 | Stop reason: HOSPADM

## 2023-05-01 RX ORDER — PANTOPRAZOLE SODIUM 40 MG/1
40 TABLET, DELAYED RELEASE ORAL DAILY
Status: DISCONTINUED | OUTPATIENT
Start: 2023-05-02 | End: 2023-05-02 | Stop reason: HOSPADM

## 2023-05-01 RX ORDER — FENTANYL CITRATE 50 UG/ML
INJECTION, SOLUTION INTRAMUSCULAR; INTRAVENOUS
Status: DISCONTINUED | OUTPATIENT
Start: 2023-05-01 | End: 2023-05-01

## 2023-05-01 RX ORDER — SUCCINYLCHOLINE CHLORIDE 20 MG/ML
INJECTION INTRAMUSCULAR; INTRAVENOUS
Status: DISCONTINUED | OUTPATIENT
Start: 2023-05-01 | End: 2023-05-01

## 2023-05-01 RX ORDER — BUPIVACAINE HYDROCHLORIDE AND EPINEPHRINE 5; 5 MG/ML; UG/ML
INJECTION, SOLUTION EPIDURAL; INTRACAUDAL; PERINEURAL
Status: DISCONTINUED | OUTPATIENT
Start: 2023-05-01 | End: 2023-05-01 | Stop reason: HOSPADM

## 2023-05-01 RX ORDER — CEFAZOLIN SODIUM 2 G/50ML
2 SOLUTION INTRAVENOUS
Status: COMPLETED | OUTPATIENT
Start: 2023-05-01 | End: 2023-05-01

## 2023-05-01 RX ORDER — ONDANSETRON 2 MG/ML
8 INJECTION INTRAMUSCULAR; INTRAVENOUS EVERY 6 HOURS PRN
Status: DISCONTINUED | OUTPATIENT
Start: 2023-05-01 | End: 2023-05-02 | Stop reason: HOSPADM

## 2023-05-01 RX ORDER — DEXAMETHASONE SODIUM PHOSPHATE 4 MG/ML
INJECTION, SOLUTION INTRA-ARTICULAR; INTRALESIONAL; INTRAMUSCULAR; INTRAVENOUS; SOFT TISSUE
Status: DISCONTINUED | OUTPATIENT
Start: 2023-05-01 | End: 2023-05-01

## 2023-05-01 RX ORDER — ONDANSETRON 2 MG/ML
INJECTION INTRAMUSCULAR; INTRAVENOUS
Status: DISCONTINUED | OUTPATIENT
Start: 2023-05-01 | End: 2023-05-01

## 2023-05-01 RX ORDER — PROPOFOL 10 MG/ML
VIAL (ML) INTRAVENOUS
Status: DISCONTINUED | OUTPATIENT
Start: 2023-05-01 | End: 2023-05-01

## 2023-05-01 RX ORDER — LIDOCAINE HYDROCHLORIDE 10 MG/ML
1 INJECTION, SOLUTION EPIDURAL; INFILTRATION; INTRACAUDAL; PERINEURAL ONCE
Status: DISCONTINUED | OUTPATIENT
Start: 2023-05-01 | End: 2023-05-01 | Stop reason: HOSPADM

## 2023-05-01 RX ORDER — OXYCODONE HYDROCHLORIDE 10 MG/1
10 TABLET ORAL EVERY 4 HOURS PRN
Status: DISCONTINUED | OUTPATIENT
Start: 2023-05-01 | End: 2023-05-02 | Stop reason: HOSPADM

## 2023-05-01 RX ORDER — METOCLOPRAMIDE HYDROCHLORIDE 5 MG/ML
10 INJECTION INTRAMUSCULAR; INTRAVENOUS EVERY 10 MIN PRN
Status: COMPLETED | OUTPATIENT
Start: 2023-05-01 | End: 2023-05-01

## 2023-05-01 RX ORDER — HYDROMORPHONE HYDROCHLORIDE 1 MG/ML
1 INJECTION, SOLUTION INTRAMUSCULAR; INTRAVENOUS; SUBCUTANEOUS EVERY 6 HOURS PRN
Status: DISCONTINUED | OUTPATIENT
Start: 2023-05-01 | End: 2023-05-02 | Stop reason: HOSPADM

## 2023-05-01 RX ORDER — SODIUM CHLORIDE, SODIUM LACTATE, POTASSIUM CHLORIDE, CALCIUM CHLORIDE 600; 310; 30; 20 MG/100ML; MG/100ML; MG/100ML; MG/100ML
INJECTION, SOLUTION INTRAVENOUS CONTINUOUS
Status: DISCONTINUED | OUTPATIENT
Start: 2023-05-01 | End: 2023-05-01

## 2023-05-01 RX ORDER — OXYCODONE HYDROCHLORIDE 5 MG/1
5 TABLET ORAL ONCE AS NEEDED
Status: DISCONTINUED | OUTPATIENT
Start: 2023-05-01 | End: 2023-05-01 | Stop reason: HOSPADM

## 2023-05-01 RX ORDER — LIDOCAINE HYDROCHLORIDE 20 MG/ML
INJECTION INTRAVENOUS
Status: DISCONTINUED | OUTPATIENT
Start: 2023-05-01 | End: 2023-05-01

## 2023-05-01 RX ORDER — ENOXAPARIN SODIUM 100 MG/ML
40 INJECTION SUBCUTANEOUS EVERY 24 HOURS
Status: DISCONTINUED | OUTPATIENT
Start: 2023-05-02 | End: 2023-05-02 | Stop reason: HOSPADM

## 2023-05-01 RX ADMIN — SODIUM CHLORIDE, POTASSIUM CHLORIDE, SODIUM LACTATE AND CALCIUM CHLORIDE: 600; 310; 30; 20 INJECTION, SOLUTION INTRAVENOUS at 04:05

## 2023-05-01 RX ADMIN — HEPARIN SODIUM 5000 UNITS: 5000 INJECTION, SOLUTION INTRAVENOUS; SUBCUTANEOUS at 08:05

## 2023-05-01 RX ADMIN — DEXAMETHASONE SODIUM PHOSPHATE 4 MG: 4 INJECTION, SOLUTION INTRA-ARTICULAR; INTRALESIONAL; INTRAMUSCULAR; INTRAVENOUS; SOFT TISSUE at 10:05

## 2023-05-01 RX ADMIN — MIDAZOLAM HYDROCHLORIDE 2 MG: 1 INJECTION INTRAMUSCULAR; INTRAVENOUS at 10:05

## 2023-05-01 RX ADMIN — PANTOPRAZOLE SODIUM 40 MG: 40 INJECTION, POWDER, FOR SOLUTION INTRAVENOUS at 08:05

## 2023-05-01 RX ADMIN — FENTANYL CITRATE 100 MCG: 50 INJECTION, SOLUTION INTRAMUSCULAR; INTRAVENOUS at 10:05

## 2023-05-01 RX ADMIN — SCOPALAMINE 1 PATCH: 1 PATCH, EXTENDED RELEASE TRANSDERMAL at 08:05

## 2023-05-01 RX ADMIN — ACETAMINOPHEN 1000 MG: 10 INJECTION, SOLUTION INTRAVENOUS at 10:05

## 2023-05-01 RX ADMIN — LIDOCAINE HYDROCHLORIDE 100 MG: 20 INJECTION, SOLUTION INTRAVENOUS at 10:05

## 2023-05-01 RX ADMIN — ONDANSETRON 4 MG: 2 INJECTION INTRAMUSCULAR; INTRAVENOUS at 02:05

## 2023-05-01 RX ADMIN — FENTANYL CITRATE 50 MCG: 50 INJECTION, SOLUTION INTRAMUSCULAR; INTRAVENOUS at 03:05

## 2023-05-01 RX ADMIN — SUGAMMADEX 200 MG: 100 INJECTION, SOLUTION INTRAVENOUS at 02:05

## 2023-05-01 RX ADMIN — SODIUM CHLORIDE, SODIUM GLUCONATE, SODIUM ACETATE, POTASSIUM CHLORIDE AND MAGNESIUM CHLORIDE: 526; 502; 368; 37; 30 INJECTION, SOLUTION INTRAVENOUS at 08:05

## 2023-05-01 RX ADMIN — METOCLOPRAMIDE 10 MG: 5 INJECTION, SOLUTION INTRAMUSCULAR; INTRAVENOUS at 04:05

## 2023-05-01 RX ADMIN — CEFAZOLIN SODIUM 2 G: 2 SOLUTION INTRAVENOUS at 07:05

## 2023-05-01 RX ADMIN — FENTANYL CITRATE 50 MCG: 50 INJECTION, SOLUTION INTRAMUSCULAR; INTRAVENOUS at 12:05

## 2023-05-01 RX ADMIN — PROPOFOL 200 MG: 10 INJECTION, EMULSION INTRAVENOUS at 10:05

## 2023-05-01 RX ADMIN — FAMOTIDINE 20 MG: 10 INJECTION, SOLUTION INTRAVENOUS at 09:05

## 2023-05-01 RX ADMIN — ROCURONIUM BROMIDE 45 MG: 10 INJECTION, SOLUTION INTRAVENOUS at 10:05

## 2023-05-01 RX ADMIN — CEFAZOLIN SODIUM 2 G: 2 SOLUTION INTRAVENOUS at 10:05

## 2023-05-01 RX ADMIN — CEFAZOLIN SODIUM 2 G: 2 SOLUTION INTRAVENOUS at 02:05

## 2023-05-01 RX ADMIN — ROCURONIUM BROMIDE 5 MG: 10 INJECTION, SOLUTION INTRAVENOUS at 10:05

## 2023-05-01 RX ADMIN — KETOROLAC TROMETHAMINE 30 MG: 30 INJECTION, SOLUTION INTRAMUSCULAR; INTRAVENOUS at 02:05

## 2023-05-01 RX ADMIN — ONDANSETRON 4 MG: 2 INJECTION INTRAMUSCULAR; INTRAVENOUS at 10:05

## 2023-05-01 RX ADMIN — ROCURONIUM BROMIDE 20 MG: 10 INJECTION, SOLUTION INTRAVENOUS at 01:05

## 2023-05-01 RX ADMIN — PROMETHAZINE HYDROCHLORIDE 12.5 MG: 25 INJECTION INTRAMUSCULAR; INTRAVENOUS at 10:05

## 2023-05-01 RX ADMIN — ROCURONIUM BROMIDE 30 MG: 10 INJECTION, SOLUTION INTRAVENOUS at 11:05

## 2023-05-01 RX ADMIN — FENTANYL CITRATE 50 MCG: 50 INJECTION, SOLUTION INTRAMUSCULAR; INTRAVENOUS at 02:05

## 2023-05-01 RX ADMIN — FENTANYL CITRATE 50 MCG: 50 INJECTION, SOLUTION INTRAMUSCULAR; INTRAVENOUS at 10:05

## 2023-05-01 RX ADMIN — FIBRINOGEN HUMAN AND THROMBIN HUMAN: KIT at 10:05

## 2023-05-01 RX ADMIN — SUCCINYLCHOLINE CHLORIDE 140 MG: 20 INJECTION, SOLUTION INTRAMUSCULAR; INTRAVENOUS at 10:05

## 2023-05-01 RX ADMIN — MUPIROCIN: 20 OINTMENT TOPICAL at 08:05

## 2023-05-01 NOTE — ANESTHESIA POSTPROCEDURE EVALUATION
Anesthesia Post Evaluation    Patient: Nano White    Procedure(s) Performed: Procedure(s) (LRB):  XI ROBOTIC GASTROENTEROSTOMY, KENNETH-EN-Y (N/A)    Final Anesthesia Type: general      Patient location during evaluation: PACU  Patient participation: Yes- Able to Participate  Level of consciousness: awake and alert  Post-procedure vital signs: reviewed and stable  Pain management: adequate  Airway patency: patent    PONV status at discharge: No PONV  Anesthetic complications: no      Cardiovascular status: hemodynamically stable  Respiratory status: unassisted and room air  Hydration status: euvolemic  Follow-up not needed.          Vitals Value Taken Time   /87 05/01/23 1647   Temp 36.9 °C (98.4 °F) 05/01/23 1647   Pulse 74 05/01/23 1715   Resp 16 05/01/23 1715   SpO2 97 % 05/01/23 1715         Event Time   Out of Recovery 16:20:00         Pain/Suzanne Score: Suzanne Score: 10 (5/1/2023  4:15 PM)

## 2023-05-01 NOTE — PLAN OF CARE
Pt prepped for surgery. Consents at bedside. Incentive spirometry taught by respiratory. Pt belongings given to pt's .  set up for text alerts.

## 2023-05-01 NOTE — ANESTHESIA PROCEDURE NOTES
Intubation    Date/Time: 5/1/2023 10:16 AM  Performed by: Liana Tejeda CRNA  Authorized by: Liana Tejeda CRNA     Intubation:     Induction:  Intravenous    Intubated:  Postinduction    Mask Ventilation:  Easy with oral airway    Attempts:  1    Attempted By:  CRNA    Method of Intubation:  Video laryngoscopy    Blade:  Bello 3    Laryngeal View Grade: Grade I - full view of cords      Difficult Airway Encountered?: No      Complications:  None    Airway Device:  Oral endotracheal tube    Airway Device Size:  7.5    Inflation Amount (mL):  5    Tube secured:  21    Secured at:  The lips    Placement Verified By:  Capnometry and Revisualization with laryngoscopy    Complicating Factors:  Obesity, oropharyngeal edema or fat and short neck    Findings Post-Intubation:  BS equal bilateral and atraumatic/condition of teeth unchanged

## 2023-05-01 NOTE — H&P
Follow up     SUBJECTIVE:          Chief Complaint   Patient presents with    Obesity         History of Present Illness:     Patient is a 47 y.o. female who is referred for evaluation of surgical treatment of morbid obesity. Her Body mass index is 37.17 kg/m². She has known comorbidities of dyslipidemia and obstructive sleep apnea. She has not attended the bariatric seminar and is most interested in gastric bypass  surgery.           Review of patient's allergies indicates:   Allergen Reactions    Chocolate low lactose Hives       Hives and throat swelling with Chocolate    Benadryl [diphenhydramine hcl] Hives    Phentermine Hives and Itching    Codeine Hives and Nausea And Vomiting       HIVES         Current Medications          Current Outpatient Medications   Medication Sig Dispense Refill    multivit-min/iron/folic acid/K (BARIATRIC MULTIVITAMINS ORAL) Take 1 capsule by mouth Daily.        phentermine 15 MG capsule TAKE 1 CAPSULE(15 MG) BY MOUTH EVERY MORNING (Patient not taking: Reported on 4/4/2023) 30 capsule 0      No current facility-administered medications for this visit.                 Past Medical History:   Diagnosis Date    H/O gastric banding 2014    History of removal of laparoscopic gastric banding device 09/12/2022    Obstructive sleep apnea      S/P gastric surgery 03/09/2016            Past Surgical History:   Procedure Laterality Date    BREAST REDUCTION        BREAST SURGERY        KNEE SURGERY        LAPAROSCOPIC GASTRIC BANDING        LAPAROSCOPIC REMOVAL OF GASTRIC BAND N/A 8/19/2022     Procedure: REMOVAL, GASTRIC BAND, LAPAROSCOPIC;  Surgeon: Paresh Sampson MD;  Location: [facility];  Service: General;  Laterality: N/A;            Family History   Problem Relation Age of Onset    Heart disease Mother      Diabetes Mother      Obesity Mother      Diabetes Maternal Aunt      Diabetes Maternal Uncle      Diabetes Paternal Aunt      Diabetes Paternal Uncle      Cancer Maternal Grandmother  "     Cancer Maternal Grandfather      Cancer Paternal Grandmother      Cancer Paternal Grandfather      Obesity Sister      Obesity Sister        Social History            Tobacco Use    Smoking status: Former       Packs/day: 0.50       Types: Cigarettes       Quit date: 10/18/2022       Years since quittin.4    Smokeless tobacco: Never   Substance Use Topics    Alcohol use: Yes       Comment: Socially    Drug use: Never         Review of Systems   Constitutional:  Positive for unexpected weight change. Negative for chills, diaphoresis and fever.   HENT:  Negative for congestion, sinus pressure, sneezing, sore throat, tinnitus and voice change.    Eyes:  Negative for pain, redness and itching.   Respiratory:  Negative for apnea, cough, choking, chest tightness, shortness of breath, wheezing and stridor.    Cardiovascular:  Negative for chest pain, palpitations and leg swelling.   Gastrointestinal:  Negative for abdominal pain, anal bleeding, constipation, diarrhea, nausea and vomiting.   Endocrine: Negative for cold intolerance and heat intolerance.   Genitourinary:  Negative for difficulty urinating and dysuria.   Musculoskeletal:  Positive for back pain. Negative for arthralgias and gait problem.   Skin:  Negative for rash and wound.   Allergic/Immunologic: Negative for environmental allergies and food allergies.   Neurological:  Negative for dizziness, light-headedness and headaches.   Hematological:  Negative for adenopathy. Does not bruise/bleed easily.   Psychiatric/Behavioral:  Negative for agitation and confusion.    All other systems reviewed and are negative.     OBJECTIVE:      Vital Signs (Most Recent)  Temp: 98.6 °F (37 °C) (23 1039)  Pulse: (!) 58 (23 103)  Resp: 16 (23 103)  BP: (!) 137/90 (23 1039)  5' 5.5" (1.664 m)  102.9 kg (226 lb 12.8 oz)      Body comp:  Fat Percent:  48.7 %  Fat Mass:  110.4 lb  FFM:  116.4 lb  TBW: 84.6 lb  TBW %:  37.3 %  BMR: 1660 kcal         "   Physical Exam:  Physical Exam  Vitals and nursing note reviewed.   Constitutional:       General: She is not in acute distress.     Appearance: Normal appearance. She is obese. She is not ill-appearing or toxic-appearing.   HENT:      Head: Normocephalic and atraumatic.      Right Ear: External ear normal.      Left Ear: External ear normal.      Nose: Nose normal. No congestion or rhinorrhea.      Mouth/Throat:      Mouth: Mucous membranes are moist.      Pharynx: Oropharynx is clear.   Eyes:      General:         Right eye: No discharge.         Left eye: No discharge.      Conjunctiva/sclera: Conjunctivae normal.   Cardiovascular:      Rate and Rhythm: Normal rate and regular rhythm.      Pulses: Normal pulses.      Heart sounds: Normal heart sounds. No murmur heard.  Pulmonary:      Effort: Pulmonary effort is normal. No respiratory distress.      Breath sounds: Normal breath sounds. No stridor. No wheezing.   Chest:      Chest wall: No tenderness.   Abdominal:      General: Bowel sounds are normal. There is no distension.      Palpations: There is no mass.      Tenderness: There is no abdominal tenderness. There is no guarding.      Hernia: No hernia is present.   Musculoskeletal:         General: No swelling, tenderness, deformity or signs of injury. Normal range of motion.      Right lower leg: No edema.      Left lower leg: No edema.   Skin:     General: Skin is warm and dry.      Capillary Refill: Capillary refill takes less than 2 seconds.      Coloration: Skin is not jaundiced or pale.      Findings: No bruising, erythema or rash.   Neurological:      General: No focal deficit present.      Mental Status: She is alert and oriented to person, place, and time.      Motor: No weakness.      Gait: Gait normal.   Psychiatric:         Mood and Affect: Mood normal.         Behavior: Behavior normal.         Thought Content: Thought content normal.         Judgment: Judgment normal.         ASSESSMENT/PLAN:       Plan            EKG:  done     Psych: done  Dietician: done        1. Morbid obesity          2. BMI 36.0-36.9,adult          3. High cholesterol          4. ADELIA (obstructive sleep apnea)                Plan:  Nano White has morbid obesity as their Body mass index is 37.17 kg/m². She would benefit from weight loss surgery and has chosen gastric bypass surgery as the preferred procedure. She understands that this is a tool and lifestyle change will be necessary to keep weight off. I went over possible complications of the chosen surgery with the patient and she is agreeable to surgery.     She has been instructed to start the pre operative diet.     She surgical date is May 1     HX OF LAP BAND  Please note she has decided against the sleeve in favor of the gastric bypass. This is double confirmed day of surgery along with notable risks of procedure     The patient has been taught the post operative diet and understands that she will need to stay on this diet to prevent complication.  They are aware of the post operative follow up and return to see me after surgery to treat/prevent/identify any complications.  she has been advised to attend support groups post operatively.

## 2023-05-01 NOTE — TRANSFER OF CARE
"Anesthesia Transfer of Care Note    Patient: Nano White    Procedure(s) Performed: Procedure(s) (LRB):  XI ROBOTIC GASTROENTEROSTOMY, KENNETH-EN-Y (N/A)    Patient location: PACU    Anesthesia Type: general    Transport from OR: Transported from OR on 2-3 L/min O2 by NC with adequate spontaneous ventilation    Post pain: adequate analgesia    Post assessment: no apparent anesthetic complications and tolerated procedure well    Post vital signs: stable    Level of consciousness: sedated and responds to stimulation    Nausea/Vomiting: no nausea/vomiting    Complications: none    Transfer of care protocol was followed      Last vitals:   Visit Vitals  /76 (BP Location: Right arm, Patient Position: Lying)   Pulse (!) 54   Temp 36.7 °C (98 °F) (Oral)   Resp 16   Ht 5' 5.5" (1.664 m)   Wt 101.2 kg (223 lb)   LMP 03/14/2023 (Approximate)   SpO2 100%   Breastfeeding No   BMI 36.54 kg/m²     "

## 2023-05-01 NOTE — PLAN OF CARE
Problem: Adult Inpatient Plan of Care  Goal: Plan of Care Review  Outcome: Ongoing, Progressing  Goal: Patient-Specific Goal (Individualized)  Outcome: Ongoing, Progressing  Goal: Optimal Comfort and Wellbeing  Outcome: Ongoing, Progressing     Problem: Infection  Goal: Absence of Infection Signs and Symptoms  Outcome: Ongoing, Progressing   POC has been discussed with pt and significant other.  Pt on bariatric clear liquid diet.  Pt d/t void and she has of 100mls.  Pt hasn't c/o pain during shift.  Discussed with pt whether or not she is allergic to dilaudid and she is not.  1st day from surgery with Dr. Sampson.

## 2023-05-01 NOTE — SUBJECTIVE & OBJECTIVE
Past Medical History:   Diagnosis Date    H/O gastric banding 2014    History of removal of laparoscopic gastric banding device 2022    Obstructive sleep apnea     S/P gastric surgery 2016       Past Surgical History:   Procedure Laterality Date    BREAST REDUCTION      BREAST SURGERY      COLONOSCOPY N/A 2023    Procedure: COLONOSCOPY;  Surgeon: Sharon Cobos MD;  Location: [facility] ENDO;  Service: Endoscopy;  Laterality: N/A;  lm/ppm    KNEE SURGERY Bilateral     SCOPE    LAPAROSCOPIC GASTRIC BANDING      LAPAROSCOPIC REMOVAL OF GASTRIC BAND N/A 2022    Procedure: REMOVAL, GASTRIC BAND, LAPAROSCOPIC;  Surgeon: Paresh Sampson MD;  Location: [facility] OR;  Service: General;  Laterality: N/A;       Review of patient's allergies indicates:   Allergen Reactions    Chocolate low lactose Hives     Hives and throat swelling with Chocolate    Benadryl [diphenhydramine hcl] Hives    Phentermine Hives and Itching    Codeine Hives and Nausea And Vomiting     HIVES       No current facility-administered medications on file prior to encounter.     Current Outpatient Medications on File Prior to Encounter   Medication Sig    multivit-min/iron/folic acid/K (BARIATRIC MULTIVITAMINS ORAL) Take 1 capsule by mouth Daily.     Family History       Problem Relation (Age of Onset)    Cancer Maternal Grandmother, Maternal Grandfather, Paternal Grandmother, Paternal Grandfather    Diabetes Mother, Maternal Aunt, Maternal Uncle, Paternal Aunt, Paternal Uncle    Heart disease Mother    Obesity Mother, Sister, Sister          Tobacco Use    Smoking status: Former     Packs/day: 0.50     Types: Cigarettes     Quit date: 10/18/2022     Years since quittin.5    Smokeless tobacco: Never   Substance and Sexual Activity    Alcohol use: Yes     Comment: Socially    Drug use: Never    Sexual activity: Yes     Partners: Male     Review of Systems   Constitutional:  Negative for chills and fever.   HENT:  Negative for  congestion and rhinorrhea.    Respiratory:  Negative for cough, shortness of breath and wheezing.    Cardiovascular:  Negative for chest pain, palpitations and leg swelling.   Gastrointestinal:  Positive for abdominal pain and nausea. Negative for abdominal distention and vomiting.   Endocrine: Negative for cold intolerance and heat intolerance.   Genitourinary:  Negative for dysuria and urgency.   Musculoskeletal:  Negative for arthralgias and neck stiffness.   Skin:  Negative for rash and wound.   Neurological:  Negative for dizziness and weakness.   Objective:     Vital Signs (Most Recent):  Temp: 98.4 °F (36.9 °C) (05/01/23 1647)  Pulse: 81 (05/01/23 1647)  Resp: 12 (05/01/23 1647)  BP: 131/87 (05/01/23 1647)  SpO2: 97 % (05/01/23 1647) Vital Signs (24h Range):  Temp:  [97.9 °F (36.6 °C)-98.4 °F (36.9 °C)] 98.4 °F (36.9 °C)  Pulse:  [54-94] 81  Resp:  [12-19] 12  SpO2:  [97 %-100 %] 97 %  BP: (106-132)/(62-87) 131/87     Weight: 101.2 kg (223 lb)  Body mass index is 36.54 kg/m².    Physical Exam  Constitutional:       General: She is not in acute distress.     Appearance: She is well-developed.   HENT:      Head: Normocephalic and atraumatic.   Eyes:      Pupils: Pupils are equal, round, and reactive to light.   Cardiovascular:      Rate and Rhythm: Normal rate and regular rhythm.      Heart sounds: No murmur heard.  Pulmonary:      Effort: Pulmonary effort is normal. No respiratory distress.      Breath sounds: Normal breath sounds. No wheezing or rales.   Abdominal:      General: Bowel sounds are normal. There is no distension.      Palpations: Abdomen is soft.      Tenderness: There is abdominal tenderness.      Comments: Bandages clean/dry/intact   Musculoskeletal:         General: Normal range of motion.   Skin:     General: Skin is warm and dry.      Findings: No rash.   Neurological:      Mental Status: She is alert and oriented to person, place, and time.      Cranial Nerves: No cranial nerve deficit.    Psychiatric:         Behavior: Behavior normal.         CRANIAL NERVES     CN III, IV, VI   Pupils are equal, round, and reactive to light.     Significant Labs: All pertinent labs within the past 24 hours have been reviewed.    Significant Imaging: I have reviewed all pertinent imaging results/findings within the past 24 hours.

## 2023-05-01 NOTE — H&P
[facility] Medicine  History & Physical    Patient Name: Nano White  MRN: [id number]  Patient Class: IP- Inpatient  Admission Date: 5/1/2023  Attending Physician: Jennifer Meek MD  Primary Care Provider: IBAN Munoz         Patient information was obtained from patient and past medical records.     Subjective:     Principal Problem:Obesity (BMI 35.0-39.9 without comorbidity)    Chief Complaint: No chief complaint on file.       HPI: Patient is a 39-year-old female with  morbid obesity, dyslipidemia, obstructive sleep apnea who was seen today after gastric bypass surgery Dr. Sampson.  Her current complaints are abdominal pain and nausea.  She denies any fever, chills, shortness of breath, headache, weakness or other complaints at this time.      Past Medical History:   Diagnosis Date    H/O gastric banding 2014    History of removal of laparoscopic gastric banding device 09/12/2022    Obstructive sleep apnea     S/P gastric surgery 03/09/2016       Past Surgical History:   Procedure Laterality Date    BREAST REDUCTION      BREAST SURGERY      COLONOSCOPY N/A 4/28/2023    Procedure: COLONOSCOPY;  Surgeon: Sharon Cobos MD;  Location: [facility];  Service: Endoscopy;  Laterality: N/A;  lm/ppm    KNEE SURGERY Bilateral     SCOPE    LAPAROSCOPIC GASTRIC BANDING      LAPAROSCOPIC REMOVAL OF GASTRIC BAND N/A 08/19/2022    Procedure: REMOVAL, GASTRIC BAND, LAPAROSCOPIC;  Surgeon: Paresh Sampson MD;  Location: [facility] OR;  Service: General;  Laterality: N/A;       Review of patient's allergies indicates:   Allergen Reactions    Chocolate low lactose Hives     Hives and throat swelling with Chocolate    Benadryl [diphenhydramine hcl] Hives    Phentermine Hives and Itching    Codeine Hives and Nausea And Vomiting     HIVES       No current facility-administered medications on file prior to encounter.     Current Outpatient Medications on File Prior to Encounter    Medication Sig    multivit-min/iron/folic acid/K (BARIATRIC MULTIVITAMINS ORAL) Take 1 capsule by mouth Daily.     Family History       Problem Relation (Age of Onset)    Cancer Maternal Grandmother, Maternal Grandfather, Paternal Grandmother, Paternal Grandfather    Diabetes Mother, Maternal Aunt, Maternal Uncle, Paternal Aunt, Paternal Uncle    Heart disease Mother    Obesity Mother, Sister, Sister          Tobacco Use    Smoking status: Former     Packs/day: 0.50     Types: Cigarettes     Quit date: 10/18/2022     Years since quittin.5    Smokeless tobacco: Never   Substance and Sexual Activity    Alcohol use: Yes     Comment: Socially    Drug use: Never    Sexual activity: Yes     Partners: Male     Review of Systems   Constitutional:  Negative for chills and fever.   HENT:  Negative for congestion and rhinorrhea.    Respiratory:  Negative for cough, shortness of breath and wheezing.    Cardiovascular:  Negative for chest pain, palpitations and leg swelling.   Gastrointestinal:  Positive for abdominal pain and nausea. Negative for abdominal distention and vomiting.   Endocrine: Negative for cold intolerance and heat intolerance.   Genitourinary:  Negative for dysuria and urgency.   Musculoskeletal:  Negative for arthralgias and neck stiffness.   Skin:  Negative for rash and wound.   Neurological:  Negative for dizziness and weakness.   Objective:     Vital Signs (Most Recent):  Temp: 98.4 °F (36.9 °C) (23)  Pulse: 81 (23)  Resp: 12 (23)  BP: 131/87 (23)  SpO2: 97 % (23) Vital Signs (24h Range):  Temp:  [97.9 °F (36.6 °C)-98.4 °F (36.9 °C)] 98.4 °F (36.9 °C)  Pulse:  [54-94] 81  Resp:  [12-19] 12  SpO2:  [97 %-100 %] 97 %  BP: (106-132)/(62-87) 131/87     Weight: 101.2 kg (223 lb)  Body mass index is 36.54 kg/m².    Physical Exam  Constitutional:       General: She is not in acute distress.     Appearance: She is well-developed.   HENT:      Head:  Normocephalic and atraumatic.   Eyes:      Pupils: Pupils are equal, round, and reactive to light.   Cardiovascular:      Rate and Rhythm: Normal rate and regular rhythm.      Heart sounds: No murmur heard.  Pulmonary:      Effort: Pulmonary effort is normal. No respiratory distress.      Breath sounds: Normal breath sounds. No wheezing or rales.   Abdominal:      General: Bowel sounds are normal. There is no distension.      Palpations: Abdomen is soft.      Tenderness: There is abdominal tenderness.      Comments: Bandages clean/dry/intact   Musculoskeletal:         General: Normal range of motion.   Skin:     General: Skin is warm and dry.      Findings: No rash.   Neurological:      Mental Status: She is alert and oriented to person, place, and time.      Cranial Nerves: No cranial nerve deficit.   Psychiatric:         Behavior: Behavior normal.         CRANIAL NERVES     CN III, IV, VI   Pupils are equal, round, and reactive to light.     Significant Labs: All pertinent labs within the past 24 hours have been reviewed.    Significant Imaging: I have reviewed all pertinent imaging results/findings within the past 24 hours.    Assessment/Plan:     * Obesity (BMI 35.0-39.9 without comorbidity)  Body mass index is 36.54 kg/m². Morbid obesity complicates all aspects of disease management from diagnostic modalities to treatment.   Status post gastric bypass surgery with Dr. Sampson  Pain and nausea control  Monitor H&H        Dyslipidemia   Patient is not chronically on statin Monitor clinically. Last LDL was   Lab Results   Component Value Date    LDLCALC 137.4 02/10/2023            VTE Risk Mitigation (From admission, onward)    None                     Jennifer Meek MD  Department of Hospital Medicine  [facility]

## 2023-05-01 NOTE — ASSESSMENT & PLAN NOTE
Body mass index is 36.54 kg/m². Morbid obesity complicates all aspects of disease management from diagnostic modalities to treatment.   Status post gastric bypass surgery with Dr. Sampson  Pain and nausea control  Monitor H&H

## 2023-05-01 NOTE — ASSESSMENT & PLAN NOTE
Patient is not chronically on statin Monitor clinically. Last LDL was   Lab Results   Component Value Date    LDLCALC 137.4 02/10/2023

## 2023-05-01 NOTE — HPI
Patient is a 39-year-old female with  morbid obesity, dyslipidemia, obstructive sleep apnea who was seen today after gastric bypass surgery Dr. Sampson.  Her current complaints are abdominal pain and nausea.  She denies any fever, chills, shortness of breath, headache, weakness or other complaints at this time.

## 2023-05-01 NOTE — ANESTHESIA PREPROCEDURE EVALUATION
05/01/2023  Nano White is a 47 y.o., female.      Pre-op Assessment    I have reviewed the Patient Summary Reports.     I have reviewed the Nursing Notes. I have reviewed the NPO Status.   I have reviewed the Medications.     Review of Systems  Anesthesia Hx:  No problems with previous Anesthesia    Social:  Former Smoker    Cardiovascular:  Cardiovascular Normal     Pulmonary:   Sleep Apnea    Renal/:  Renal/ Normal     Neurological:  Neurology Normal    Endocrine:  Endocrine Normal  Obesity / BMI > 30      Physical Exam  General: Well nourished, Cooperative, Alert and Oriented    Airway:  Mallampati: II   Mouth Opening: Normal  TM Distance: Normal  Neck ROM: Normal ROM        Anesthesia Plan  Type of Anesthesia, risks & benefits discussed:    Anesthesia Type: Gen ETT, Gen Supraglottic Airway, Gen Natural Airway, MAC  Intra-op Monitoring Plan: Standard ASA Monitors  Post Op Pain Control Plan: multimodal analgesia  Induction:  IV  Airway Plan: Direct, Video and Fiberoptic, Post-Induction  Informed Consent: Informed consent signed with the Patient and all parties understand the risks and agree with anesthesia plan.  All questions answered.   ASA Score: 2    Ready For Surgery From Anesthesia Perspective.     .

## 2023-05-02 VITALS
HEIGHT: 65 IN | RESPIRATION RATE: 18 BRPM | HEART RATE: 66 BPM | DIASTOLIC BLOOD PRESSURE: 68 MMHG | WEIGHT: 225 LBS | OXYGEN SATURATION: 97 % | SYSTOLIC BLOOD PRESSURE: 123 MMHG | TEMPERATURE: 98 F | BODY MASS INDEX: 37.49 KG/M2

## 2023-05-02 LAB
ANION GAP SERPL CALC-SCNC: 10 MMOL/L (ref 8–16)
BASOPHILS # BLD AUTO: 0.03 K/UL (ref 0–0.2)
BASOPHILS NFR BLD: 0.3 % (ref 0–1.9)
BUN SERPL-MCNC: 8 MG/DL (ref 6–20)
CALCIUM SERPL-MCNC: 8.2 MG/DL (ref 8.7–10.5)
CHLORIDE SERPL-SCNC: 107 MMOL/L (ref 95–110)
CO2 SERPL-SCNC: 22 MMOL/L (ref 23–29)
CREAT SERPL-MCNC: 0.7 MG/DL (ref 0.5–1.4)
DIFFERENTIAL METHOD: ABNORMAL
EOSINOPHIL # BLD AUTO: 0 K/UL (ref 0–0.5)
EOSINOPHIL NFR BLD: 0.3 % (ref 0–8)
ERYTHROCYTE [DISTWIDTH] IN BLOOD BY AUTOMATED COUNT: 12.8 % (ref 11.5–14.5)
EST. GFR  (NO RACE VARIABLE): >60 ML/MIN/1.73 M^2
GLUCOSE SERPL-MCNC: 92 MG/DL (ref 70–110)
HCT VFR BLD AUTO: 35.7 % (ref 37–48.5)
HGB BLD-MCNC: 11.9 G/DL (ref 12–16)
IMM GRANULOCYTES # BLD AUTO: 0.04 K/UL (ref 0–0.04)
IMM GRANULOCYTES NFR BLD AUTO: 0.4 % (ref 0–0.5)
LYMPHOCYTES # BLD AUTO: 1 K/UL (ref 1–4.8)
LYMPHOCYTES NFR BLD: 10.7 % (ref 18–48)
MAGNESIUM SERPL-MCNC: 1.9 MG/DL (ref 1.6–2.6)
MCH RBC QN AUTO: 32.1 PG (ref 27–31)
MCHC RBC AUTO-ENTMCNC: 33.3 G/DL (ref 32–36)
MCV RBC AUTO: 96 FL (ref 82–98)
MONOCYTES # BLD AUTO: 0.9 K/UL (ref 0.3–1)
MONOCYTES NFR BLD: 9.3 % (ref 4–15)
NEUTROPHILS # BLD AUTO: 7.4 K/UL (ref 1.8–7.7)
NEUTROPHILS NFR BLD: 79 % (ref 38–73)
NRBC BLD-RTO: 0 /100 WBC
PHOSPHATE SERPL-MCNC: 3.4 MG/DL (ref 2.7–4.5)
PLATELET # BLD AUTO: 207 K/UL (ref 150–450)
PMV BLD AUTO: 10.8 FL (ref 9.2–12.9)
POTASSIUM SERPL-SCNC: 3.7 MMOL/L (ref 3.5–5.1)
RBC # BLD AUTO: 3.71 M/UL (ref 4–5.4)
SODIUM SERPL-SCNC: 139 MMOL/L (ref 136–145)
WBC # BLD AUTO: 9.4 K/UL (ref 3.9–12.7)

## 2023-05-02 PROCEDURE — 85025 COMPLETE CBC W/AUTO DIFF WBC: CPT | Performed by: SURGERY

## 2023-05-02 PROCEDURE — 36415 COLL VENOUS BLD VENIPUNCTURE: CPT | Performed by: SURGERY

## 2023-05-02 PROCEDURE — 94799 UNLISTED PULMONARY SVC/PX: CPT

## 2023-05-02 PROCEDURE — 94761 N-INVAS EAR/PLS OXIMETRY MLT: CPT

## 2023-05-02 PROCEDURE — 63600175 PHARM REV CODE 636 W HCPCS: Performed by: SURGERY

## 2023-05-02 PROCEDURE — 84100 ASSAY OF PHOSPHORUS: CPT | Performed by: SURGERY

## 2023-05-02 PROCEDURE — 80048 BASIC METABOLIC PNL TOTAL CA: CPT | Performed by: SURGERY

## 2023-05-02 PROCEDURE — 25000003 PHARM REV CODE 250: Performed by: SURGERY

## 2023-05-02 PROCEDURE — 83735 ASSAY OF MAGNESIUM: CPT | Performed by: SURGERY

## 2023-05-02 RX ORDER — HYDROCODONE BITARTRATE AND ACETAMINOPHEN 7.5; 325 MG/1; MG/1
1 TABLET ORAL EVERY 4 HOURS PRN
Qty: 20 TABLET | Refills: 0 | Status: SHIPPED | OUTPATIENT
Start: 2023-05-02 | End: 2023-05-08

## 2023-05-02 RX ORDER — ONDANSETRON 4 MG/1
4 TABLET, ORALLY DISINTEGRATING ORAL EVERY 6 HOURS PRN
Qty: 30 TABLET | Refills: 0 | Status: SHIPPED | OUTPATIENT
Start: 2023-05-02 | End: 2023-08-24 | Stop reason: SDUPTHER

## 2023-05-02 RX ADMIN — FAMOTIDINE 20 MG: 10 INJECTION, SOLUTION INTRAVENOUS at 08:05

## 2023-05-02 RX ADMIN — CEFAZOLIN SODIUM 2 G: 2 SOLUTION INTRAVENOUS at 08:05

## 2023-05-02 RX ADMIN — PANTOPRAZOLE SODIUM 40 MG: 40 TABLET, DELAYED RELEASE ORAL at 08:05

## 2023-05-02 RX ADMIN — OXYCODONE HYDROCHLORIDE 10 MG: 10 TABLET ORAL at 10:05

## 2023-05-02 RX ADMIN — SODIUM CHLORIDE, POTASSIUM CHLORIDE, SODIUM LACTATE AND CALCIUM CHLORIDE: 600; 310; 30; 20 INJECTION, SOLUTION INTRAVENOUS at 01:05

## 2023-05-02 RX ADMIN — OXYCODONE HYDROCHLORIDE 10 MG: 10 TABLET ORAL at 01:05

## 2023-05-02 RX ADMIN — CEFAZOLIN SODIUM 2 G: 2 SOLUTION INTRAVENOUS at 01:05

## 2023-05-02 RX ADMIN — MUPIROCIN: 20 OINTMENT TOPICAL at 08:05

## 2023-05-02 NOTE — PLAN OF CARE
Problem: Adult Inpatient Plan of Care  Goal: Plan of Care Review  Outcome: Met  Goal: Patient-Specific Goal (Individualized)  Outcome: Met  Goal: Absence of Hospital-Acquired Illness or Injury  Outcome: Met  Goal: Optimal Comfort and Wellbeing  Outcome: Met  Goal: Readiness for Transition of Care  Outcome: Met     Problem: Infection  Goal: Absence of Infection Signs and Symptoms  Outcome: Met     Problem: Fall Injury Risk  Goal: Absence of Fall and Fall-Related Injury  Outcome: Met   POC has been reviewed with pt.  Dr. Sampson has been to see pt and cleared pt for discharge.  Pt is tolerating clear liquids diet.  Pt is urinating on her own.  No new s/s of infection.  No falls during shift.  Pt adequate for discharge.

## 2023-05-02 NOTE — HOSPITAL COURSE
Patient admitted after gastric bypass surgery with Dr. Sampson.  Pain and nausea were controlled with p.r.n. medications.  She tolerated clear liquid diet.  The next day she was was cleared for discharge by surgeon.  She will follow-up with Dr. Sampson.  
negative...

## 2023-05-02 NOTE — DISCHARGE SUMMARY
[facility] Medicine  Discharge Summary      Patient Name: Nano White  MRN: [id number]  NIGHAT: [id number]  Patient Class: IP- Inpatient  Admission Date: 5/1/2023  Hospital Length of Stay: 1 days  Discharge Date and Time: 5/2/2023  2:27 PM  Attending Physician: No att. providers found   Discharging Provider: Jennifer Meek MD  Primary Care Provider: IBAN Munoz    Primary Care Team: Networked reference to record PCT     HPI:   Patient is a 39-year-old female with  morbid obesity, dyslipidemia, obstructive sleep apnea who was seen today after gastric bypass surgery Dr. Sampson.  Her current complaints are abdominal pain and nausea.  She denies any fever, chills, shortness of breath, headache, weakness or other complaints at this time.      Procedure(s) (LRB):  XI ROBOTIC GASTROENTEROSTOMY, KENNETH-EN-Y (N/A)      Hospital Course:   Patient admitted after gastric bypass surgery with Dr. Sampson.  Pain and nausea were controlled with p.r.n. medications.  She tolerated clear liquid diet.  The next day she was was cleared for discharge by surgeon.  She will follow-up with Dr. Sampson.       Goals of Care Treatment Preferences:  Code Status: Full Code      Consults:     No new Assessment & Plan notes have been filed under this hospital service since the last note was generated.  Service: Hospital Medicine    Final Active Diagnoses:    Diagnosis Date Noted POA    PRINCIPAL PROBLEM:  Obesity (BMI 35.0-39.9 without comorbidity) [E66.9] 02/07/2023 Yes    Dyslipidemia [E78.5] 05/01/2023 Yes      Problems Resolved During this Admission:       Discharged Condition: good    Disposition: Home or Self Care    Follow Up:   Follow-up Information     Paresh Sampson MD Follow up.    Specialties: General Surgery, Bariatrics, Surgery  Contact information:  [address]  [address]  [place] [address] [phone]                       Patient Instructions:      Notify your health care provider if you  experience any of the following:  temperature >100.4     Notify your health care provider if you experience any of the following:  severe uncontrolled pain     Notify your health care provider if you experience any of the following:  redness, tenderness, or signs of infection (pain, swelling, redness, odor or green/yellow discharge around incision site)     Activity as tolerated       Significant Diagnostic Studies: Labs:   CMP   Recent Labs   Lab 05/02/23 0427      K 3.7      CO2 22*   GLU 92   BUN 8   CREATININE 0.7   CALCIUM 8.2*   ANIONGAP 10    and CBC   Recent Labs   Lab 05/02/23 0427   WBC 9.40   HGB 11.9*   HCT 35.7*          Pending Diagnostic Studies:     None         Medications:  Reconciled Home Medications:      Medication List      START taking these medications    HYDROcodone-acetaminophen 7.5-325 mg per tablet  Commonly known as: NORCO  Take 1 tablet by mouth every 4 (four) hours as needed for Pain.     ondansetron 4 MG Tbdl  Commonly known as: ZOFRAN-ODT  Take 1 tablet (4 mg total) by mouth every 6 (six) hours as needed (nv).        CONTINUE taking these medications    BARIATRIC MULTIVITAMINS ORAL  Take 1 capsule by mouth Daily.            Indwelling Lines/Drains at time of discharge:   Lines/Drains/Airways     None                 Time spent on the discharge of patient: 35 minutes         Jennifer Meek MD  Department of Hospital Medicine  [facility]

## 2023-05-02 NOTE — PLAN OF CARE
Pt cleared from case management     05/02/23 1125   Final Note   Assessment Type Final Discharge Note   Anticipated Discharge Disposition Home   What phone number can be called within the next 1-3 days to see how you are doing after discharge?   ([phone])   Hospital Resources/Appts/Education Provided Appointments scheduled and added to AVS

## 2023-05-02 NOTE — PLAN OF CARE
Plan of care reviewed with patient. Verbalized understanding. IV Intact with fluids infusing. Lap sites to abdomen intact with no drainage. Tele in place and being monitored. Patient has been ambulating without difficulty. Pain managed with prn medications. Tolerating diet with no nausea. Safety maintained. Call light in reach and instructed to call for assistance. Will continue to monitor.

## 2023-05-02 NOTE — PROGRESS NOTES
Progress Note  Gen Surg    Admit Date: 5/1/2023  Attending: Camilla  S/P: Procedure(s) (LRB):  XI ROBOTIC GASTROENTEROSTOMY, KENNETH-EN-Y (N/A)    Post-operative Day: 1 Day Post-Op    Hospital Day: 2    SUBJECTIVE:     Doing well  Tolerating liquids       OBJECTIVE:     Vital Signs (Most Recent)  Temp: 98.1 °F (36.7 °C) (05/02/23 1100)  Pulse: 66 (05/02/23 1100)  Resp: 20 (05/02/23 1100)  BP: 123/68 (05/02/23 1100)  SpO2: 97 % (05/02/23 1100)    Vital Signs Range (Last 24H):  Temp:  [97.6 °F (36.4 °C)-98.6 °F (37 °C)]   Pulse:  [62-94]   Resp:  [12-20]   BP: (100-131)/(58-87)   SpO2:  [91 %-100 %]     I & O (Last 24H):  Intake/Output Summary (Last 24 hours) at 5/2/2023 1342  Last data filed at 5/2/2023 0930  Gross per 24 hour   Intake 3373.74 ml   Output 1450 ml   Net 1923.74 ml       Scheduled medications:   enoxaparin  40 mg Subcutaneous Daily    famotidine (PF)  20 mg Intravenous Q12H    mupirocin   Nasal BID    pantoprazole  40 mg Oral Daily    scopolamine  1 patch Transdermal Once Pre-Op       Physical Exam:  General: no distress  Lungs:  clear to auscultation bilaterally and normal respiratory effort  Heart: regular rate and rhythm, S1, S2 normal, no murmur, rub or gallop  Abdomen: soft, non-tender non-distented; bowel sounds normal; no masses,  no organomegaly    Wound/Incision:  clean, dry, intact    Laboratory:  Labs within the past 24 hours have been reviewed.    ASSESSMENT/PLAN:       Ok to yuniel Sampson MD

## 2023-05-02 NOTE — NURSING
Pt discharged to home with significant other at side.  Pt rolled down with w/c to vehicle.  Pt verbalized understanding of discharge orders.   no c/o pain/nausea/vomitting.  Will CTM.

## 2023-05-02 NOTE — PLAN OF CARE
[facility]  Initial Discharge Assessment       Primary Care Provider: IBAN Munoz    Admission Diagnosis: Morbid obesity [E66.01]  BMI 36.0-36.9,adult [Z68.36]  High cholesterol [E78.00]  ADELIA (obstructive sleep apnea) [G47.33]    Admission Date: 5/1/2023  Expected Discharge Date: 5/2/2023    Met with pt at bedside to complete discharge assessment, verified PCP, pharmacy and information on facesheet.  No HH, DME or dialysis.  Spouse will drive pt home.  No needs.    Discharge Barriers Identified: None    Payor: BLUE CROSS BLUE SHIELD / Plan: BCBS ALL OUT OF STATE / Product Type: PPO /     Extended Emergency Contact Information  Primary Emergency Contact: Hemant White  Address: [address] [place] [address] [facility]  Home Phone: [phone]  Relation: Spouse    Discharge Plan A: Home  Discharge Plan B: Home      [id number] - [place] - [address] [facility] [address]  [address] [address]  Phone: [phone] Fax: [phone]      Initial Assessment (most recent)       Adult Discharge Assessment - 05/02/23 1109          Discharge Assessment    Assessment Type Discharge Planning Assessment     Confirmed/corrected address, phone number and insurance Yes     Confirmed Demographics Correct on Facesheet     Source of Information patient     Communicated ROCIO with patient/caregiver Yes     People in Home spouse     Do you expect to return to your current living situation? Yes     Do you have help at home or someone to help you manage your care at home? Yes     Who are your caregiver(s) and their phone number(s)? spouseLuis Daniel     Prior to hospitilization cognitive status: Alert/Oriented     Current cognitive status: Alert/Oriented     Home Accessibility wheelchair accessible     Home Layout Able to live on 1st floor     Readmission within 30 days? No     Patient currently being followed by  outpatient case management? No     Do you currently have service(s) that help you manage your care at home? No     Do you take prescription medications? No     Do you have prescription coverage? Yes     Coverage BCBS     Do you have any problems affording any of your prescribed medications? No     Who is going to help you get home at discharge? spouse, Luis Daniel     How do you get to doctors appointments? car, drives self     Are you on dialysis? No     Do you take coumadin? No     Discharge Plan A Home     Discharge Plan B Home     DME Needed Upon Discharge  none     Discharge Plan discussed with: Patient     Discharge Barriers Identified None

## 2023-05-03 ENCOUNTER — PATIENT OUTREACH (OUTPATIENT)
Dept: ADMINISTRATIVE | Facility: CLINIC | Age: 48
End: 2023-05-03
Payer: COMMERCIAL

## 2023-05-03 NOTE — PROGRESS NOTES
C3 nurse spoke with Nano White for a TCC post hospital discharge follow up call. The patient does not have a scheduled HOSFU appointment with IBAN Munoz within 5-7 days post hospital discharge date 05/02/2023. C3 nurse was unable to schedule HOSFU appointment in [facility].    Message sent to PCP staff requesting they contact patient and schedule follow up appointment.

## 2023-05-08 ENCOUNTER — OFFICE VISIT (OUTPATIENT)
Dept: FAMILY MEDICINE | Facility: CLINIC | Age: 48
End: 2023-05-08
Payer: COMMERCIAL

## 2023-05-08 DIAGNOSIS — E66.9 OBESITY (BMI 35.0-39.9 WITHOUT COMORBIDITY): ICD-10-CM

## 2023-05-08 DIAGNOSIS — Z09 HOSPITAL DISCHARGE FOLLOW-UP: Primary | ICD-10-CM

## 2023-05-08 PROCEDURE — 3044F HG A1C LEVEL LT 7.0%: CPT | Mod: CPTII,95,, | Performed by: PHYSICIAN ASSISTANT

## 2023-05-08 PROCEDURE — 1160F PR REVIEW ALL MEDS BY PRESCRIBER/CLIN PHARMACIST DOCUMENTED: ICD-10-PCS | Mod: CPTII,95,, | Performed by: PHYSICIAN ASSISTANT

## 2023-05-08 PROCEDURE — 3044F PR MOST RECENT HEMOGLOBIN A1C LEVEL <7.0%: ICD-10-PCS | Mod: CPTII,95,, | Performed by: PHYSICIAN ASSISTANT

## 2023-05-08 PROCEDURE — 99495 TRANSJ CARE MGMT MOD F2F 14D: CPT | Mod: 95,,, | Performed by: PHYSICIAN ASSISTANT

## 2023-05-08 PROCEDURE — 99495 TCM SERVICES (MODERATE COMPLEXITY): ICD-10-PCS | Mod: 95,,, | Performed by: PHYSICIAN ASSISTANT

## 2023-05-08 PROCEDURE — 1159F MED LIST DOCD IN RCRD: CPT | Mod: CPTII,95,, | Performed by: PHYSICIAN ASSISTANT

## 2023-05-08 PROCEDURE — 1159F PR MEDICATION LIST DOCUMENTED IN MEDICAL RECORD: ICD-10-PCS | Mod: CPTII,95,, | Performed by: PHYSICIAN ASSISTANT

## 2023-05-08 PROCEDURE — 1160F RVW MEDS BY RX/DR IN RCRD: CPT | Mod: CPTII,95,, | Performed by: PHYSICIAN ASSISTANT

## 2023-05-08 NOTE — PROGRESS NOTES
Transitional Care Note    Family and/or Caretaker present at visit?  No.  Diagnostic tests reviewed/disposition: No diagnosic tests pending after this hospitalization.  Disease/illness education: yes  Home health/community services discussion/referrals: Patient does not have home health established from hospital visit.  They do not need home health.  If needed, we will set up home health for the patient.   Establishment or re-establishment of referral orders for community resources: No other necessary community resources.   Discussion with other health care providers: No discussion with other health care providers necessary.     The patient location is: [place]  The chief complaint leading to consultation is: Hospital discharge follow up    Visit type: audiovisual    Face to Face time with patient: 15 minutes of total time spent on the encounter, which includes face to face time and non-face to face time preparing to see the patient (eg, review of tests), Obtaining and/or reviewing separately obtained history, Documenting clinical information in the electronic or other health record, Independently interpreting results (not separately reported) and communicating results to the patient/family/caregiver, or Care coordination (not separately reported).         Each patient to whom he or she provides medical services by telemedicine is:  (1) informed of the relationship between the physician and patient and the respective role of any other health care provider with respect to management of the patient; and (2) notified that he or she may decline to receive medical services by telemedicine and may withdraw from such care at any time.    Notes:  Presents for follow-up of hospital discharge summary.  She had bariatric surgery one-week ago today.  She states today is the 1st day she is really up and moving around it is taking it slowly.  She is still using Zofran for some breakthrough nausea but is completely off pain  medication.  She states she has not had a bowel movement in several days but has been on a clear liquid diet but denies any stomach discomfort.  She does have follow-up scheduled next week with the bariatric surgeon.  She states she has had a 4 lb weight loss this week but is having some issues with bloating and gas.  This is relieved with Gas-X Patient has been struggling slightly with getting the amount of protein required in during the day but has started doing some research and has some protein being delivered today.  Yesterday she did have solid food for the 1st time as she had a little bit of asparagus and tolerated this well.  No complaints today and patient has a very positive outlook.      Current Outpatient Medications:     multivit-min/iron/folic acid/K (BARIATRIC MULTIVITAMINS ORAL), Take 1 capsule by mouth Daily., Disp: , Rfl:     ondansetron (ZOFRAN-ODT) 4 MG TbDL, Take 1 tablet (4 mg total) by mouth every 6 (six) hours as needed (nv)., Disp: 30 tablet, Rfl: 0     Nano was seen today for hospital follow up.    Diagnoses and all orders for this visit:    Hospital discharge follow-up    Obesity (BMI 35.0-39.9 without comorbidity)     Continue with current recommendations from bariatric surgery.  Keep all follow-up scheduled.  Patient will let us know if she needs anything.       Answers submitted by the patient for this visit:  Review of Systems Questionnaire (Submitted on 5/8/2023)  activity change: No  unexpected weight change: No  neck pain: No  hearing loss: No  rhinorrhea: No  trouble swallowing: No  eye discharge: No  visual disturbance: No  chest tightness: No  wheezing: No  chest pain: No  palpitations: No  blood in stool: No  constipation: No  vomiting: No  diarrhea: No  polydipsia: No  polyuria: No  difficulty urinating: No  hematuria: No  menstrual problem: No  dysuria: No  joint swelling: No  arthralgias: No  headaches: No  weakness: No  confusion: No  dysphoric mood: No

## 2023-05-16 ENCOUNTER — OFFICE VISIT (OUTPATIENT)
Dept: BARIATRICS | Facility: CLINIC | Age: 48
End: 2023-05-16
Payer: COMMERCIAL

## 2023-05-16 ENCOUNTER — CLINICAL SUPPORT (OUTPATIENT)
Dept: BARIATRICS | Facility: CLINIC | Age: 48
End: 2023-05-16
Payer: COMMERCIAL

## 2023-05-16 VITALS
RESPIRATION RATE: 16 BRPM | SYSTOLIC BLOOD PRESSURE: 132 MMHG | DIASTOLIC BLOOD PRESSURE: 79 MMHG | BODY MASS INDEX: 34.68 KG/M2 | WEIGHT: 215.81 LBS | HEART RATE: 69 BPM | HEIGHT: 66 IN | TEMPERATURE: 98 F

## 2023-05-16 VITALS — WEIGHT: 215.81 LBS | HEIGHT: 66 IN | BODY MASS INDEX: 34.68 KG/M2

## 2023-05-16 DIAGNOSIS — E66.01 MORBID OBESITY: ICD-10-CM

## 2023-05-16 DIAGNOSIS — E66.9 OBESITY (BMI 35.0-39.9 WITHOUT COMORBIDITY): ICD-10-CM

## 2023-05-16 DIAGNOSIS — G47.33 OSA (OBSTRUCTIVE SLEEP APNEA): ICD-10-CM

## 2023-05-16 DIAGNOSIS — Z71.3 ENCOUNTER FOR DIETARY COUNSELING AND SURVEILLANCE: Primary | ICD-10-CM

## 2023-05-16 DIAGNOSIS — E78.00 HIGH CHOLESTEROL: ICD-10-CM

## 2023-05-16 PROCEDURE — 3044F HG A1C LEVEL LT 7.0%: CPT | Mod: CPTII,S$GLB,, | Performed by: SURGERY

## 2023-05-16 PROCEDURE — 99999 PR PBB SHADOW E&M-EST. PATIENT-LVL III: CPT | Mod: PBBFAC,,, | Performed by: SURGERY

## 2023-05-16 PROCEDURE — 3008F BODY MASS INDEX DOCD: CPT | Mod: CPTII,S$GLB,, | Performed by: SURGERY

## 2023-05-16 PROCEDURE — 3075F SYST BP GE 130 - 139MM HG: CPT | Mod: CPTII,S$GLB,, | Performed by: SURGERY

## 2023-05-16 PROCEDURE — 99024 POSTOP FOLLOW-UP VISIT: CPT | Mod: S$GLB,,, | Performed by: SURGERY

## 2023-05-16 PROCEDURE — 99999 PR PBB SHADOW E&M-EST. PATIENT-LVL III: ICD-10-PCS | Mod: PBBFAC,,, | Performed by: SURGERY

## 2023-05-16 PROCEDURE — 3078F DIAST BP <80 MM HG: CPT | Mod: CPTII,S$GLB,, | Performed by: SURGERY

## 2023-05-16 PROCEDURE — 99999 PR PBB SHADOW E&M-EST. PATIENT-LVL III: ICD-10-PCS | Mod: PBBFAC,,,

## 2023-05-16 PROCEDURE — 3044F PR MOST RECENT HEMOGLOBIN A1C LEVEL <7.0%: ICD-10-PCS | Mod: CPTII,S$GLB,, | Performed by: SURGERY

## 2023-05-16 PROCEDURE — 1159F MED LIST DOCD IN RCRD: CPT | Mod: CPTII,S$GLB,, | Performed by: SURGERY

## 2023-05-16 PROCEDURE — 99999 PR PBB SHADOW E&M-EST. PATIENT-LVL III: CPT | Mod: PBBFAC,,,

## 2023-05-16 PROCEDURE — 1159F PR MEDICATION LIST DOCUMENTED IN MEDICAL RECORD: ICD-10-PCS | Mod: CPTII,S$GLB,, | Performed by: SURGERY

## 2023-05-16 PROCEDURE — 3078F PR MOST RECENT DIASTOLIC BLOOD PRESSURE < 80 MM HG: ICD-10-PCS | Mod: CPTII,S$GLB,, | Performed by: SURGERY

## 2023-05-16 PROCEDURE — 3008F PR BODY MASS INDEX (BMI) DOCUMENTED: ICD-10-PCS | Mod: CPTII,S$GLB,, | Performed by: SURGERY

## 2023-05-16 PROCEDURE — 3075F PR MOST RECENT SYSTOLIC BLOOD PRESS GE 130-139MM HG: ICD-10-PCS | Mod: CPTII,S$GLB,, | Performed by: SURGERY

## 2023-05-16 PROCEDURE — 99024 PR POST-OP FOLLOW-UP VISIT: ICD-10-PCS | Mod: S$GLB,,, | Performed by: SURGERY

## 2023-05-16 NOTE — PROGRESS NOTES
NUTRITION NOTE    Referring Physician: Dr. Sampson  Reason for MNT Referral: Follow-up 2 Weeks s/p Gastric Bypass    PAST MEDICAL HISTORY:  Denies vomiting, constipation, and diarrhea.  Reports problems, including intermittent nausea .    Past Medical History:   Diagnosis Date    H/O gastric banding 2014    History of removal of laparoscopic gastric banding device 09/12/2022    Obstructive sleep apnea     S/P gastric surgery 03/09/2016       CLINICAL DATA:  48 y.o. female.    There were no vitals filed for this visit.    Current Weight: 215.8 lbs  BMI: 35.36  Total Weight Loss: -13 lbs    LABS:  Reviewed from 5/2/23.    CURRENT DIET:  Bariatric Liquid Diet    Diet Recall: 60-70 grams of protein/day; >64 oz of fluids/day    Diet Includes: [id number], Dinora's high protein, zucchini and squash cooked down with stewed tomatoes - skin removed, canned asparagus tops, hot and sour soup, whey protein being added to pudding and jello  Beverages: decaf crystal light x 3 cups, 28oz. Gatorade bottle filling with water x2/d  Protein Supplements: Franchesca pro 30g, atkins high protein 15g, Fairlife 30g, protein 20 15g    EXERCISE:  Adequate light exercise.Walking dogs down the block and back.    Restrictions to Exercise: None.    VITAMINS/MINERALS:  Multivitamins: Bariatric Pal   B-Complex: Included with multivitamin.  Calcium Citrate + Vitamin D: Nature's Made 600mg x 2 (1200mg total)  Vitamin B12:  Included with multivitamin    Reports taking bariatric pal and calcium in the morning. Advises patient to place MVI in the evening due to iron and calcium nutrient interactions.   Taking an additional potassium supplement to aid in restless leg syndrome. RD informed patient that hydration and electrolytes could aid in restless leg as well. Encouraged liquid IV and SF pedialyte to aid in getting in more potassium vs. Supplement usage.     ASSESSMENT:  Doing well overall.  Excess protein intake.  Excess fluid intake.    BARIATRIC DIET  DISCUSSION:  Instructed and provided written materials on bariatric liquid diet plan.  Reinforced post-op nutrition guidelines.    PLAN/RECOMMONDATIONS:  Advance to bariatric soft diet.  Maintain protein intake.  Maintain fluid intake.  Begin light exercise.  Continue appropriate vitamins & minerals.  Adjust vitamins & minerals by: Begin taking bariatric MVI in evening with calcium at breakfast and lunch to ensure adequate absorption of iron and calcium.     Return to clinic in 4 weeks.    SESSION TIME: 60 minutes

## 2023-05-16 NOTE — PROGRESS NOTES
Post Op Note    Surgery: gastric bypass surgery  Date: 5/1/23  Initial weight: 226  Last weight: 224  Current weight: 215    Constipation: none  Reflux: none  Vomiting: none    Diet:  Breakfast:protein shakes  Lunch: broth or sips of something  Dinner: vegetables, squash, zucchini, spinach in a scan    Exercise:  Walking around block with dogs    MVI: florentin mvi, calcium with d, potassium     Vitals:    05/16/23 0831   BP: 132/79   Pulse: 69   Resp: 16   Temp: 98.4 °F (36.9 °C)       Body comp:  Fat Percent:  48.4 %  Fat Mass:  104.4 lb  FFM:  111.4 lb  TBW: 80.6 lb  TBW %:  37.3 %  BMR: 1588 kcal    PE:  NAD  RRR  Soft/nt/nd  Incisions: well healed    Labs: none    A/P: s/p gastric bypass     Counseling for patient:    Diet: continue protocol  Exercise: ok for cardio, no heavy lifting over 30 pounds for another month  Vitamins: 2 mvi daily, calcium, and b complex    Co morbidities:     1. BMI 36.0-36.9,adult        2. Morbid obesity        3. High cholesterol        4. ADELIA (obstructive sleep apnea)            : I met with the patient for 15 minutes and counseled her for over 50% of that time

## 2023-06-20 ENCOUNTER — OFFICE VISIT (OUTPATIENT)
Dept: BARIATRICS | Facility: CLINIC | Age: 48
End: 2023-06-20
Payer: COMMERCIAL

## 2023-06-20 VITALS
WEIGHT: 203.81 LBS | RESPIRATION RATE: 16 BRPM | SYSTOLIC BLOOD PRESSURE: 124 MMHG | DIASTOLIC BLOOD PRESSURE: 86 MMHG | TEMPERATURE: 98 F | HEART RATE: 57 BPM | HEIGHT: 66 IN | BODY MASS INDEX: 32.76 KG/M2

## 2023-06-20 DIAGNOSIS — G47.33 OSA (OBSTRUCTIVE SLEEP APNEA): ICD-10-CM

## 2023-06-20 DIAGNOSIS — E66.01 MORBID OBESITY: Primary | ICD-10-CM

## 2023-06-20 DIAGNOSIS — E78.5 DYSLIPIDEMIA: ICD-10-CM

## 2023-06-20 PROCEDURE — 3044F HG A1C LEVEL LT 7.0%: CPT | Mod: CPTII,S$GLB,, | Performed by: SURGERY

## 2023-06-20 PROCEDURE — 3044F PR MOST RECENT HEMOGLOBIN A1C LEVEL <7.0%: ICD-10-PCS | Mod: CPTII,S$GLB,, | Performed by: SURGERY

## 2023-06-20 PROCEDURE — 1159F MED LIST DOCD IN RCRD: CPT | Mod: CPTII,S$GLB,, | Performed by: SURGERY

## 2023-06-20 PROCEDURE — 3008F BODY MASS INDEX DOCD: CPT | Mod: CPTII,S$GLB,, | Performed by: SURGERY

## 2023-06-20 PROCEDURE — 3074F SYST BP LT 130 MM HG: CPT | Mod: CPTII,S$GLB,, | Performed by: SURGERY

## 2023-06-20 PROCEDURE — 99999 PR PBB SHADOW E&M-EST. PATIENT-LVL III: CPT | Mod: PBBFAC,,, | Performed by: SURGERY

## 2023-06-20 PROCEDURE — 99024 POSTOP FOLLOW-UP VISIT: CPT | Mod: S$GLB,,, | Performed by: SURGERY

## 2023-06-20 PROCEDURE — 99999 PR PBB SHADOW E&M-EST. PATIENT-LVL III: ICD-10-PCS | Mod: PBBFAC,,, | Performed by: SURGERY

## 2023-06-20 PROCEDURE — 1159F PR MEDICATION LIST DOCUMENTED IN MEDICAL RECORD: ICD-10-PCS | Mod: CPTII,S$GLB,, | Performed by: SURGERY

## 2023-06-20 PROCEDURE — 3079F PR MOST RECENT DIASTOLIC BLOOD PRESSURE 80-89 MM HG: ICD-10-PCS | Mod: CPTII,S$GLB,, | Performed by: SURGERY

## 2023-06-20 PROCEDURE — 3079F DIAST BP 80-89 MM HG: CPT | Mod: CPTII,S$GLB,, | Performed by: SURGERY

## 2023-06-20 PROCEDURE — 3008F PR BODY MASS INDEX (BMI) DOCUMENTED: ICD-10-PCS | Mod: CPTII,S$GLB,, | Performed by: SURGERY

## 2023-06-20 PROCEDURE — 99024 PR POST-OP FOLLOW-UP VISIT: ICD-10-PCS | Mod: S$GLB,,, | Performed by: SURGERY

## 2023-06-20 PROCEDURE — 3074F PR MOST RECENT SYSTOLIC BLOOD PRESSURE < 130 MM HG: ICD-10-PCS | Mod: CPTII,S$GLB,, | Performed by: SURGERY

## 2023-06-20 NOTE — PROGRESS NOTES
Post Op Note    Surgery: gastric bypass surgery  Date: 5/1/23  Initial weight: 226  Last weight: 215  Current weight: 203    Constipation: none  Reflux: none  Vomiting: none    Diet:  Breakfast:  chicken sausages, eggs  Lunch: tuna pack, grilled chicken, tuna  Dinner: protein and vegetables    Exercise:  Gym and pool 5 days per week     MVI:  florentin mvi, calcium with d, potassium     Vitals:    06/20/23 1055   BP: 124/86   Pulse: (!) 57   Resp: 16   Temp: 98.3 °F (36.8 °C)       Body comp:  Fat Percent:  44.6 %  Fat Mass:  90.8 lb  FFM:  113 lb  TBW: 81.2 lb  TBW %:  39.8 %  BMR: 1584 kcal    PE:  NAD  RRR  Soft/nt/nd    Labs: none    A/P: s/p gastric bypass     Counseling for patient:    Diet: continue diet plan  Exercise: no restriction   Vitamins: continue vitamin regimen     Co morbidities:     1. Morbid obesity        2. BMI 36.0-36.9,adult        3. ADELIA (obstructive sleep apnea)        4. Dyslipidemia            -All above: Evaluated, monitored, and treated with diet and exercise program.

## 2023-08-21 ENCOUNTER — TELEPHONE (OUTPATIENT)
Dept: BARIATRICS | Facility: CLINIC | Age: 48
End: 2023-08-21
Payer: COMMERCIAL

## 2023-08-24 ENCOUNTER — OFFICE VISIT (OUTPATIENT)
Dept: BARIATRICS | Facility: CLINIC | Age: 48
End: 2023-08-24
Payer: COMMERCIAL

## 2023-08-24 ENCOUNTER — TELEPHONE (OUTPATIENT)
Dept: BARIATRICS | Facility: CLINIC | Age: 48
End: 2023-08-24

## 2023-08-24 VITALS
HEART RATE: 56 BPM | TEMPERATURE: 98 F | RESPIRATION RATE: 16 BRPM | WEIGHT: 196 LBS | HEIGHT: 66 IN | SYSTOLIC BLOOD PRESSURE: 124 MMHG | DIASTOLIC BLOOD PRESSURE: 80 MMHG | BODY MASS INDEX: 31.5 KG/M2

## 2023-08-24 DIAGNOSIS — E66.01 MORBID OBESITY: Primary | ICD-10-CM

## 2023-08-24 DIAGNOSIS — G47.33 OSA (OBSTRUCTIVE SLEEP APNEA): ICD-10-CM

## 2023-08-24 PROCEDURE — 3079F DIAST BP 80-89 MM HG: CPT | Mod: CPTII,S$GLB,, | Performed by: SURGERY

## 2023-08-24 PROCEDURE — 3074F PR MOST RECENT SYSTOLIC BLOOD PRESSURE < 130 MM HG: ICD-10-PCS | Mod: CPTII,S$GLB,, | Performed by: SURGERY

## 2023-08-24 PROCEDURE — 3044F HG A1C LEVEL LT 7.0%: CPT | Mod: CPTII,S$GLB,, | Performed by: SURGERY

## 2023-08-24 PROCEDURE — 3079F PR MOST RECENT DIASTOLIC BLOOD PRESSURE 80-89 MM HG: ICD-10-PCS | Mod: CPTII,S$GLB,, | Performed by: SURGERY

## 2023-08-24 PROCEDURE — 1159F PR MEDICATION LIST DOCUMENTED IN MEDICAL RECORD: ICD-10-PCS | Mod: CPTII,S$GLB,, | Performed by: SURGERY

## 2023-08-24 PROCEDURE — 99213 OFFICE O/P EST LOW 20 MIN: CPT | Mod: S$GLB,,, | Performed by: SURGERY

## 2023-08-24 PROCEDURE — 3008F PR BODY MASS INDEX (BMI) DOCUMENTED: ICD-10-PCS | Mod: CPTII,S$GLB,, | Performed by: SURGERY

## 2023-08-24 PROCEDURE — 3008F BODY MASS INDEX DOCD: CPT | Mod: CPTII,S$GLB,, | Performed by: SURGERY

## 2023-08-24 PROCEDURE — 99999 PR PBB SHADOW E&M-EST. PATIENT-LVL III: ICD-10-PCS | Mod: PBBFAC,,, | Performed by: SURGERY

## 2023-08-24 PROCEDURE — 1159F MED LIST DOCD IN RCRD: CPT | Mod: CPTII,S$GLB,, | Performed by: SURGERY

## 2023-08-24 PROCEDURE — 99999 PR PBB SHADOW E&M-EST. PATIENT-LVL III: CPT | Mod: PBBFAC,,, | Performed by: SURGERY

## 2023-08-24 PROCEDURE — 3074F SYST BP LT 130 MM HG: CPT | Mod: CPTII,S$GLB,, | Performed by: SURGERY

## 2023-08-24 PROCEDURE — 99213 PR OFFICE/OUTPT VISIT, EST, LEVL III, 20-29 MIN: ICD-10-PCS | Mod: S$GLB,,, | Performed by: SURGERY

## 2023-08-24 PROCEDURE — 3044F PR MOST RECENT HEMOGLOBIN A1C LEVEL <7.0%: ICD-10-PCS | Mod: CPTII,S$GLB,, | Performed by: SURGERY

## 2023-08-24 NOTE — TELEPHONE ENCOUNTER
Called pt to schedule follow-up appt with labs prior. Pt agreed to dates and times of both appts.

## 2023-10-09 ENCOUNTER — HOSPITAL ENCOUNTER (INPATIENT)
Facility: HOSPITAL | Age: 48
LOS: 1 days | Discharge: HOME | DRG: 337 | End: 2023-10-10
Attending: EMERGENCY MEDICINE | Admitting: FAMILY MEDICINE
Payer: COMMERCIAL

## 2023-10-09 ENCOUNTER — ANESTHESIA (OUTPATIENT)
Dept: SURGERY | Facility: HOSPITAL | Age: 48
DRG: 337 | End: 2023-10-09
Payer: COMMERCIAL

## 2023-10-09 ENCOUNTER — ANESTHESIA EVENT (OUTPATIENT)
Dept: SURGERY | Facility: HOSPITAL | Age: 48
DRG: 337 | End: 2023-10-09
Payer: COMMERCIAL

## 2023-10-09 DIAGNOSIS — K56.600 PARTIAL SMALL BOWEL OBSTRUCTION: Primary | ICD-10-CM

## 2023-10-09 DIAGNOSIS — K45.8 INTERNAL HERNIA: ICD-10-CM

## 2023-10-09 LAB
ALBUMIN SERPL BCP-MCNC: 3.8 G/DL (ref 3.5–5.2)
ALP SERPL-CCNC: 60 U/L (ref 55–135)
ALT SERPL W/O P-5'-P-CCNC: 11 U/L (ref 10–44)
ANION GAP SERPL CALC-SCNC: 6 MMOL/L (ref 8–16)
AST SERPL-CCNC: 14 U/L (ref 10–40)
B-HCG UR QL: NEGATIVE
BASOPHILS # BLD AUTO: 0.03 K/UL (ref 0–0.2)
BASOPHILS NFR BLD: 0.6 % (ref 0–1.9)
BILIRUB SERPL-MCNC: 1.1 MG/DL (ref 0.1–1)
BILIRUB UR QL STRIP: NEGATIVE
BNP SERPL-MCNC: 197 PG/ML (ref 0–99)
BUN SERPL-MCNC: 12 MG/DL (ref 6–20)
CALCIUM SERPL-MCNC: 9.2 MG/DL (ref 8.7–10.5)
CHLORIDE SERPL-SCNC: 106 MMOL/L (ref 95–110)
CLARITY UR: ABNORMAL
CO2 SERPL-SCNC: 27 MMOL/L (ref 23–29)
COLOR UR: YELLOW
CREAT SERPL-MCNC: 0.7 MG/DL (ref 0.5–1.4)
CTP QC/QA: YES
DIFFERENTIAL METHOD: ABNORMAL
EOSINOPHIL # BLD AUTO: 0.1 K/UL (ref 0–0.5)
EOSINOPHIL NFR BLD: 0.9 % (ref 0–8)
ERYTHROCYTE [DISTWIDTH] IN BLOOD BY AUTOMATED COUNT: 13.4 % (ref 11.5–14.5)
EST. GFR  (NO RACE VARIABLE): >60 ML/MIN/1.73 M^2
GLUCOSE SERPL-MCNC: 85 MG/DL (ref 70–110)
GLUCOSE UR QL STRIP: NEGATIVE
HCT VFR BLD AUTO: 38.7 % (ref 37–48.5)
HGB BLD-MCNC: 12.9 G/DL (ref 12–16)
HGB UR QL STRIP: NEGATIVE
IMM GRANULOCYTES # BLD AUTO: 0.01 K/UL (ref 0–0.04)
IMM GRANULOCYTES NFR BLD AUTO: 0.2 % (ref 0–0.5)
KETONES UR QL STRIP: NEGATIVE
LEUKOCYTE ESTERASE UR QL STRIP: NEGATIVE
LIPASE SERPL-CCNC: 20 U/L (ref 4–60)
LYMPHOCYTES # BLD AUTO: 1.3 K/UL (ref 1–4.8)
LYMPHOCYTES NFR BLD: 23.3 % (ref 18–48)
MCH RBC QN AUTO: 32.3 PG (ref 27–31)
MCHC RBC AUTO-ENTMCNC: 33.3 G/DL (ref 32–36)
MCV RBC AUTO: 97 FL (ref 82–98)
MONOCYTES # BLD AUTO: 0.5 K/UL (ref 0.3–1)
MONOCYTES NFR BLD: 8.5 % (ref 4–15)
NEUTROPHILS # BLD AUTO: 3.6 K/UL (ref 1.8–7.7)
NEUTROPHILS NFR BLD: 66.5 % (ref 38–73)
NITRITE UR QL STRIP: NEGATIVE
NRBC BLD-RTO: 0 /100 WBC
PH UR STRIP: 6 [PH] (ref 5–8)
PLATELET # BLD AUTO: 226 K/UL (ref 150–450)
PMV BLD AUTO: 11.5 FL (ref 9.2–12.9)
POTASSIUM SERPL-SCNC: 3.7 MMOL/L (ref 3.5–5.1)
PROT SERPL-MCNC: 6.9 G/DL (ref 6–8.4)
PROT UR QL STRIP: ABNORMAL
RBC # BLD AUTO: 4 M/UL (ref 4–5.4)
SODIUM SERPL-SCNC: 139 MMOL/L (ref 136–145)
SP GR UR STRIP: 1.02 (ref 1–1.03)
TROPONIN I SERPL HS-MCNC: 3 PG/ML (ref 0–14.9)
URN SPEC COLLECT METH UR: ABNORMAL
UROBILINOGEN UR STRIP-ACNC: NEGATIVE EU/DL
WBC # BLD AUTO: 5.44 K/UL (ref 3.9–12.7)

## 2023-10-09 PROCEDURE — 81003 URINALYSIS AUTO W/O SCOPE: CPT | Performed by: EMERGENCY MEDICINE

## 2023-10-09 PROCEDURE — 81025 URINE PREGNANCY TEST: CPT | Performed by: EMERGENCY MEDICINE

## 2023-10-09 PROCEDURE — D9220A PRA ANESTHESIA: ICD-10-PCS | Mod: CRNA,,, | Performed by: NURSE ANESTHETIST, CERTIFIED REGISTERED

## 2023-10-09 PROCEDURE — G0378 HOSPITAL OBSERVATION PER HR: HCPCS

## 2023-10-09 PROCEDURE — 63600175 PHARM REV CODE 636 W HCPCS: Performed by: ANESTHESIOLOGY

## 2023-10-09 PROCEDURE — 63600175 PHARM REV CODE 636 W HCPCS: Performed by: SURGERY

## 2023-10-09 PROCEDURE — 96375 TX/PRO/DX INJ NEW DRUG ADDON: CPT

## 2023-10-09 PROCEDURE — 93005 ELECTROCARDIOGRAM TRACING: CPT | Performed by: INTERNAL MEDICINE

## 2023-10-09 PROCEDURE — 93010 ELECTROCARDIOGRAM REPORT: CPT | Mod: ,,, | Performed by: INTERNAL MEDICINE

## 2023-10-09 PROCEDURE — D9220A PRA ANESTHESIA: Mod: CRNA,,, | Performed by: NURSE ANESTHETIST, CERTIFIED REGISTERED

## 2023-10-09 PROCEDURE — 27201423 OPTIME MED/SURG SUP & DEVICES STERILE SUPPLY: Performed by: SURGERY

## 2023-10-09 PROCEDURE — 36000711: Performed by: SURGERY

## 2023-10-09 PROCEDURE — 83690 ASSAY OF LIPASE: CPT | Performed by: EMERGENCY MEDICINE

## 2023-10-09 PROCEDURE — 85025 COMPLETE CBC W/AUTO DIFF WBC: CPT | Performed by: EMERGENCY MEDICINE

## 2023-10-09 PROCEDURE — 27000673 HC TUBING BLOOD Y: Performed by: ANESTHESIOLOGY

## 2023-10-09 PROCEDURE — 25500020 PHARM REV CODE 255: Performed by: EMERGENCY MEDICINE

## 2023-10-09 PROCEDURE — 71000039 HC RECOVERY, EACH ADD'L HOUR: Performed by: SURGERY

## 2023-10-09 PROCEDURE — 63600175 PHARM REV CODE 636 W HCPCS: Performed by: NURSE ANESTHETIST, CERTIFIED REGISTERED

## 2023-10-09 PROCEDURE — 94799 UNLISTED PULMONARY SVC/PX: CPT

## 2023-10-09 PROCEDURE — 27201107 HC STYLET, STANDARD: Performed by: ANESTHESIOLOGY

## 2023-10-09 PROCEDURE — 63600175 PHARM REV CODE 636 W HCPCS: Performed by: EMERGENCY MEDICINE

## 2023-10-09 PROCEDURE — 94761 N-INVAS EAR/PLS OXIMETRY MLT: CPT

## 2023-10-09 PROCEDURE — D9220A PRA ANESTHESIA: ICD-10-PCS | Mod: ANES,,, | Performed by: ANESTHESIOLOGY

## 2023-10-09 PROCEDURE — D9220A PRA ANESTHESIA: Mod: ANES,,, | Performed by: ANESTHESIOLOGY

## 2023-10-09 PROCEDURE — 27202107 HC XP QUATRO SENSOR: Performed by: ANESTHESIOLOGY

## 2023-10-09 PROCEDURE — 25000003 PHARM REV CODE 250: Performed by: NURSE ANESTHETIST, CERTIFIED REGISTERED

## 2023-10-09 PROCEDURE — 37000008 HC ANESTHESIA 1ST 15 MINUTES: Performed by: SURGERY

## 2023-10-09 PROCEDURE — 93010 EKG 12-LEAD: ICD-10-PCS | Mod: ,,, | Performed by: INTERNAL MEDICINE

## 2023-10-09 PROCEDURE — 99900035 HC TECH TIME PER 15 MIN (STAT)

## 2023-10-09 PROCEDURE — 71000033 HC RECOVERY, INTIAL HOUR: Performed by: SURGERY

## 2023-10-09 PROCEDURE — 44180 PR LAP, SURG ENTEROLYSIS: ICD-10-PCS | Mod: ,,, | Performed by: SURGERY

## 2023-10-09 PROCEDURE — 44180 LAP ENTEROLYSIS: CPT | Mod: ,,, | Performed by: SURGERY

## 2023-10-09 PROCEDURE — 84484 ASSAY OF TROPONIN QUANT: CPT | Performed by: EMERGENCY MEDICINE

## 2023-10-09 PROCEDURE — 83880 ASSAY OF NATRIURETIC PEPTIDE: CPT | Performed by: EMERGENCY MEDICINE

## 2023-10-09 PROCEDURE — 25000003 PHARM REV CODE 250: Performed by: SURGERY

## 2023-10-09 PROCEDURE — 99285 EMERGENCY DEPT VISIT HI MDM: CPT | Mod: 25

## 2023-10-09 PROCEDURE — 36415 COLL VENOUS BLD VENIPUNCTURE: CPT | Performed by: EMERGENCY MEDICINE

## 2023-10-09 PROCEDURE — 80053 COMPREHEN METABOLIC PANEL: CPT | Performed by: EMERGENCY MEDICINE

## 2023-10-09 PROCEDURE — 36000710: Performed by: SURGERY

## 2023-10-09 PROCEDURE — 37000009 HC ANESTHESIA EA ADD 15 MINS: Performed by: SURGERY

## 2023-10-09 PROCEDURE — 27202103: Performed by: ANESTHESIOLOGY

## 2023-10-09 RX ORDER — ONDANSETRON 2 MG/ML
8 INJECTION INTRAMUSCULAR; INTRAVENOUS EVERY 6 HOURS PRN
Status: DISCONTINUED | OUTPATIENT
Start: 2023-10-09 | End: 2023-10-10 | Stop reason: HOSPADM

## 2023-10-09 RX ORDER — PROMETHAZINE HYDROCHLORIDE 25 MG/1
25 TABLET ORAL EVERY 6 HOURS PRN
Status: DISCONTINUED | OUTPATIENT
Start: 2023-10-09 | End: 2023-10-10 | Stop reason: HOSPADM

## 2023-10-09 RX ORDER — FAMOTIDINE 10 MG/ML
INJECTION INTRAVENOUS
Status: DISCONTINUED | OUTPATIENT
Start: 2023-10-09 | End: 2023-10-09

## 2023-10-09 RX ORDER — HYDROMORPHONE HYDROCHLORIDE 1 MG/ML
0.2 INJECTION, SOLUTION INTRAMUSCULAR; INTRAVENOUS; SUBCUTANEOUS EVERY 5 MIN PRN
Status: DISCONTINUED | OUTPATIENT
Start: 2023-10-09 | End: 2023-10-09 | Stop reason: HOSPADM

## 2023-10-09 RX ORDER — TALC
6 POWDER (GRAM) TOPICAL NIGHTLY PRN
Status: DISCONTINUED | OUTPATIENT
Start: 2023-10-09 | End: 2023-10-10 | Stop reason: HOSPADM

## 2023-10-09 RX ORDER — ONDANSETRON 2 MG/ML
4 INJECTION INTRAMUSCULAR; INTRAVENOUS EVERY 8 HOURS PRN
Status: DISCONTINUED | OUTPATIENT
Start: 2023-10-09 | End: 2023-10-10 | Stop reason: HOSPADM

## 2023-10-09 RX ORDER — MORPHINE SULFATE 4 MG/ML
4 INJECTION, SOLUTION INTRAMUSCULAR; INTRAVENOUS EVERY 4 HOURS PRN
Status: DISCONTINUED | OUTPATIENT
Start: 2023-10-09 | End: 2023-10-09

## 2023-10-09 RX ORDER — ACETAMINOPHEN 500 MG
1000 TABLET ORAL EVERY 6 HOURS PRN
COMMUNITY

## 2023-10-09 RX ORDER — ONDANSETRON 2 MG/ML
4 INJECTION INTRAMUSCULAR; INTRAVENOUS
Status: COMPLETED | OUTPATIENT
Start: 2023-10-09 | End: 2023-10-09

## 2023-10-09 RX ORDER — SODIUM CHLORIDE, SODIUM LACTATE, POTASSIUM CHLORIDE, CALCIUM CHLORIDE 600; 310; 30; 20 MG/100ML; MG/100ML; MG/100ML; MG/100ML
INJECTION, SOLUTION INTRAVENOUS CONTINUOUS
Status: DISCONTINUED | OUTPATIENT
Start: 2023-10-09 | End: 2023-10-10

## 2023-10-09 RX ORDER — MIDAZOLAM HYDROCHLORIDE 1 MG/ML
INJECTION INTRAMUSCULAR; INTRAVENOUS
Status: DISCONTINUED | OUTPATIENT
Start: 2023-10-09 | End: 2023-10-09

## 2023-10-09 RX ORDER — LIDOCAINE HYDROCHLORIDE 20 MG/ML
INJECTION, SOLUTION EPIDURAL; INFILTRATION; INTRACAUDAL; PERINEURAL
Status: DISCONTINUED | OUTPATIENT
Start: 2023-10-09 | End: 2023-10-09

## 2023-10-09 RX ORDER — POTASSIUM CHLORIDE 7.45 MG/ML
40 INJECTION INTRAVENOUS
Status: DISCONTINUED | OUTPATIENT
Start: 2023-10-09 | End: 2023-10-10 | Stop reason: HOSPADM

## 2023-10-09 RX ORDER — BUPIVACAINE HCL/EPINEPHRINE 0.25-.0005
VIAL (ML) INJECTION
Status: DISCONTINUED | OUTPATIENT
Start: 2023-10-09 | End: 2023-10-09 | Stop reason: HOSPADM

## 2023-10-09 RX ORDER — HYDROMORPHONE HYDROCHLORIDE 1 MG/ML
1 INJECTION, SOLUTION INTRAMUSCULAR; INTRAVENOUS; SUBCUTANEOUS EVERY 6 HOURS PRN
Status: DISCONTINUED | OUTPATIENT
Start: 2023-10-09 | End: 2023-10-10 | Stop reason: HOSPADM

## 2023-10-09 RX ORDER — FENTANYL CITRATE 50 UG/ML
INJECTION, SOLUTION INTRAMUSCULAR; INTRAVENOUS
Status: DISCONTINUED | OUTPATIENT
Start: 2023-10-09 | End: 2023-10-09

## 2023-10-09 RX ORDER — ENOXAPARIN SODIUM 100 MG/ML
40 INJECTION SUBCUTANEOUS EVERY 24 HOURS
Status: DISCONTINUED | OUTPATIENT
Start: 2023-10-10 | End: 2023-10-10 | Stop reason: HOSPADM

## 2023-10-09 RX ORDER — MAGNESIUM SULFATE HEPTAHYDRATE 40 MG/ML
4 INJECTION, SOLUTION INTRAVENOUS
Status: DISCONTINUED | OUTPATIENT
Start: 2023-10-09 | End: 2023-10-10 | Stop reason: HOSPADM

## 2023-10-09 RX ORDER — OXYCODONE HYDROCHLORIDE 5 MG/1
10 TABLET ORAL EVERY 4 HOURS PRN
Status: DISCONTINUED | OUTPATIENT
Start: 2023-10-09 | End: 2023-10-10 | Stop reason: HOSPADM

## 2023-10-09 RX ORDER — CALCIUM GLUCONATE 20 MG/ML
2 INJECTION, SOLUTION INTRAVENOUS
Status: DISCONTINUED | OUTPATIENT
Start: 2023-10-09 | End: 2023-10-10 | Stop reason: HOSPADM

## 2023-10-09 RX ORDER — MAGNESIUM SULFATE HEPTAHYDRATE 40 MG/ML
2 INJECTION, SOLUTION INTRAVENOUS
Status: DISCONTINUED | OUTPATIENT
Start: 2023-10-09 | End: 2023-10-10 | Stop reason: HOSPADM

## 2023-10-09 RX ORDER — ACETAMINOPHEN 325 MG/1
650 TABLET ORAL EVERY 8 HOURS PRN
Status: DISCONTINUED | OUTPATIENT
Start: 2023-10-09 | End: 2023-10-10 | Stop reason: HOSPADM

## 2023-10-09 RX ORDER — ROCURONIUM BROMIDE 10 MG/ML
INJECTION, SOLUTION INTRAVENOUS
Status: DISCONTINUED | OUTPATIENT
Start: 2023-10-09 | End: 2023-10-09

## 2023-10-09 RX ORDER — DEXAMETHASONE SODIUM PHOSPHATE 4 MG/ML
INJECTION, SOLUTION INTRA-ARTICULAR; INTRALESIONAL; INTRAMUSCULAR; INTRAVENOUS; SOFT TISSUE
Status: DISCONTINUED | OUTPATIENT
Start: 2023-10-09 | End: 2023-10-09

## 2023-10-09 RX ORDER — ONDANSETRON 2 MG/ML
INJECTION INTRAMUSCULAR; INTRAVENOUS
Status: DISCONTINUED | OUTPATIENT
Start: 2023-10-09 | End: 2023-10-09

## 2023-10-09 RX ORDER — ACETAMINOPHEN 10 MG/ML
INJECTION, SOLUTION INTRAVENOUS
Status: DISCONTINUED | OUTPATIENT
Start: 2023-10-09 | End: 2023-10-09

## 2023-10-09 RX ORDER — SCOLOPAMINE TRANSDERMAL SYSTEM 1 MG/1
PATCH, EXTENDED RELEASE TRANSDERMAL
Status: DISCONTINUED | OUTPATIENT
Start: 2023-10-09 | End: 2023-10-09

## 2023-10-09 RX ORDER — SODIUM CHLORIDE 9 MG/ML
INJECTION, SOLUTION INTRAVENOUS CONTINUOUS
Status: DISCONTINUED | OUTPATIENT
Start: 2023-10-09 | End: 2023-10-09

## 2023-10-09 RX ORDER — SODIUM CHLORIDE 0.9 % (FLUSH) 0.9 %
2 SYRINGE (ML) INJECTION
Status: DISCONTINUED | OUTPATIENT
Start: 2023-10-09 | End: 2023-10-10 | Stop reason: HOSPADM

## 2023-10-09 RX ORDER — SUCCINYLCHOLINE CHLORIDE 20 MG/ML
INJECTION INTRAMUSCULAR; INTRAVENOUS
Status: DISCONTINUED | OUTPATIENT
Start: 2023-10-09 | End: 2023-10-09

## 2023-10-09 RX ORDER — KETAMINE HYDROCHLORIDE 10 MG/ML
INJECTION, SOLUTION INTRAMUSCULAR; INTRAVENOUS
Status: DISCONTINUED | OUTPATIENT
Start: 2023-10-09 | End: 2023-10-09

## 2023-10-09 RX ORDER — PROPOFOL 10 MG/ML
VIAL (ML) INTRAVENOUS
Status: DISCONTINUED | OUTPATIENT
Start: 2023-10-09 | End: 2023-10-09

## 2023-10-09 RX ORDER — PANTOPRAZOLE SODIUM 40 MG/1
40 TABLET, DELAYED RELEASE ORAL DAILY
Status: DISCONTINUED | OUTPATIENT
Start: 2023-10-10 | End: 2023-10-10 | Stop reason: HOSPADM

## 2023-10-09 RX ORDER — CALCIUM GLUCONATE 20 MG/ML
3 INJECTION, SOLUTION INTRAVENOUS
Status: DISCONTINUED | OUTPATIENT
Start: 2023-10-09 | End: 2023-10-10 | Stop reason: HOSPADM

## 2023-10-09 RX ORDER — MORPHINE SULFATE 4 MG/ML
4 INJECTION, SOLUTION INTRAMUSCULAR; INTRAVENOUS
Status: COMPLETED | OUTPATIENT
Start: 2023-10-09 | End: 2023-10-09

## 2023-10-09 RX ORDER — CALCIUM GLUCONATE 20 MG/ML
1 INJECTION, SOLUTION INTRAVENOUS
Status: DISCONTINUED | OUTPATIENT
Start: 2023-10-09 | End: 2023-10-10 | Stop reason: HOSPADM

## 2023-10-09 RX ORDER — MORPHINE SULFATE 2 MG/ML
2 INJECTION, SOLUTION INTRAMUSCULAR; INTRAVENOUS EVERY 4 HOURS PRN
Status: DISCONTINUED | OUTPATIENT
Start: 2023-10-09 | End: 2023-10-10 | Stop reason: HOSPADM

## 2023-10-09 RX ORDER — ONDANSETRON 2 MG/ML
4 INJECTION INTRAMUSCULAR; INTRAVENOUS DAILY PRN
Status: DISCONTINUED | OUTPATIENT
Start: 2023-10-09 | End: 2023-10-09 | Stop reason: HOSPADM

## 2023-10-09 RX ORDER — CEFAZOLIN SODIUM 2 G/50ML
2 SOLUTION INTRAVENOUS
Status: COMPLETED | OUTPATIENT
Start: 2023-10-10 | End: 2023-10-10

## 2023-10-09 RX ADMIN — KETAMINE HYDROCHLORIDE 20 MG: 10 INJECTION, SOLUTION INTRAMUSCULAR; INTRAVENOUS at 07:10

## 2023-10-09 RX ADMIN — SODIUM CHLORIDE, SODIUM LACTATE, POTASSIUM CHLORIDE, AND CALCIUM CHLORIDE: .6; .31; .03; .02 INJECTION, SOLUTION INTRAVENOUS at 06:10

## 2023-10-09 RX ADMIN — GLYCOPYRROLATE 0.2 MG: 0.2 INJECTION, SOLUTION INTRAMUSCULAR; INTRAVITREAL at 07:10

## 2023-10-09 RX ADMIN — DEXAMETHASONE SODIUM PHOSPHATE 8 MG: 4 INJECTION, SOLUTION INTRAMUSCULAR; INTRAVENOUS at 07:10

## 2023-10-09 RX ADMIN — HYDROMORPHONE HYDROCHLORIDE 0.2 MG: 1 INJECTION, SOLUTION INTRAMUSCULAR; INTRAVENOUS; SUBCUTANEOUS at 08:10

## 2023-10-09 RX ADMIN — SCOPOLAMINE 1 PATCH: 1 PATCH, EXTENDED RELEASE TRANSDERMAL at 07:10

## 2023-10-09 RX ADMIN — ONDANSETRON HYDROCHLORIDE 4 MG: 2 SOLUTION INTRAMUSCULAR; INTRAVENOUS at 02:10

## 2023-10-09 RX ADMIN — ONDANSETRON 4 MG: 2 INJECTION INTRAMUSCULAR; INTRAVENOUS at 07:10

## 2023-10-09 RX ADMIN — ACETAMINOPHEN 1000 MG: 10 INJECTION, SOLUTION INTRAVENOUS at 07:10

## 2023-10-09 RX ADMIN — MORPHINE SULFATE 4 MG: 4 INJECTION, SOLUTION INTRAMUSCULAR; INTRAVENOUS at 02:10

## 2023-10-09 RX ADMIN — IOHEXOL 100 ML: 350 INJECTION, SOLUTION INTRAVENOUS at 03:10

## 2023-10-09 RX ADMIN — LIDOCAINE HYDROCHLORIDE 50 MG: 20 INJECTION, SOLUTION INTRAVENOUS at 07:10

## 2023-10-09 RX ADMIN — DEXTROSE 2 G: 50 INJECTION, SOLUTION INTRAVENOUS at 07:10

## 2023-10-09 RX ADMIN — FENTANYL CITRATE 100 MCG: 50 INJECTION INTRAMUSCULAR; INTRAVENOUS at 07:10

## 2023-10-09 RX ADMIN — ROCURONIUM BROMIDE 5 MG: 10 INJECTION, SOLUTION INTRAVENOUS at 07:10

## 2023-10-09 RX ADMIN — Medication 120 MG: at 07:10

## 2023-10-09 RX ADMIN — SUGAMMADEX 200 MG: 100 INJECTION, SOLUTION INTRAVENOUS at 07:10

## 2023-10-09 RX ADMIN — MIDAZOLAM HYDROCHLORIDE 2 MG: 1 INJECTION, SOLUTION INTRAMUSCULAR; INTRAVENOUS at 06:10

## 2023-10-09 RX ADMIN — ROCURONIUM BROMIDE 25 MG: 10 INJECTION, SOLUTION INTRAVENOUS at 07:10

## 2023-10-09 RX ADMIN — PROPOFOL 150 MG: 10 INJECTION, EMULSION INTRAVENOUS at 07:10

## 2023-10-09 RX ADMIN — FAMOTIDINE 20 MG: 10 INJECTION, SOLUTION INTRAVENOUS at 07:10

## 2023-10-09 RX ADMIN — SODIUM CHLORIDE, SODIUM LACTATE, POTASSIUM CHLORIDE, AND CALCIUM CHLORIDE: .6; .31; .03; .02 INJECTION, SOLUTION INTRAVENOUS at 09:10

## 2023-10-09 NOTE — ANESTHESIA PREPROCEDURE EVALUATION
10/09/2023  Nano White is a 48 y.o., female.      Tobacco Use:  The patient  reports that she quit smoking about a year ago. Her smoking use included cigarettes. She has never used smokeless tobacco.     Results for orders placed or performed during the hospital encounter of 10/09/23   EKG 12-lead    Collection Time: 10/09/23  4:14 PM    Narrative    Test Reason : R10.9,    Vent. Rate : 047 BPM     Atrial Rate : 047 BPM     P-R Int : 120 ms          QRS Dur : 088 ms      QT Int : 466 ms       P-R-T Axes : 056 -13 038 degrees     QTc Int : 412 ms    Sinus bradycardia  Nonspecific ST abnormality  Abnormal ECG  When compared with ECG of 27-APR-2023 13:05,  Non-specific change in ST segment in Inferior leads  T wave inversion no longer evident in Inferior leads    Referred By: [email or web address]   SELF           Confirmed By:         Imaging Results          CT Abdomen Pelvis With Contrast (Final result)  Result time 10/09/23 16:40:49    Final result by Devaughn Case MD (10/09/23 16:40:49)                 Narrative:    CMS MANDATED QUALITY DATA - CT RADIATION - 436    All CT scans at this facility use dose modulation, iterative-reconstruction, and/or weight-based dosing when appropriate to reduce radiation dose to as low as reasonably achievable.      HISTORY:Acute nonlocalized abdominal pain.    TECHNIQUE: Serial axial images were obtained from the domes of the diaphragm to the pubic symphysis with 100ccs Omnipaque 350 intravenous contrast. Oral contrast was not administered for this exam. Coronal and sagittal reconstructions were performed. Patient received 814 mGy-cm of radiation exposure from this exam.    COMPARISON: No previous study available for comparison.    FINDINGS:  Lung bases: Lung bases are free of infiltrate or pleural effusion.    Liver: Liver appears normal in size and contour. Gallbladder  appears nondistended.    Pancreas: Unremarkable.    Spleen: Normal.    Adrenal glands: Unremarkable.    Kidneys and ureters: Bilateral functioning kidneys are noted with normal renal contour and size. No hydronephrosis is seen. Ureters appear nondilated.    Bladder: Bladder is unremarkable.    Bowel: Postop changes of the stomach are present. Findings are most consistent with previous gastric bypass. Multiple dilated loops of small bowel are visualized in the left upper quadrant measuring up to 3.8 cm in diameter with a somewhat swirled appearance noted on the coronal images at the site of multiple surgical clips. Imaging appearance is consistent with at least partial small bowel obstruction which may be secondary to an internal hernia. Distal small bowel appears nondistended. Small volume of fecal material is noted in the nondistended colon.    Peritoneum: Small volume of free fluid is noted in the pelvis. Bilateral ovarian cysts are visualized, largest on the right measuring up to 2 cm in diameter. A linear radiopaque nonmetallic foreign body is visualized in the left aspect of the pelvis measuring 5.2 cm in length and approximately 4 mm in diameter. This radiopaque density is of uncertain etiology. Recommend correlation with patient's surgical history. No free air is seen.    Retroperitoneum: Unremarkable.    Lymph nodes: No evidence of abdominal or pelvic adenopathy is seen.    Abdominal wall: 3 cm subcutaneous cyst is visualized in the left inguinal region suspicious for a sebaceous cyst. Fatty mass is noted along the right pelvic sidewall measuring 9.0 x 5.5 x 4.0 cm in size. Appearance is most suspicious for a lipoma.    Bones: No acute bony abnormality is seen.    IMPRESSION:  1. Dilated small bowel in the left upper quadrant at the site of multiple surgical clips consistent with at least partial small bowel obstruction which may be secondary to a internal hernia.  2. Postop changes of gastric bypass.  3.  Bilateral ovarian cysts.  4. Small volume of pelvic free fluid.  5. 5.2 cm linear radiopaque foreign body in the left aspect of the pelvis of uncertain etiology. Recommend correlation with patient's surgical history.  6. Fatty mass of the right pelvic sidewall likely representing a lipoma.  7. 3 cm subcutaneous cyst in the left inguinal region likely representing a sebaceous cyst.      Electronically signed by:  Devaughn Case MD  10/09/2023 04:40 PM CDT Workstation: [id number]                             X-Ray Chest PA And Lateral (Final result)  Result time 10/09/23 15:23:28    Final result by Brad Monroe MD (10/09/23 15:23:28)                 Narrative:    CLINICAL HISTORY:  48 years (1975) Female Abdominal pain    TECHNIQUE:  PA and lateral radiograph of the chest. Two views.    COMPARISON:  None available.    FINDINGS:  The lungs are clear. Costophrenic angles are seen without effusion. No pneumothorax is identified. The heart is normal in size. The mediastinum is within normal limits. Osseous structures appear within normal limits. The visualized upper abdomen is unremarkable.    IMPRESSION:  No acute cardiac or pulmonary process.                  .            Electronically signed by:  Brad Monroe MD  10/09/2023 03:23 PM CDT Workstation: [id number]                               Lab Results   Component Value Date    WBC 5.44 10/09/2023    HGB 12.9 10/09/2023    HCT 38.7 10/09/2023    MCV 97 10/09/2023     10/09/2023     BMP  Lab Results   Component Value Date     10/09/2023    K 3.7 10/09/2023     10/09/2023    CO2 27 10/09/2023    BUN 12 10/09/2023    CREATININE 0.7 10/09/2023    CALCIUM 9.2 10/09/2023    ANIONGAP 6 (L) 10/09/2023    GLU 85 10/09/2023    GLU 92 05/02/2023    GLU 79 04/27/2023       No results found for this or any previous visit.        Pre-op Assessment    I have reviewed the Patient Summary Reports.     I have reviewed the Nursing Notes. I have  reviewed the NPO Status.   I have reviewed the Medications.     Review of Systems  Anesthesia Hx:  No problems with previous Anesthesia  History of prior surgery of interest to airway management or planning: gastric bypass. Denies Family Hx of Anesthesia complications.   Denies Personal Hx of Anesthesia complications.   Social:  Former Smoker, Alcohol Use    Hematology/Oncology:  Hematology Normal   Oncology Normal     EENT/Dental:EENT/Dental Normal   Cardiovascular:   hyperlipidemia ECG has been reviewed.    Pulmonary:   Sleep Apnea ADELIA resolved with weight loss   Education provided regarding risk of obstructive sleep apnea     Renal/:  Renal/ Normal     Hepatic/GI:  Hepatic/GI Normal H/O gastric banding    H/O Gastric Bypass    Nausea no vomiting  Esophageal / Stomach Disorders Controlled by s/p Gastric Surgery.    Musculoskeletal:   Arthritis     Neurological:  Neurology Normal    Endocrine:  Endocrine Normal  Obesity / BMI > 30  Dermatological:  Skin Normal    Psych:  Psychiatric Normal           Physical Exam  General: Well nourished, Cooperative, Alert and Oriented    Airway:  Mallampati: III   Mouth Opening: Normal  TM Distance: > 6 cm  Tongue: Normal  Neck ROM: Normal ROM    Dental:  Intact, Caps / Implants    Chest/Lungs:  Clear to auscultation, Normal Respiratory Rate    Heart:  Rate: Normal  Rhythm: Regular Rhythm  Sounds: Normal        Anesthesia Plan  Type of Anesthesia, risks & benefits discussed:    Anesthesia Type: Gen ETT  Intra-op Monitoring Plan: Standard ASA Monitors  Post Op Pain Control Plan: multimodal analgesia  Induction:  IV and rapid sequence  Airway Plan: Direct and Video, Post-Induction  Informed Consent: Informed consent signed with the Patient and all parties understand the risks and agree with anesthesia plan.  All questions answered.   ASA Score: 2 Emergent  Anesthesia Plan Notes:     GETA    No Benadryl    Decadron 8 mg, iv Zofran 4 mg iv, Pepcid 20 mg iv, Scopolamine Patch      Ofirmev 1000 mg iv, Ketamine iv    Sugammadex     Ready For Surgery From Anesthesia Perspective.     .

## 2023-10-09 NOTE — ED PROVIDER NOTES
Encounter Date: 10/9/2023       History     Chief Complaint   Patient presents with    Abdominal Pain     LEFT SIDED, X 9 AM THIS AM     This is a 48-year-old female with past medical history of gastric bypass in May comes in complaining of abdominal pain.  Patient reports that the pain started this morning.  It was initially mild and felt like gas pain.  It progressively got worse.  Patient reports that the pain is radiating into her esophagus.  She reports nausea with it but no vomiting.  She denies any associated chest pain or shortness of breath.  No weakness or dizziness.  She denies any history of similar prior pain.  Patient reports that she is had an uncomplicated surgical course since her gastric bypass.      Review of patient's allergies indicates:   Allergen Reactions    Chocolate low lactose Hives     Hives and throat swelling with Chocolate    Benadryl [diphenhydramine hcl] Hives    Phentermine Hives and Itching    Codeine Hives and Nausea And Vomiting     HIVES     Past Medical History:   Diagnosis Date    H/O gastric banding 2014    History of removal of laparoscopic gastric banding device 09/12/2022    Obstructive sleep apnea     S/P gastric surgery 03/09/2016     Past Surgical History:   Procedure Laterality Date    BREAST REDUCTION      BREAST SURGERY      COLONOSCOPY N/A 4/28/2023    Procedure: COLONOSCOPY;  Surgeon: Sharon Cobos MD;  Location: [facility] ENDO;  Service: Endoscopy;  Laterality: N/A;  lm/ppm    KNEE SURGERY Bilateral     SCOPE    LAPAROSCOPIC GASTRIC BANDING      LAPAROSCOPIC REMOVAL OF GASTRIC BAND N/A 08/19/2022    Procedure: REMOVAL, GASTRIC BAND, LAPAROSCOPIC;  Surgeon: Paresh Sampson MD;  Location: [facility] OR;  Service: General;  Laterality: N/A;    XI ROBOTIC GASTROENTEROSTOMY, KENNETH-EN-Y N/A 5/1/2023    Procedure: XI ROBOTIC GASTROENTEROSTOMY, KENNETH-EN-Y;  Surgeon: Paresh Sampson MD;  Location: [facility] OR;  Service: General;  Laterality: N/A;     Family History   Problem  Relation Age of Onset    Heart disease Mother     Diabetes Mother     Obesity Mother     Diabetes Maternal Aunt     Diabetes Maternal Uncle     Diabetes Paternal Aunt     Diabetes Paternal Uncle     Cancer Maternal Grandmother     Cancer Maternal Grandfather     Cancer Paternal Grandmother     Cancer Paternal Grandfather     Obesity Sister     Obesity Sister      Social History     Tobacco Use    Smoking status: Former     Current packs/day: 0.00     Types: Cigarettes     Quit date: 10/18/2022     Years since quittin.9    Smokeless tobacco: Never   Substance Use Topics    Alcohol use: Yes     Comment: Socially    Drug use: Never     Review of Systems   Constitutional:  Negative for chills and fever.   HENT:  Negative for congestion, sore throat and trouble swallowing.    Respiratory:  Negative for cough and shortness of breath.    Cardiovascular:  Negative for chest pain and palpitations.   Gastrointestinal:  Positive for abdominal pain and nausea. Negative for diarrhea and vomiting.   Genitourinary:  Negative for dysuria and flank pain.   Musculoskeletal:  Negative for back pain and neck pain.   Neurological:  Negative for weakness, numbness and headaches.   Psychiatric/Behavioral:  Negative for agitation and confusion.    All other systems reviewed and are negative.      Physical Exam     Initial Vitals [10/09/23 1230]   BP Pulse Resp Temp SpO2   (!) 187/106 (!) 54 18 98.2 °F (36.8 °C) 100 %      MAP       --         Physical Exam    Constitutional: Vital signs are normal. She appears well-developed and well-nourished.  Non-toxic appearance. No distress.   HENT:   Head: Normocephalic and atraumatic.   Mouth/Throat: Oropharynx is clear and moist.   Eyes: Conjunctivae and EOM are normal. Pupils are equal, round, and reactive to light.   Neck: Neck supple.   Normal range of motion.  Cardiovascular:  Normal rate, regular rhythm and intact distal pulses.           Pulmonary/Chest: Breath sounds normal.   Abdominal:  Abdomen is soft. Bowel sounds are normal. There is abdominal tenderness.   Tenderness to palpation left upper quadrant with no rebound or guarding   Musculoskeletal:         General: No tenderness or edema. Normal range of motion.      Cervical back: Normal range of motion and neck supple. No rigidity. No muscular tenderness.     Lymphadenopathy:     She has no cervical adenopathy.     She has no axillary adenopathy.   Neurological: She is alert and oriented to person, place, and time. She has normal strength. No cranial nerve deficit or sensory deficit. Gait normal.   Skin: Skin is warm, dry and intact.   Psychiatric: She has a normal mood and affect. Her behavior is normal.         ED Course   Procedures  Labs Reviewed   CBC W/ AUTO DIFFERENTIAL - Abnormal; Notable for the following components:       Result Value    MCH 32.3 (*)     All other components within normal limits    Narrative:     For upper or mid abdominal pain.   COMPREHENSIVE METABOLIC PANEL - Abnormal; Notable for the following components:    Total Bilirubin 1.1 (*)     Anion Gap 6 (*)     All other components within normal limits    Narrative:     For upper or mid abdominal pain.   URINALYSIS, REFLEX TO URINE CULTURE - Abnormal; Notable for the following components:    Appearance, UA Hazy (*)     Protein, UA Trace (*)     All other components within normal limits    Narrative:     In and Out Cath as needed it patient unable to void  Specimen Source->Urine   B-TYPE NATRIURETIC PEPTIDE - Abnormal; Notable for the following components:     (*)     All other components within normal limits   LIPASE    Narrative:     For upper or mid abdominal pain.   TROPONIN I HIGH SENSITIVITY   B-TYPE NATRIURETIC PEPTIDE   TROPONIN I HIGH SENSITIVITY   POCT URINE PREGNANCY   POCT GLUCOSE MONITORING CONTINUOUS     EKG Readings: (Independently Interpreted)   Time: 1614  Rate: 47 bpm  Sinus bradycardia.  Nonspecific T-wave abnormality.  Unchanged from prior.      ECG Results              EKG 12-lead (In process)  Result time 10/09/23 17:25:21      In process by Interface, Lab In [facility] (10/09/23 17:25:21)                   Narrative:    Test Reason : R10.9,    Vent. Rate : 047 BPM     Atrial Rate : 047 BPM     P-R Int : 120 ms          QRS Dur : 088 ms      QT Int : 466 ms       P-R-T Axes : 056 -13 038 degrees     QTc Int : 412 ms    Sinus bradycardia  Nonspecific ST abnormality  Abnormal ECG  When compared with ECG of 27-APR-2023 13:05,  Non-specific change in ST segment in Inferior leads  T wave inversion no longer evident in Inferior leads    Referred By: [email or web address]   SELF           Confirmed By:                                   Imaging Results              CT Abdomen Pelvis With Contrast (Final result)  Result time 10/09/23 16:40:49      Final result by Devaughn Case MD (10/09/23 16:40:49)                   Narrative:    CMS MANDATED QUALITY DATA - CT RADIATION - 436    All CT scans at this facility use dose modulation, iterative-reconstruction, and/or weight-based dosing when appropriate to reduce radiation dose to as low as reasonably achievable.      HISTORY:Acute nonlocalized abdominal pain.    TECHNIQUE: Serial axial images were obtained from the domes of the diaphragm to the pubic symphysis with 100ccs Omnipaque 350 intravenous contrast. Oral contrast was not administered for this exam. Coronal and sagittal reconstructions were performed. Patient received 814 mGy-cm of radiation exposure from this exam.    COMPARISON: No previous study available for comparison.    FINDINGS:  Lung bases: Lung bases are free of infiltrate or pleural effusion.    Liver: Liver appears normal in size and contour. Gallbladder appears nondistended.    Pancreas: Unremarkable.    Spleen: Normal.    Adrenal glands: Unremarkable.    Kidneys and ureters: Bilateral functioning kidneys are noted with normal renal contour and size. No hydronephrosis is seen. Ureters appear  nondilated.    Bladder: Bladder is unremarkable.    Bowel: Postop changes of the stomach are present. Findings are most consistent with previous gastric bypass. Multiple dilated loops of small bowel are visualized in the left upper quadrant measuring up to 3.8 cm in diameter with a somewhat swirled appearance noted on the coronal images at the site of multiple surgical clips. Imaging appearance is consistent with at least partial small bowel obstruction which may be secondary to an internal hernia. Distal small bowel appears nondistended. Small volume of fecal material is noted in the nondistended colon.    Peritoneum: Small volume of free fluid is noted in the pelvis. Bilateral ovarian cysts are visualized, largest on the right measuring up to 2 cm in diameter. A linear radiopaque nonmetallic foreign body is visualized in the left aspect of the pelvis measuring 5.2 cm in length and approximately 4 mm in diameter. This radiopaque density is of uncertain etiology. Recommend correlation with patient's surgical history. No free air is seen.    Retroperitoneum: Unremarkable.    Lymph nodes: No evidence of abdominal or pelvic adenopathy is seen.    Abdominal wall: 3 cm subcutaneous cyst is visualized in the left inguinal region suspicious for a sebaceous cyst. Fatty mass is noted along the right pelvic sidewall measuring 9.0 x 5.5 x 4.0 cm in size. Appearance is most suspicious for a lipoma.    Bones: No acute bony abnormality is seen.    IMPRESSION:  1. Dilated small bowel in the left upper quadrant at the site of multiple surgical clips consistent with at least partial small bowel obstruction which may be secondary to a internal hernia.  2. Postop changes of gastric bypass.  3. Bilateral ovarian cysts.  4. Small volume of pelvic free fluid.  5. 5.2 cm linear radiopaque foreign body in the left aspect of the pelvis of uncertain etiology. Recommend correlation with patient's surgical history.  6. Fatty mass of the right  pelvic sidewall likely representing a lipoma.  7. 3 cm subcutaneous cyst in the left inguinal region likely representing a sebaceous cyst.      Electronically signed by:  Devaughn Case MD  10/09/2023 04:40 PM CDT Workstation: [id number]                                     X-Ray Chest PA And Lateral (Final result)  Result time 10/09/23 15:23:28      Final result by Brad Monroe MD (10/09/23 15:23:28)                   Narrative:    CLINICAL HISTORY:  48 years (1975) Female Abdominal pain    TECHNIQUE:  PA and lateral radiograph of the chest. Two views.    COMPARISON:  None available.    FINDINGS:  The lungs are clear. Costophrenic angles are seen without effusion. No pneumothorax is identified. The heart is normal in size. The mediastinum is within normal limits. Osseous structures appear within normal limits. The visualized upper abdomen is unremarkable.    IMPRESSION:  No acute cardiac or pulmonary process.                  .            Electronically signed by:  Brad Monroe MD  10/09/2023 03:23 PM CDT Workstation: [id number]                                     Medications   HYDROmorphone injection 0.2 mg (has no administration in time range)   ondansetron injection 4 mg (has no administration in time range)   sodium chloride 0.9% flush 2 mL (has no administration in time range)   melatonin tablet 6 mg (has no administration in time range)   0.9%  NaCl infusion (has no administration in time range)   potassium chloride 10 mEq in 100 mL IVPB (has no administration in time range)     And   potassium chloride 10 mEq in 100 mL IVPB (has no administration in time range)     And   potassium chloride 10 mEq in 100 mL IVPB (has no administration in time range)   magnesium sulfate 2g in water 50mL IVPB (premix) (has no administration in time range)   magnesium sulfate 2g in water 50mL IVPB (premix) (has no administration in time range)   sodium phosphate 15 mmol in dextrose 5 % (D5W) 250 mL IVPB (has no  administration in time range)   sodium phosphate 20.01 mmol in dextrose 5 % (D5W) 250 mL IVPB (has no administration in time range)   sodium phosphate 30 mmol in dextrose 5 % (D5W) 250 mL IVPB (has no administration in time range)   calcium gluconate 1 g in NS IVPB (premixed) (has no administration in time range)   calcium gluconate 1 g in NS IVPB (premixed) (has no administration in time range)   calcium gluconate 1 g in NS IVPB (premixed) (has no administration in time range)   acetaminophen tablet 650 mg (has no administration in time range)   morphine injection 2 mg (has no administration in time range)   morphine injection 4 mg (has no administration in time range)   ondansetron injection 4 mg (has no administration in time range)   promethazine tablet 25 mg (has no administration in time range)   acetaminophen tablet 650 mg (has no administration in time range)   morphine injection 4 mg (4 mg Intravenous Given 10/9/23 1406)   ondansetron injection 4 mg (4 mg Intravenous Given 10/9/23 1406)   iohexoL (OMNIPAQUE 350) injection 100 mL (100 mLs Intravenous Given 10/9/23 1549)     Medical Decision Making  This is a 48-year-old female with history of prior gastric bypass who comes in complaining of abdominal pain and nausea.  On examination patient's vitals are stable but she was initially hypertensive.  On physical exam she has tenderness to palpation left upper quadrant with no rebound or guarding.  Exam is normal otherwise.    Orders included CBC, CMP, lipase, UA, CT abdomen and pelvis.  Patient was given morphine and Zofran.  EKG and chest x-ray ordered.    Comorbidities contributing to patient's presentation include history of prior gastric bypass.  Acute exacerbation of chronic illness: None    I reviewed patient's external medical notes: She was recently evaluated bariatric clinic.  She has lost 30 lb since May 1st.  She is not had any complications.    Differential diagnosis includes small-bowel obstruction,  gastritis, pancreatitis, atypical ACS.    Amount and/or Complexity of Data Reviewed  External Data Reviewed: labs and radiology.  Labs: ordered.     Details: Labs were independently reviewed by me and were unremarkable.  Radiology: ordered and independent interpretation performed.     Details: Chest x-ray was independently reviewed by me and showed no infiltrate or effusion.  CT abdomen and pelvis was reviewed and showed concern for partial small-bowel obstruction with internal hernia.  ECG/medicine tests: ordered and independent interpretation performed. Decision-making details documented in ED Course.  Discussion of management or test interpretation with external provider(s): Case was discussed with Dr. Vigil on-call for Dr. Lu patient's surgeon.  She will be admitted.  On re-evaluation she is hemodynamically stable.  Pain is improved.  She was consulted to the hospitalist for admission.      Risk  Prescription drug management.                               Clinical Impression:   Final diagnoses:  [K56.600] Partial small bowel obstruction (Primary)        ED Disposition Condition    Observation                 Henrietta Schulte MD  10/09/23 0129       Henrietta Schulte MD  10/09/23 [id number]

## 2023-10-10 VITALS
DIASTOLIC BLOOD PRESSURE: 65 MMHG | OXYGEN SATURATION: 98 % | HEIGHT: 65 IN | TEMPERATURE: 99 F | BODY MASS INDEX: 31.82 KG/M2 | HEART RATE: 60 BPM | SYSTOLIC BLOOD PRESSURE: 112 MMHG | WEIGHT: 191 LBS | RESPIRATION RATE: 16 BRPM

## 2023-10-10 PROBLEM — Z98.890: Status: ACTIVE | Noted: 2023-10-10

## 2023-10-10 PROBLEM — S30.851A: Chronic | Status: ACTIVE | Noted: 2023-10-10

## 2023-10-10 PROBLEM — E66.9 CLASS 1 OBESITY IN ADULT: Chronic | Status: ACTIVE | Noted: 2023-10-10

## 2023-10-10 PROBLEM — E66.811 CLASS 1 OBESITY IN ADULT: Chronic | Status: ACTIVE | Noted: 2023-10-10

## 2023-10-10 PROBLEM — Z98.84 HISTORY OF GASTRIC BYPASS: Chronic | Status: ACTIVE | Noted: 2023-10-10

## 2023-10-10 PROBLEM — K56.600 SMALL BOWEL OBSTRUCTION, PARTIAL: Status: RESOLVED | Noted: 2023-10-09 | Resolved: 2023-10-10

## 2023-10-10 LAB
ANION GAP SERPL CALC-SCNC: 8 MMOL/L (ref 8–16)
BASOPHILS # BLD AUTO: 0.01 K/UL (ref 0–0.2)
BASOPHILS NFR BLD: 0.1 % (ref 0–1.9)
BUN SERPL-MCNC: 12 MG/DL (ref 6–20)
CALCIUM SERPL-MCNC: 8.7 MG/DL (ref 8.7–10.5)
CHLORIDE SERPL-SCNC: 104 MMOL/L (ref 95–110)
CO2 SERPL-SCNC: 24 MMOL/L (ref 23–29)
CREAT SERPL-MCNC: 0.6 MG/DL (ref 0.5–1.4)
DIFFERENTIAL METHOD: ABNORMAL
EOSINOPHIL # BLD AUTO: 0 K/UL (ref 0–0.5)
EOSINOPHIL NFR BLD: 0 % (ref 0–8)
ERYTHROCYTE [DISTWIDTH] IN BLOOD BY AUTOMATED COUNT: 13 % (ref 11.5–14.5)
EST. GFR  (NO RACE VARIABLE): >60 ML/MIN/1.73 M^2
GLUCOSE SERPL-MCNC: 132 MG/DL (ref 70–110)
HCT VFR BLD AUTO: 39.1 % (ref 37–48.5)
HGB BLD-MCNC: 13 G/DL (ref 12–16)
IMM GRANULOCYTES # BLD AUTO: 0.03 K/UL (ref 0–0.04)
IMM GRANULOCYTES NFR BLD AUTO: 0.4 % (ref 0–0.5)
LYMPHOCYTES # BLD AUTO: 0.5 K/UL (ref 1–4.8)
LYMPHOCYTES NFR BLD: 6.6 % (ref 18–48)
MAGNESIUM SERPL-MCNC: 1.6 MG/DL (ref 1.6–2.6)
MCH RBC QN AUTO: 32.3 PG (ref 27–31)
MCHC RBC AUTO-ENTMCNC: 33.2 G/DL (ref 32–36)
MCV RBC AUTO: 97 FL (ref 82–98)
MONOCYTES # BLD AUTO: 0.1 K/UL (ref 0.3–1)
MONOCYTES NFR BLD: 1.1 % (ref 4–15)
NEUTROPHILS # BLD AUTO: 6.5 K/UL (ref 1.8–7.7)
NEUTROPHILS NFR BLD: 91.8 % (ref 38–73)
NRBC BLD-RTO: 0 /100 WBC
PHOSPHATE SERPL-MCNC: 4.3 MG/DL (ref 2.7–4.5)
PLATELET # BLD AUTO: 193 K/UL (ref 150–450)
PMV BLD AUTO: 11.8 FL (ref 9.2–12.9)
POTASSIUM SERPL-SCNC: 3.9 MMOL/L (ref 3.5–5.1)
RBC # BLD AUTO: 4.03 M/UL (ref 4–5.4)
SODIUM SERPL-SCNC: 136 MMOL/L (ref 136–145)
WBC # BLD AUTO: 7.11 K/UL (ref 3.9–12.7)

## 2023-10-10 PROCEDURE — 36415 COLL VENOUS BLD VENIPUNCTURE: CPT | Performed by: SURGERY

## 2023-10-10 PROCEDURE — 84100 ASSAY OF PHOSPHORUS: CPT | Performed by: SURGERY

## 2023-10-10 PROCEDURE — 12000002 HC ACUTE/MED SURGE SEMI-PRIVATE ROOM

## 2023-10-10 PROCEDURE — 63600175 PHARM REV CODE 636 W HCPCS: Performed by: SURGERY

## 2023-10-10 PROCEDURE — 85025 COMPLETE CBC W/AUTO DIFF WBC: CPT | Performed by: SURGERY

## 2023-10-10 PROCEDURE — 83735 ASSAY OF MAGNESIUM: CPT | Performed by: SURGERY

## 2023-10-10 PROCEDURE — 80048 BASIC METABOLIC PNL TOTAL CA: CPT | Performed by: SURGERY

## 2023-10-10 PROCEDURE — 96365 THER/PROPH/DIAG IV INF INIT: CPT

## 2023-10-10 PROCEDURE — 25000003 PHARM REV CODE 250: Performed by: SURGERY

## 2023-10-10 RX ORDER — HYDROCODONE BITARTRATE AND ACETAMINOPHEN 5; 325 MG/1; MG/1
1 TABLET ORAL EVERY 8 HOURS PRN
Qty: 15 TABLET | Refills: 0 | Status: SHIPPED | OUTPATIENT
Start: 2023-10-10 | End: 2023-10-15

## 2023-10-10 RX ADMIN — CEFAZOLIN SODIUM 2 G: 2 SOLUTION INTRAVENOUS at 08:10

## 2023-10-10 RX ADMIN — OXYCODONE HYDROCHLORIDE 10 MG: 5 TABLET ORAL at 09:10

## 2023-10-10 RX ADMIN — CEFAZOLIN SODIUM 2 G: 2 SOLUTION INTRAVENOUS at 01:10

## 2023-10-10 RX ADMIN — PANTOPRAZOLE SODIUM 40 MG: 40 TABLET, DELAYED RELEASE ORAL at 08:10

## 2023-10-10 RX ADMIN — CEFAZOLIN SODIUM 2 G: 2 SOLUTION INTRAVENOUS at 12:10

## 2023-10-10 NOTE — HOSPITAL COURSE
Patient with history of prior gastric banding followed by gastric bypass who presented with abdominal pain.  CT on presentation with concern for small-bowel obstruction secondary to internal hernia with 5.3 cm abnormality in pelvis.  She was seen by Dr. Sampson and taken to the OR for exploratory laparotomy, intraoperative findings of small-bowel obstruction secondary to adhesion, foreign body in left pelvis, removed, placed on medical floor postoperatively. On 10/10 states feels improved, no nausea, vomiting or significant abdominal pain, tolerating regular diet, states she passed flatus.  Communicated with general surgery, cleared for discharge with outpatient follow-up.  Patient is eager for discharge.  Discharge plan including medication, follow-up as well as return precautions were reviewed with patient, she expressed understanding, no questions or concerns.  Discussed with nursing.    Discharge examination   Sitting side of bed, alert, oriented, on room air, laparoscopic incisions well approximated, soft, bowel sounds heard

## 2023-10-10 NOTE — H&P
"[facility] Medicine  History & Physical    Patient Name: Nano White  MRN: [id number]  Patient Class: OP- Observation  Admission Date: 10/9/2023  Attending Physician: Dr. Garcia  Primary Care Provider: Zora, Primary Doctor         Patient information was obtained from patient, spouse/SO and ER records.     Subjective:     Principal Problem:Small bowel obstruction, partial    Chief Complaint:   Chief Complaint   Patient presents with    Abdominal Pain     LEFT SIDED, X 9 AM THIS AM        HPI: 47 y/o female with history of gatric bypass surgery in May presented to the ED after having abdominal pain starting around 0900 this morning. Per ED doctor patient states that she thought it was gas pain at first however it continued to get worse until she was doubled over in pain that she said radiated to her esophagus, which is when she decided to come to the ED. ED provider states that the patient was experiencing nausea but denies any vomiting, chest pain, SOB, weakness, or dizziness.     I saw the patient once she was placed in her hospital room after she got surgery. Patient told me that this morning she got up, went to the gym, got home and showered and was at home drinking coffee when her LUQ started to hurt. She originally thought that it was gas but states that within 20 minutes she was "curled up on the couch in the fetal position" and decided 2 hours after the pain had started that she needed to come to the hospital. After surgery the patient states that she feels abdominal soreness, from the surgery, but denies any other symptoms at this time.     ED: Vitals showed HR 54, /106, RR 18 saturating at 100 % on RA. After receiving Morphine for pain control in the ED her vitals improved to HR 51 with /77. Labs were significant for BNP of 197. CT abdomen pelvis showed dilated small bowel in the left upper quadrant at the site of multiple surgical clips consistent with at least " partial small bowel obstruction. CXR showed no acute cardiopulmonary changes.       Past Medical History:   Diagnosis Date    H/O gastric banding 2014    History of removal of laparoscopic gastric banding device 09/12/2022    Obstructive sleep apnea     S/P gastric surgery 03/09/2016       Past Surgical History:   Procedure Laterality Date    BREAST REDUCTION      BREAST SURGERY      COLONOSCOPY N/A 4/28/2023    Procedure: COLONOSCOPY;  Surgeon: Sharon Cobos MD;  Location: [facility] ENDO;  Service: Endoscopy;  Laterality: N/A;  lm/ppm    KNEE SURGERY Bilateral     SCOPE    LAPAROSCOPIC GASTRIC BANDING      LAPAROSCOPIC REMOVAL OF GASTRIC BAND N/A 08/19/2022    Procedure: REMOVAL, GASTRIC BAND, LAPAROSCOPIC;  Surgeon: Paresh Sampson MD;  Location: [facility] OR;  Service: General;  Laterality: N/A;    XI ROBOTIC GASTROENTEROSTOMY, KENNETH-EN-Y N/A 5/1/2023    Procedure: XI ROBOTIC GASTROENTEROSTOMY, KENNETH-EN-Y;  Surgeon: Paresh Sampson MD;  Location: [facility] OR;  Service: General;  Laterality: N/A;       Review of patient's allergies indicates:   Allergen Reactions    Chocolate low lactose Hives     Hives and throat swelling with Chocolate    Benadryl [diphenhydramine hcl] Hives    Phentermine Hives and Itching    Codeine Hives and Nausea And Vomiting     HIVES       No current facility-administered medications on file prior to encounter.     Current Outpatient Medications on File Prior to Encounter   Medication Sig    acetaminophen (TYLENOL EXTRA STRENGTH) 500 MG tablet Take 1,000 mg by mouth every 6 (six) hours as needed for Pain.    multivit-min/iron/folic acid/K (BARIATRIC MULTIVITAMINS ORAL) Take 1 capsule by mouth Daily.    ondansetron (ZOFRAN-ODT) 4 MG TbDL Take 1 tablet (4 mg total) by mouth every 6 (six) hours as needed (nv).     Family History       Problem Relation (Age of Onset)    Cancer Maternal Grandmother, Maternal Grandfather, Paternal Grandmother, Paternal Grandfather    Diabetes  Mother, Maternal Aunt, Maternal Uncle, Paternal Aunt, Paternal Uncle    Heart disease Mother    Obesity Mother, Sister, Sister          Tobacco Use    Smoking status: Former     Current packs/day: 0.00     Types: Cigarettes     Quit date: 10/18/2022     Years since quittin.9    Smokeless tobacco: Never   Substance and Sexual Activity    Alcohol use: Yes     Comment: Socially    Drug use: Never    Sexual activity: Yes     Partners: Male     Review of Systems   Constitutional:  Negative for activity change, appetite change, chills, diaphoresis, fatigue and fever.   HENT:  Negative for congestion, ear discharge, ear pain, postnasal drip, rhinorrhea, sinus pressure and sore throat.    Eyes:  Negative for pain, discharge, redness and itching.   Respiratory:  Negative for cough, chest tightness and shortness of breath.    Cardiovascular:  Negative for chest pain and leg swelling.   Gastrointestinal:  Positive for abdominal pain (in the LUQ that has subsided since surgery) and nausea (that has now subsided). Negative for constipation, diarrhea and vomiting.        Currently has abdominal soreness   Genitourinary:  Negative for dysuria, frequency, hematuria and urgency.   Musculoskeletal:  Negative for back pain.   Skin:  Negative for color change, pallor and rash.   Neurological:  Negative for dizziness, syncope, facial asymmetry, weakness, light-headedness and headaches.   Psychiatric/Behavioral:  Negative for behavioral problems, confusion and hallucinations.      Objective:     Vital Signs (Most Recent):  Temp: 97.6 °F (36.4 °C) (10/09/23 2115)  Pulse: (!) 50 (10/09/23 2115)  Resp: 18 (10/09/23 2115)  BP: (!) 154/83 (10/09/23 2115)  SpO2: 100 % (10/09/23 2115) Vital Signs (24h Range):  Temp:  [97.3 °F (36.3 °C)-98.2 °F (36.8 °C)] 97.6 °F (36.4 °C)  Pulse:  [50-96] 50  Resp:  [11-20] 18  SpO2:  [95 %-100 %] 100 %  BP: (125-187)/() 154/83     Weight: 86.6 kg (191 lb)  Body mass index is 31.78 kg/m².      Physical Exam  Vitals and nursing note reviewed.   Constitutional:       General: She is not in acute distress.     Appearance: Normal appearance. She is not ill-appearing, toxic-appearing or diaphoretic.   HENT:      Head: Normocephalic and atraumatic.      Nose: Nose normal.      Mouth/Throat:      Mouth: Mucous membranes are moist.   Eyes:      Extraocular Movements: Extraocular movements intact.   Cardiovascular:      Rate and Rhythm: Normal rate and regular rhythm.      Heart sounds: Normal heart sounds. No murmur heard.  Pulmonary:      Effort: Pulmonary effort is normal. No respiratory distress.      Breath sounds: Normal breath sounds. No wheezing.   Abdominal:      General: Abdomen is flat. Bowel sounds are normal.      Palpations: Abdomen is soft. There is no mass.      Tenderness: There is abdominal tenderness (postoperatively around incisions). There is no guarding.      Hernia: No hernia is present.      Comments: Surgical sites look clean dry and intact   Musculoskeletal:         General: No swelling, tenderness or deformity. Normal range of motion.      Cervical back: Normal range of motion. No rigidity or tenderness.   Skin:     General: Skin is warm and dry.      Coloration: Skin is not jaundiced.      Findings: No bruising or erythema.   Neurological:      General: No focal deficit present.      Mental Status: She is alert and oriented to person, place, and time. Mental status is at baseline.   Psychiatric:         Mood and Affect: Mood normal.         Behavior: Behavior normal.                Significant Labs: All pertinent labs within the past 24 hours have been reviewed.  CBC:   Recent Labs   Lab 10/09/23  1319   WBC 5.44   HGB 12.9   HCT 38.7        CMP:   Recent Labs   Lab 10/09/23  1319      K 3.7      CO2 27   GLU 85   BUN 12   CREATININE 0.7   CALCIUM 9.2   PROT 6.9   ALBUMIN 3.8   BILITOT 1.1*   ALKPHOS 60   AST 14   ALT 11   ANIONGAP 6*     Cardiac Markers:   Recent  Labs   Lab 10/09/23  1319   *     Lipase:   Recent Labs   Lab 10/09/23  1319   LIPASE 20     Troponin:   Recent Labs   Lab 10/09/23  1319   TROPONINIHS 3.0       Significant Imaging: I have reviewed all pertinent imaging results/findings within the past 24 hours.    Assessment/Plan:     * Small bowel obstruction, partial  Patient came to the ED for intense LUQ abdominal pain for 2 hours prior to arrival to the ED  CT Abdomen Pelvis   - Dilated small bowel in the left upper quadrant at the site of multiple surgical clips consistent with at least partial small bowel obstruction which may be secondary to a internal hernia.  - Postop changes of gastric bypass.  - Bilateral ovarian cysts.  - Small volume of pelvic free fluid.  - 5.2 cm linear radiopaque foreign body in the left aspect of the pelvis of uncertain etiology. Recommend correlation with patient's surgical history.  - Fatty mass of the right pelvic sidewall likely representing a lipoma.  - 3 cm subcutaneous cyst in the left inguinal region likely representing a sebaceous cyst.    Dr Sampson was consulted   - Brought patient to Diagnostic laparoscopy    Continue to monitor patient         VTE Risk Mitigation (From admission, onward)         Ordered     enoxaparin injection 40 mg  Daily         10/09/23 2040     IP VTE HIGH RISK PATIENT  Once         10/09/23 2040     Place WILIAM hose  Until discontinued         10/09/23 2040     Place sequential compression device  Until discontinued         10/09/23 2040     Place sequential compression device  Until discontinued         10/09/23 1854                     On 10/09/2023, patient should be placed in hospital observation services under my care in collaboration with Dr. Garcia.      Marissa Davenport PA-C  Department of Hospital Medicine  [facility]

## 2023-10-10 NOTE — SUBJECTIVE & OBJECTIVE
Interval History:  Patient denies any significant abdominal pain, no nausea, vomiting, tolerating regular diet, states she passed flatus twice this morning.  Does report chronic sciatica pain which she believes is from lying in bed.  Using incentive spirometer and getting volumes of 2000 cc.  Afebrile, on room air, labs with hemoglobin 13, BUN/creatinine 12/0.6.  Discussed with patient, eager for discharge.  Communicated with general surgery.    Review of Systems   Constitutional:  Negative for fever.   Respiratory:  Negative for shortness of breath.    Cardiovascular:  Negative for chest pain.   Gastrointestinal:  Negative for nausea and vomiting.   Psychiatric/Behavioral:  Negative for confusion.      Objective:     Vital Signs (Most Recent):  Temp: 98.8 °F (37.1 °C) (10/10/23 0827)  Pulse: 60 (10/10/23 0827)  Resp: 16 (10/10/23 0911)  BP: 112/65 (10/10/23 0827)  SpO2: 98 % (10/10/23 0827) Vital Signs (24h Range):  Temp:  [97.3 °F (36.3 °C)-98.8 °F (37.1 °C)] 98.8 °F (37.1 °C)  Pulse:  [50-96] 60  Resp:  [11-20] 16  SpO2:  [95 %-100 %] 98 %  BP: (112-187)/() 112/65     Weight: 86.6 kg (191 lb)  Body mass index is 31.78 kg/m².    Intake/Output Summary (Last 24 hours) at 10/10/2023 0913  Last data filed at 10/9/2023 1953  Gross per 24 hour   Intake 950 ml   Output 100 ml   Net 850 ml         Physical Exam  Vitals and nursing note reviewed.   Constitutional:       General: She is not in acute distress.     Appearance: She is not ill-appearing.      Comments: Lying in bed on her right side   HENT:      Head: Normocephalic and atraumatic.      Mouth/Throat:      Mouth: Mucous membranes are moist.   Cardiovascular:      Rate and Rhythm: Normal rate and regular rhythm.   Pulmonary:      Comments: On room air  Abdominal:      General: Bowel sounds are normal.      Palpations: Abdomen is soft.   Musculoskeletal:      Cervical back: Neck supple.   Skin:     General: Skin is warm.      Comments: Multiple tattoos  "present   Neurological:      Mental Status: She is alert and oriented to person, place, and time.   Psychiatric:         Mood and Affect: Mood normal.             Significant Labs: BMP:   Recent Labs   Lab 10/10/23  0504   *      K 3.9      CO2 24   BUN 12   CREATININE 0.6   CALCIUM 8.7   MG 1.6     CBC:   Recent Labs   Lab 10/09/23  1319 10/10/23  0504   WBC 5.44 7.11   HGB 12.9 13.0   HCT 38.7 39.1    193     CMP:   Recent Labs   Lab 10/09/23  1319 10/10/23  0504    136   K 3.7 3.9    104   CO2 27 24   GLU 85 132*   BUN 12 12   CREATININE 0.7 0.6   CALCIUM 9.2 8.7   PROT 6.9  --    ALBUMIN 3.8  --    BILITOT 1.1*  --    ALKPHOS 60  --    AST 14  --    ALT 11  --    ANIONGAP 6* 8     Cardiac Markers:   Recent Labs   Lab 10/09/23  1319   *     Lactic Acid: No results for input(s): "LACTATE" in the last 48 hours.  Magnesium:   Recent Labs   Lab 10/10/23  0504   MG 1.6       Significant Imaging: I have reviewed all pertinent imaging results/findings within the past 24 hours.  "

## 2023-10-10 NOTE — PLAN OF CARE
Camilla follow up added to avs       10/10/23 1000   Post-Acute Status   Post-Acute Authorization Other   Other Status No Post-Acute Service Needs

## 2023-10-10 NOTE — HPI
48-year-old female with gastric bypass earlier this year presents with 24 hours worth of abdominal pain described as general discomfort starting today.  Much worse earlier today has gotten better since admission to the ER.  Was unable to eat and felt bloated.

## 2023-10-10 NOTE — OP NOTE
Diagnostic laparoscopy   Lysis of adhesions causing bowel obstruction  Procedure Note    Date of procedure:   10/09/2023    Indications: 49 y/o hx of gastric bypass with abdominal pain and obstruction here for emergent surgery.    Pre-operative Diagnosis: bowel obstruction    Post-operative Diagnosis: Same    Surgeon: Paresh Sampson MD    Assistants: none    Anesthesia: General endotracheal anesthesia    ASA Class: 3    Procedure Details   The patient was seen in the Holding Room. The risks, benefits, complications, treatment options, and expected outcomes were discussed with the patient. The possibilities of reaction to medication, pulmonary aspiration, perforation of viscus, bleeding, recurrent infection, the need for additional procedures, failure to diagnose a condition, and creating a complication requiring transfusion or operation were discussed with the patient. The patient concurred with the proposed plan, giving informed consent. The site of surgery properly noted/marked. The patient was taken to Operating Room identified as Nano hWite and the procedure verified as diagnostic laparoscopy. A Time Out was held and the above information confirmed.    Full general anesthesia was induced with orotracheal intubation. The patient was prepped and draped in a supine position. Appropriate antibiotics were given intravenously. Arms were tucked.      Local anesthetic agent was injected into the skin near the right upper quadrant and an incision made. 5 mm optiview trocar was used to enter safely into the peritoneal cavity.  Pneumoperitoneum was then created with CO2 and tolerated well without any adverse changes in the patient's vital signs. Additional trocars were introduced under direct vision. All skin incisions were infiltrated with a local anesthetic agent before making the incision and placing the trocars.     Immediately noted in the left upper quadrant were dilated bowel.  And after superficially looking  at the rest of the abdomen it appeared that this was the point of obstruction.  It appeared that the bowel had adhered to where her previous band port had been and then twisted around this adhesion.  There also appeared to be in obvious uterine fibroid.  Both of these were photographed.  Using scissors took down the adhesions to the abdominal wall which were tethering the bowel and twisted manner causing the obstruction.  Both of these adhesions came down very easily with scissors and immediately untwisted the bowel.  When this was done it appeared that the obstruction quickly resolved.  The bowel did not appear to be within the internal hernia defects.  There was good closure of the retro Crystal defect.  The JJ defect did appear open very slightly.  I closed the remaining defect using a silk suture.  The old suture appeared to be stuck to the mesentery and was removed.  I then placed the patient in Trendelenburg very slightly and examined further into the left pelvis.  This was given the CT findings.  Deep within the left pelvis without any adhesions was a plastic foreign body.  This was non tubular, it measured approximately 4-5 cm and was flattened on 1 end and had a hole on the other.  This matched the findings seen on the CT scan.  This was completely removed.  This had the appearance of what is used to place a lap band initially.  No further abnormality seen.    CO2 was stopped and insufflation removed.  Skin was then closed with Monocryl and dressings were placed.    Instrument, sponge, and needle counts were correct prior to wound closure and at the conclusion of the case.     Findings:  small bowel obstruction from adhesions, foreign body    Estimated Blood Loss: 20.0 cc    Drains: none    Total IV Fluids: see anesthesia    Specimens: none    Implants: none    Complications:  None; patient tolerated the procedure well.    Disposition: PACU - hemodynamically stable.    Condition: stable    Attending  Attestation: I was present and scrubbed for the entire procedure.

## 2023-10-10 NOTE — SUBJECTIVE & OBJECTIVE
Past Medical History:   Diagnosis Date    H/O gastric banding 2014    History of removal of laparoscopic gastric banding device 09/12/2022    Obstructive sleep apnea     S/P gastric surgery 03/09/2016       Past Surgical History:   Procedure Laterality Date    BREAST REDUCTION      BREAST SURGERY      COLONOSCOPY N/A 4/28/2023    Procedure: COLONOSCOPY;  Surgeon: Sharon Cobos MD;  Location: [facility] ENDO;  Service: Endoscopy;  Laterality: N/A;  lm/ppm    KNEE SURGERY Bilateral     SCOPE    LAPAROSCOPIC GASTRIC BANDING      LAPAROSCOPIC REMOVAL OF GASTRIC BAND N/A 08/19/2022    Procedure: REMOVAL, GASTRIC BAND, LAPAROSCOPIC;  Surgeon: Paresh Sampson MD;  Location: [facility] OR;  Service: General;  Laterality: N/A;    XI ROBOTIC GASTROENTEROSTOMY, KENNETH-EN-Y N/A 5/1/2023    Procedure: XI ROBOTIC GASTROENTEROSTOMY, KENNETH-EN-Y;  Surgeon: Paresh Sampson MD;  Location: [facility] OR;  Service: General;  Laterality: N/A;       Review of patient's allergies indicates:   Allergen Reactions    Chocolate low lactose Hives     Hives and throat swelling with Chocolate    Benadryl [diphenhydramine hcl] Hives    Phentermine Hives and Itching    Codeine Hives and Nausea And Vomiting     HIVES       No current facility-administered medications on file prior to encounter.     Current Outpatient Medications on File Prior to Encounter   Medication Sig    acetaminophen (TYLENOL EXTRA STRENGTH) 500 MG tablet Take 1,000 mg by mouth every 6 (six) hours as needed for Pain.    multivit-min/iron/folic acid/K (BARIATRIC MULTIVITAMINS ORAL) Take 1 capsule by mouth Daily.    ondansetron (ZOFRAN-ODT) 4 MG TbDL Take 1 tablet (4 mg total) by mouth every 6 (six) hours as needed (nv).     Family History       Problem Relation (Age of Onset)    Cancer Maternal Grandmother, Maternal Grandfather, Paternal Grandmother, Paternal Grandfather    Diabetes Mother, Maternal Aunt, Maternal Uncle, Paternal Aunt, Paternal Uncle    Heart disease Mother    Obesity  Mother, Sister, Sister          Tobacco Use    Smoking status: Former     Current packs/day: 0.00     Types: Cigarettes     Quit date: 10/18/2022     Years since quittin.9    Smokeless tobacco: Never   Substance and Sexual Activity    Alcohol use: Yes     Comment: Socially    Drug use: Never    Sexual activity: Yes     Partners: Male     Review of Systems   Constitutional:  Negative for activity change, appetite change, chills, diaphoresis, fatigue and fever.   HENT:  Negative for congestion, ear discharge, ear pain, postnasal drip, rhinorrhea, sinus pressure and sore throat.    Eyes:  Negative for pain, discharge, redness and itching.   Respiratory:  Negative for cough, chest tightness and shortness of breath.    Cardiovascular:  Negative for chest pain and leg swelling.   Gastrointestinal:  Positive for abdominal pain (in the LUQ that has subsided since surgery) and nausea (that has now subsided). Negative for constipation, diarrhea and vomiting.        Currently has abdominal soreness   Genitourinary:  Negative for dysuria, frequency, hematuria and urgency.   Musculoskeletal:  Negative for back pain.   Skin:  Negative for color change, pallor and rash.   Neurological:  Negative for dizziness, syncope, facial asymmetry, weakness, light-headedness and headaches.   Psychiatric/Behavioral:  Negative for behavioral problems, confusion and hallucinations.      Objective:     Vital Signs (Most Recent):  Temp: 97.6 °F (36.4 °C) (10/09/23 2115)  Pulse: (!) 50 (10/09/23 2115)  Resp: 18 (10/09/23 2115)  BP: (!) 154/83 (10/09/23 2115)  SpO2: 100 % (10/09/23 2115) Vital Signs (24h Range):  Temp:  [97.3 °F (36.3 °C)-98.2 °F (36.8 °C)] 97.6 °F (36.4 °C)  Pulse:  [50-96] 50  Resp:  [11-20] 18  SpO2:  [95 %-100 %] 100 %  BP: (125-187)/() 154/83     Weight: 86.6 kg (191 lb)  Body mass index is 31.78 kg/m².     Physical Exam  Vitals and nursing note reviewed.   Constitutional:       General: She is not in acute  distress.     Appearance: Normal appearance. She is not ill-appearing, toxic-appearing or diaphoretic.   HENT:      Head: Normocephalic and atraumatic.      Nose: Nose normal.      Mouth/Throat:      Mouth: Mucous membranes are moist.   Eyes:      Extraocular Movements: Extraocular movements intact.   Cardiovascular:      Rate and Rhythm: Normal rate and regular rhythm.      Heart sounds: Normal heart sounds. No murmur heard.  Pulmonary:      Effort: Pulmonary effort is normal. No respiratory distress.      Breath sounds: Normal breath sounds. No wheezing.   Abdominal:      General: Abdomen is flat. Bowel sounds are normal.      Palpations: Abdomen is soft. There is no mass.      Tenderness: There is abdominal tenderness (postoperatively around incisions). There is no guarding.      Hernia: No hernia is present.      Comments: Surgical sites look clean dry and intact   Musculoskeletal:         General: No swelling, tenderness or deformity. Normal range of motion.      Cervical back: Normal range of motion. No rigidity or tenderness.   Skin:     General: Skin is warm and dry.      Coloration: Skin is not jaundiced.      Findings: No bruising or erythema.   Neurological:      General: No focal deficit present.      Mental Status: She is alert and oriented to person, place, and time. Mental status is at baseline.   Psychiatric:         Mood and Affect: Mood normal.         Behavior: Behavior normal.                Significant Labs: All pertinent labs within the past 24 hours have been reviewed.  CBC:   Recent Labs   Lab 10/09/23  1319   WBC 5.44   HGB 12.9   HCT 38.7        CMP:   Recent Labs   Lab 10/09/23  1319      K 3.7      CO2 27   GLU 85   BUN 12   CREATININE 0.7   CALCIUM 9.2   PROT 6.9   ALBUMIN 3.8   BILITOT 1.1*   ALKPHOS 60   AST 14   ALT 11   ANIONGAP 6*     Cardiac Markers:   Recent Labs   Lab 10/09/23  1319   *     Lipase:   Recent Labs   Lab 10/09/23  1319   LIPASE 20      Troponin:   Recent Labs   Lab 10/09/23  1319   TROPONINIHS 3.0       Significant Imaging: I have reviewed all pertinent imaging results/findings within the past 24 hours.

## 2023-10-10 NOTE — PLAN OF CARE
Discharge orders and chart reviewed. No other discharge needs noted at this time. Pt is clear for discharge from case management. Pt is discharging to home.    Surgery follow up added to avs       10/10/23 1258   Final Note   Assessment Type Final Discharge Note   Anticipated Discharge Disposition Home   What phone number can be called within the next 1-3 days to see how you are doing after discharge? [phone]   Hospital Resources/Appts/Education Provided Appointments scheduled and added to AVS   Post-Acute Status   Discharge Delays None known at this time

## 2023-10-10 NOTE — TRANSFER OF CARE
"Anesthesia Transfer of Care Note    Patient: Nano White    Procedure(s) Performed: Procedure(s) (LRB):  LAPAROSCOPY, DIAGNOSTIC (N/A)  LYSIS, ADHESIONS, LAPAROSCOPIC (N/A)  REDUCTION, INTUSSUSCEPTION, INTESTINE, LAPAROSCOPIC (N/A)    Patient location: PACU    Anesthesia Type: general    Transport from OR: Transported from OR on room air with adequate spontaneous ventilation    Post pain: adequate analgesia    Post assessment: no apparent anesthetic complications and tolerated procedure well    Post vital signs: stable    Level of consciousness: awake, alert and oriented    Nausea/Vomiting: no nausea/vomiting    Complications: none    Transfer of care protocol was followed      Last vitals:   Visit Vitals  BP (!) 161/83   Pulse (!) 52   Temp 36.8 °C (98.2 °F) (Oral)   Resp 14   Ht 5' 5.5" (1.664 m)   Wt 86.6 kg (191 lb)   LMP 09/18/2023   SpO2 96%   BMI 31.30 kg/m²     "

## 2023-10-10 NOTE — ANESTHESIA POSTPROCEDURE EVALUATION
Anesthesia Post Evaluation    Patient: Nano White    Procedure(s) Performed: Procedure(s) (LRB):  LAPAROSCOPY, DIAGNOSTIC (N/A)  LYSIS, ADHESIONS, LAPAROSCOPIC (N/A)  REDUCTION, INTUSSUSCEPTION, INTESTINE, LAPAROSCOPIC (N/A)    Final Anesthesia Type: general      Patient location during evaluation: PACU  Patient participation: Yes- Able to Participate  Level of consciousness: awake and alert, oriented and awake  Post-procedure vital signs: reviewed and stable  Pain management: adequate  Airway patency: patent    PONV status at discharge: No PONV  Anesthetic complications: no      Cardiovascular status: blood pressure returned to baseline, hemodynamically stable and stable  Respiratory status: unassisted, spontaneous ventilation and room air  Hydration status: euvolemic  Follow-up not needed.          Vitals Value Taken Time   /71 10/09/23 2030   Temp 36.3 °C (97.3 °F) 10/09/23 2030   Pulse 53 10/09/23 2031   Resp 18 10/09/23 2031   SpO2 99 % 10/09/23 2031   Vitals shown include unvalidated device data.      No case tracking events are documented in the log.      Pain/Suzanne Score: Pain Rating Prior to Med Admin: 7 (10/9/2023  8:10 PM)  Pain Rating Post Med Admin: 3 (10/9/2023  2:36 PM)  Suzanne Score: 9 (10/9/2023  8:30 PM)

## 2023-10-10 NOTE — CONSULTS
[facility] - Emergency Dept  General Surgery  Consult Note    Patient Name: Nano White  MRN: [id number]  Code Status: Full Code  Admission Date: 10/9/2023  Hospital Length of Stay: 0 days  Attending Physician: Henrietta Schulte MD  Primary Care Provider: Zora Primary Doctor    Patient information was obtained from patient and ER records.     Consults  Subjective:     Principal Problem: Small bowel obstruction, partial    History of Present Illness: 48-year-old female with gastric bypass earlier this year presents with 24 hours worth of abdominal pain described as general discomfort starting today.  Much worse earlier today has gotten better since admission to the ER.  Was unable to eat and felt bloated.      No current facility-administered medications on file prior to encounter.     Current Outpatient Medications on File Prior to Encounter   Medication Sig    acetaminophen (TYLENOL EXTRA STRENGTH) 500 MG tablet Take 1,000 mg by mouth every 6 (six) hours as needed for Pain.    multivit-min/iron/folic acid/K (BARIATRIC MULTIVITAMINS ORAL) Take 1 capsule by mouth Daily.    ondansetron (ZOFRAN-ODT) 4 MG TbDL Take 1 tablet (4 mg total) by mouth every 6 (six) hours as needed (nv).       Review of patient's allergies indicates:   Allergen Reactions    Chocolate low lactose Hives     Hives and throat swelling with Chocolate    Benadryl [diphenhydramine hcl] Hives    Phentermine Hives and Itching    Codeine Hives and Nausea And Vomiting     HIVES       Past Medical History:   Diagnosis Date    H/O gastric banding 2014    History of removal of laparoscopic gastric banding device 09/12/2022    Obstructive sleep apnea     S/P gastric surgery 03/09/2016     Past Surgical History:   Procedure Laterality Date    BREAST REDUCTION      BREAST SURGERY      COLONOSCOPY N/A 4/28/2023    Procedure: COLONOSCOPY;  Surgeon: Sharon Cobos MD;  Location: [facility];  Service: Endoscopy;  Laterality: N/A;   lm/ppm    KNEE SURGERY Bilateral     SCOPE    LAPAROSCOPIC GASTRIC BANDING      LAPAROSCOPIC REMOVAL OF GASTRIC BAND N/A 2022    Procedure: REMOVAL, GASTRIC BAND, LAPAROSCOPIC;  Surgeon: Paresh Sampson MD;  Location: [facility] OR;  Service: General;  Laterality: N/A;    XI ROBOTIC GASTROENTEROSTOMY, KENNETH-EN-Y N/A 2023    Procedure: XI ROBOTIC GASTROENTEROSTOMY, KENNETH-EN-Y;  Surgeon: Paresh Sampson MD;  Location: [facility] OR;  Service: General;  Laterality: N/A;     Family History       Problem Relation (Age of Onset)    Cancer Maternal Grandmother, Maternal Grandfather, Paternal Grandmother, Paternal Grandfather    Diabetes Mother, Maternal Aunt, Maternal Uncle, Paternal Aunt, Paternal Uncle    Heart disease Mother    Obesity Mother, Sister, Sister          Tobacco Use    Smoking status: Former     Current packs/day: 0.00     Types: Cigarettes     Quit date: 10/18/2022     Years since quittin.9    Smokeless tobacco: Never   Substance and Sexual Activity    Alcohol use: Yes     Comment: Socially    Drug use: Never    Sexual activity: Yes     Partners: Male     Review of Systems   Gastrointestinal:  Positive for abdominal distention and abdominal pain. Negative for diarrhea.   All other systems reviewed and are negative.    Objective:     Vital Signs (Most Recent):  Temp: 98.2 °F (36.8 °C) (10/09/23 1230)  Pulse: (!) 52 (10/09/23 1800)  Resp: 14 (10/09/23 1406)  BP: (!) 161/83 (10/09/23 1800)  SpO2: 96 % (10/09/23 1805) Vital Signs (24h Range):  Temp:  [98.2 °F (36.8 °C)] 98.2 °F (36.8 °C)  Pulse:  [50-54] 52  Resp:  [14-18] 14  SpO2:  [95 %-100 %] 96 %  BP: (125-187)/() 161/83     Weight: 86.6 kg (191 lb)  Body mass index is 31.3 kg/m².     Physical Exam  Vitals and nursing note reviewed.   Constitutional:       General: She is not in acute distress.     Appearance: She is well-developed. She is not diaphoretic.   HENT:      Head: Normocephalic and atraumatic.      Mouth/Throat:       Pharynx: No oropharyngeal exudate.   Eyes:      General: No scleral icterus.     Conjunctiva/sclera: Conjunctivae normal.      Pupils: Pupils are equal, round, and reactive to light.   Neck:      Thyroid: No thyromegaly.      Vascular: No JVD.      Trachea: No tracheal deviation.   Cardiovascular:      Rate and Rhythm: Normal rate and regular rhythm.      Heart sounds: Normal heart sounds. No murmur heard.     No friction rub. No gallop.   Pulmonary:      Effort: Pulmonary effort is normal. No respiratory distress.      Breath sounds: Normal breath sounds. No stridor. No wheezing or rales.   Chest:      Chest wall: No tenderness.   Abdominal:      General: Bowel sounds are normal. There is no distension.      Palpations: Abdomen is soft. There is no mass.      Tenderness: There is abdominal tenderness (Upper abdomen and general). There is no guarding or rebound.   Musculoskeletal:         General: No tenderness. Normal range of motion.      Cervical back: Normal range of motion and neck supple.   Lymphadenopathy:      Cervical: No cervical adenopathy.   Skin:     General: Skin is warm and dry.      Findings: No erythema or rash.   Neurological:      Mental Status: She is alert and oriented to person, place, and time.      Cranial Nerves: No cranial nerve deficit.   Psychiatric:         Behavior: Behavior normal.            I have reviewed all pertinent lab results within the past 24 hours.  CBC:   Recent Labs   Lab 10/09/23  1319   WBC 5.44   RBC 4.00   HGB 12.9   HCT 38.7      MCV 97   MCH 32.3*   MCHC 33.3     CMP:   Recent Labs   Lab 10/09/23  1319   GLU 85   CALCIUM 9.2   ALBUMIN 3.8   PROT 6.9      K 3.7   CO2 27      BUN 12   CREATININE 0.7   ALKPHOS 60   ALT 11   AST 14   BILITOT 1.1*       Significant Diagnostics:  I have reviewed all pertinent imaging results/findings within the past 24 hours.  CT: I have reviewed all pertinent results/findings within the past 24 hours and my personal  findings are:  Dilated fluid filled and fecalized small bowel appeared to be in his small bowel internal hernia formation      Assessment/Plan:     * Small bowel obstruction, partial  Diagnostic laparoscopy  Rule out internal hernia      VTE Risk Mitigation (From admission, onward)         Ordered     IP VTE HIGH RISK PATIENT  Once         10/09/23 1854     Place sequential compression device  Until discontinued         10/09/23 1854                Thank you for your consult. I will follow-up with patient. Please contact us if you have any additional questions.    Paresh Sampson MD  General Surgery  [facility] - Emergency Dept

## 2023-10-10 NOTE — CARE UPDATE
10/09/23 3565   PRE-TX-O2   Device (Oxygen Therapy) room air   SpO2 100 %   Pulse Oximetry Type Intermittent   $ Pulse Oximetry - Multiple Charge Pulse Oximetry - Multiple   Incentive Spirometer   $ Incentive Spirometer Charges other (see comments)  (PT WAS RESTING AT THIS TIME)   Respiratory Evaluation   $ Care Plan Tech Time 15 min   $ Eval/Re-eval Charges Evaluation

## 2023-10-10 NOTE — NURSING
Nurses Note -- 4 Eyes      10/9/2023   9:47 PM      Skin assessed during: Admit      [x] No Altered Skin Integrity Present    [x]Prevention Measures Documented      [] Yes- Altered Skin Integrity Present or Discovered   [] LDA Added if Not in Epic (Describe Wound)   [] New Altered Skin Integrity was Present on Admit and Documented in LDA   [] Wound Image Taken    Wound Care Consulted? No    Attending Nurse:  [id number]    Second RN/Staff Member:     [id number]

## 2023-10-10 NOTE — DISCHARGE SUMMARY
"[facility]  Discharge Summary      Patient Name: Nano White  MRN: [id number]  NIGHAT: [id number]  Patient Class: IP- Inpatient  Admission Date: 10/9/2023  Hospital Length of Stay: 1 days  Discharge Date and Time: 10/10/2023  3:42 PM  Attending Physician: No att. providers found   Discharging Provider: Any Marvin MD  Primary Care Provider: Tori Pacheco PA    Primary Care Team: Networked reference to record PCT     HPI:   47 y/o female with history of gatric bypass surgery in May presented to the ED after having abdominal pain starting around 0900 this morning. Per ED doctor patient states that she thought it was gas pain at first however it continued to get worse until she was doubled over in pain that she said radiated to her esophagus, which is when she decided to come to the ED. ED provider states that the patient was experiencing nausea but denies any vomiting, chest pain, SOB, weakness, or dizziness.     I saw the patient once she was placed in her hospital room after she got surgery. Patient told me that this morning she got up, went to the gym, got home and showered and was at home drinking coffee when her LUQ started to hurt. She originally thought that it was gas but states that within 20 minutes she was "curled up on the couch in the fetal position" and decided 2 hours after the pain had started that she needed to come to the hospital. After surgery the patient states that she feels abdominal soreness, from the surgery, but denies any other symptoms at this time.     ED: Vitals showed HR 54, /106, RR 18 saturating at 100 % on RA. After receiving Morphine for pain control in the ED her vitals improved to HR 51 with /77. Labs were significant for BNP of 197. CT abdomen pelvis showed dilated small bowel in the left upper quadrant at the site of multiple surgical clips consistent with at least partial small bowel obstruction. CXR showed no acute " cardiopulmonary changes.       Procedure(s) (LRB):  LAPAROSCOPY, DIAGNOSTIC (N/A)  LYSIS, ADHESIONS, LAPAROSCOPIC (N/A)  REDUCTION, INTUSSUSCEPTION, INTESTINE, LAPAROSCOPIC (N/A)      Hospital Course:   Patient with history of prior gastric banding followed by gastric bypass who presented with abdominal pain.  CT on presentation with concern for small-bowel obstruction secondary to internal hernia with 5.3 cm abnormality in pelvis.  She was seen by Dr. Sampson and taken to the OR for exploratory laparotomy, intraoperative findings of small-bowel obstruction secondary to adhesion, foreign body in left pelvis, removed, placed on medical floor postoperatively. On 10/10 states feels improved, no nausea, vomiting or significant abdominal pain, tolerating regular diet, states she passed flatus.  Communicated with general surgery, cleared for discharge with outpatient follow-up.  Patient is eager for discharge.  Discharge plan including medication, follow-up as well as return precautions were reviewed with patient, she expressed understanding, no questions or concerns.  Discussed with nursing.    Discharge examination   Sitting side of bed, alert, oriented, on room air, laparoscopic incisions well approximated, soft, bowel sounds heard       Goals of Care Treatment Preferences:  Code Status: Full Code      Consults:   Consults (From admission, onward)        Status Ordering Provider     Inpatient consult to General surgery  Once        Provider:  Paresh Sampson MD    Acknowledged FRANCIS WHITE          No new Assessment & Plan notes have been filed under this hospital service since the last note was generated.  Service: Hospital Medicine    Final Active Diagnoses:    Diagnosis Date Noted POA    PRINCIPAL PROBLEM:  Status post laparotomy with lysis of adhesions [Z98.890] 10/10/2023 Not Applicable    History of gastric bypass [Z98.84] 10/10/2023 Not Applicable     Chronic    Intra-abdominal foreign body s/p removal  "[S30.851A] 10/10/2023 Yes     Chronic    Class 1 obesity in adult [E66.9] 10/10/2023 Yes     Chronic    History of removal of laparoscopic gastric banding device [Z98.84] 09/12/2022 Not Applicable      Problems Resolved During this Admission:    Diagnosis Date Noted Date Resolved POA    Small bowel obstruction, partial [K56.600] 10/09/2023 10/10/2023 Yes       Discharged Condition: good    Disposition: Home or Self Care    Follow Up:   Follow-up Information     Paresh Sampson MD. Go on 10/25/2023.    Specialties: General Surgery, Bariatrics, Surgery  Why: surgery follow up appt scheduled at 1:15PM  Contact information:  [facility]  [address]  [place] [address] [phone]                       Patient Instructions:      Diet Adult Regular     Notify your health care provider if you experience any of the following:  temperature >100.4     Notify your health care provider if you experience any of the following:  severe uncontrolled pain     Activity as tolerated   Order Comments: No heavy lifting       Significant Diagnostic Studies: Labs:   BMP:   Recent Labs   Lab 10/09/23  1319 10/10/23  0504   GLU 85 132*    136   K 3.7 3.9    104   CO2 27 24   BUN 12 12   CREATININE 0.7 0.6   CALCIUM 9.2 8.7   MG  --  1.6   , CMP   Recent Labs   Lab 10/09/23  1319 10/10/23  0504    136   K 3.7 3.9    104   CO2 27 24   GLU 85 132*   BUN 12 12   CREATININE 0.7 0.6   CALCIUM 9.2 8.7   PROT 6.9  --    ALBUMIN 3.8  --    BILITOT 1.1*  --    ALKPHOS 60  --    AST 14  --    ALT 11  --    ANIONGAP 6* 8   , CBC   Recent Labs   Lab 10/09/23  1319 10/10/23  0504   WBC 5.44 7.11   HGB 12.9 13.0   HCT 38.7 39.1    193   , INR No results found for: "INR", "PROTIME", Lipid Panel   Lab Results   Component Value Date    CHOL 207 (H) 02/10/2023    HDL 55 02/10/2023    LDLCALC 137.4 02/10/2023    TRIG 73 02/10/2023    CHOLHDL 26.6 02/10/2023   , Troponin No results for input(s): "TROPONINI" in the last 168 " "hours. and A1C: No results for input(s): "HGBA1C" in the last 4320 hours.    Pending Diagnostic Studies:     None       CT Abdomen Pelvis With Contrast    Result Date: 10/9/2023  CMS MANDATED QUALITY DATA - CT RADIATION - 436 All CT scans at this facility use dose modulation, iterative-reconstruction, and/or weight-based dosing when appropriate to reduce radiation dose to as low as reasonably achievable. HISTORY:Acute nonlocalized abdominal pain. TECHNIQUE: Serial axial images were obtained from the domes of the diaphragm to the pubic symphysis with 100ccs Omnipaque 350 intravenous contrast. Oral contrast was not administered for this exam. Coronal and sagittal reconstructions were performed. Patient received 814 mGy-cm of radiation exposure from this exam. COMPARISON: No previous study available for comparison. FINDINGS: Lung bases: Lung bases are free of infiltrate or pleural effusion. Liver: Liver appears normal in size and contour. Gallbladder appears nondistended. Pancreas: Unremarkable. Spleen: Normal. Adrenal glands: Unremarkable. Kidneys and ureters: Bilateral functioning kidneys are noted with normal renal contour and size. No hydronephrosis is seen. Ureters appear nondilated. Bladder: Bladder is unremarkable. Bowel: Postop changes of the stomach are present. Findings are most consistent with previous gastric bypass. Multiple dilated loops of small bowel are visualized in the left upper quadrant measuring up to 3.8 cm in diameter with a somewhat swirled appearance noted on the coronal images at the site of multiple surgical clips. Imaging appearance is consistent with at least partial small bowel obstruction which may be secondary to an internal hernia. Distal small bowel appears nondistended. Small volume of fecal material is noted in the nondistended colon. Peritoneum: Small volume of free fluid is noted in the pelvis. Bilateral ovarian cysts are visualized, largest on the right measuring up to 2 cm in " diameter. A linear radiopaque nonmetallic foreign body is visualized in the left aspect of the pelvis measuring 5.2 cm in length and approximately 4 mm in diameter. This radiopaque density is of uncertain etiology. Recommend correlation with patient's surgical history. No free air is seen. Retroperitoneum: Unremarkable. Lymph nodes: No evidence of abdominal or pelvic adenopathy is seen. Abdominal wall: 3 cm subcutaneous cyst is visualized in the left inguinal region suspicious for a sebaceous cyst. Fatty mass is noted along the right pelvic sidewall measuring 9.0 x 5.5 x 4.0 cm in size. Appearance is most suspicious for a lipoma. Bones: No acute bony abnormality is seen. IMPRESSION: 1. Dilated small bowel in the left upper quadrant at the site of multiple surgical clips consistent with at least partial small bowel obstruction which may be secondary to a internal hernia. 2. Postop changes of gastric bypass. 3. Bilateral ovarian cysts. 4. Small volume of pelvic free fluid. 5. 5.2 cm linear radiopaque foreign body in the left aspect of the pelvis of uncertain etiology. Recommend correlation with patient's surgical history. 6. Fatty mass of the right pelvic sidewall likely representing a lipoma. 7. 3 cm subcutaneous cyst in the left inguinal region likely representing a sebaceous cyst. Electronically signed by:  Devaughn Case MD  10/09/2023 04:40 PM CDT Workstation: [id number]    X-Ray Chest PA And Lateral    Result Date: 10/9/2023  CLINICAL HISTORY: 48 years (1975) Female Abdominal pain TECHNIQUE: PA and lateral radiograph of the chest. Two views. COMPARISON: None available. FINDINGS: The lungs are clear. Costophrenic angles are seen without effusion. No pneumothorax is identified. The heart is normal in size. The mediastinum is within normal limits. Osseous structures appear within normal limits. The visualized upper abdomen is unremarkable. IMPRESSION: No acute cardiac or pulmonary process. . Electronically  signed by:  Brad Monroe MD  10/09/2023 03:23 PM CDT Workstation: [id number]    Medications:  Reconciled Home Medications:      Medication List      START taking these medications    HYDROcodone-acetaminophen 5-325 mg per tablet  Commonly known as: NORCO  Take 1 tablet by mouth every 8 (eight) hours as needed for Pain.        CONTINUE taking these medications    BARIATRIC MULTIVITAMINS ORAL  Take 1 capsule by mouth Daily.     ondansetron 4 MG Tbdl  Commonly known as: ZOFRAN-ODT  Take 1 tablet (4 mg total) by mouth every 6 (six) hours as needed (nv).     TYLENOL EXTRA STRENGTH 500 MG tablet  Generic drug: acetaminophen  Take 1,000 mg by mouth every 6 (six) hours as needed for Pain.            Indwelling Lines/Drains at time of discharge:   Lines/Drains/Airways     None                 Time spent on the discharge of patient: 33 minutes         Any Marvin MD  Department of Hospital Medicine  [facility]

## 2023-10-10 NOTE — PROGRESS NOTES
"[facility]  Progress Note    Patient Name: Nano White  MRN: [id number]  Patient Class: IP- Inpatient   Admission Date: 10/9/2023  Length of Stay: 0 days  Attending Physician: Any Marvin MD  Primary Care Provider: Zora, Primary Doctor        Subjective:     Principal Problem:Status post laparotomy with lysis of adhesions        HPI:  49 y/o female with history of gatric bypass surgery in May presented to the ED after having abdominal pain starting around 0900 this morning. Per ED doctor patient states that she thought it was gas pain at first however it continued to get worse until she was doubled over in pain that she said radiated to her esophagus, which is when she decided to come to the ED. ED provider states that the patient was experiencing nausea but denies any vomiting, chest pain, SOB, weakness, or dizziness.     I saw the patient once she was placed in her hospital room after she got surgery. Patient told me that this morning she got up, went to the gym, got home and showered and was at home drinking coffee when her LUQ started to hurt. She originally thought that it was gas but states that within 20 minutes she was "curled up on the couch in the fetal position" and decided 2 hours after the pain had started that she needed to come to the hospital. After surgery the patient states that she feels abdominal soreness, from the surgery, but denies any other symptoms at this time.     ED: Vitals showed HR 54, /106, RR 18 saturating at 100 % on RA. After receiving Morphine for pain control in the ED her vitals improved to HR 51 with /77. Labs were significant for BNP of 197. CT abdomen pelvis showed dilated small bowel in the left upper quadrant at the site of multiple surgical clips consistent with at least partial small bowel obstruction. CXR showed no acute cardiopulmonary changes.       Overview/Hospital Course:  Patient with a history of prior gastric " banding followed by gastric bypass who presented with abdominal pain.  CT on presentation with concern for small-bowel obstruction secondary to internal hernia with 5.3 cm abnormality in pelvis.  She was seen by Dr. Sampson and taken to the OR for exploratory laparotomy, intraoperative findings of small-bowel obstruction secondary to adhesion, foreign body in left pelvis, removed, placed in medical floor postoperatively.  Diet was advanced as tolerated.      Interval History:  Patient denies any significant abdominal pain, no nausea, vomiting, tolerating regular diet, states she passed flatus twice this morning.  Does report chronic sciatica pain which she believes is from lying in bed.  Using incentive spirometer and getting volumes of 2000 cc.  Afebrile, on room air, labs with hemoglobin 13, BUN/creatinine 12/0.6.  Discussed with patient, eager for discharge.  Communicated with general surgery.    Review of Systems   Constitutional:  Negative for fever.   Respiratory:  Negative for shortness of breath.    Cardiovascular:  Negative for chest pain.   Gastrointestinal:  Negative for nausea and vomiting.   Psychiatric/Behavioral:  Negative for confusion.      Objective:     Vital Signs (Most Recent):  Temp: 98.8 °F (37.1 °C) (10/10/23 0827)  Pulse: 60 (10/10/23 0827)  Resp: 16 (10/10/23 0911)  BP: 112/65 (10/10/23 0827)  SpO2: 98 % (10/10/23 0827) Vital Signs (24h Range):  Temp:  [97.3 °F (36.3 °C)-98.8 °F (37.1 °C)] 98.8 °F (37.1 °C)  Pulse:  [50-96] 60  Resp:  [11-20] 16  SpO2:  [95 %-100 %] 98 %  BP: (112-187)/() 112/65     Weight: 86.6 kg (191 lb)  Body mass index is 31.78 kg/m².    Intake/Output Summary (Last 24 hours) at 10/10/2023 0913  Last data filed at 10/9/2023 1953  Gross per 24 hour   Intake 950 ml   Output 100 ml   Net 850 ml         Physical Exam  Vitals and nursing note reviewed.   Constitutional:       General: She is not in acute distress.     Appearance: She is not ill-appearing.       "Comments: Lying in bed on her right side   HENT:      Head: Normocephalic and atraumatic.      Mouth/Throat:      Mouth: Mucous membranes are moist.   Cardiovascular:      Rate and Rhythm: Normal rate and regular rhythm.   Pulmonary:      Comments: On room air  Abdominal:      General: Bowel sounds are normal.      Palpations: Abdomen is soft.   Musculoskeletal:      Cervical back: Neck supple.   Skin:     General: Skin is warm.      Comments: Multiple tattoos present   Neurological:      Mental Status: She is alert and oriented to person, place, and time.   Psychiatric:         Mood and Affect: Mood normal.             Significant Labs: BMP:   Recent Labs   Lab 10/10/23  0504   *      K 3.9      CO2 24   BUN 12   CREATININE 0.6   CALCIUM 8.7   MG 1.6     CBC:   Recent Labs   Lab 10/09/23  1319 10/10/23  0504   WBC 5.44 7.11   HGB 12.9 13.0   HCT 38.7 39.1    193     CMP:   Recent Labs   Lab 10/09/23  1319 10/10/23  0504    136   K 3.7 3.9    104   CO2 27 24   GLU 85 132*   BUN 12 12   CREATININE 0.7 0.6   CALCIUM 9.2 8.7   PROT 6.9  --    ALBUMIN 3.8  --    BILITOT 1.1*  --    ALKPHOS 60  --    AST 14  --    ALT 11  --    ANIONGAP 6* 8     Cardiac Markers:   Recent Labs   Lab 10/09/23  1319   *     Lactic Acid: No results for input(s): "LACTATE" in the last 48 hours.  Magnesium:   Recent Labs   Lab 10/10/23  0504   MG 1.6       Significant Imaging: I have reviewed all pertinent imaging results/findings within the past 24 hours.      Assessment/Plan:      Active Hospital Problems    Diagnosis    *Status post laparotomy with lysis of adhesions    History of gastric bypass    Intra-abdominal foreign body s/p removal    Class 1 obesity in adult    History of removal of laparoscopic gastric banding device     Plan:  Continue care on medical floor  Continue oral diet, currently on regular  Discontinue IV fluids  On postop Ancef   P.r.n. antiemetic and analgesic if needed "   Serial abdominal examination   Increase activity, out of bed to chair, ambulate, incentive spirometer  On chemoprophylaxis for DVT   Appreciate general surgery input  Further plan as per hospital course    VTE Risk Mitigation (From admission, onward)         Ordered     enoxaparin injection 40 mg  Daily         10/09/23 2040     IP VTE HIGH RISK PATIENT  Once         10/09/23 2040     Place WILIAM hose  Until discontinued         10/09/23 2040     Place sequential compression device  Until discontinued         10/09/23 2040     Place sequential compression device  Until discontinued         10/09/23 1854                Discharge Planning   ROCIO: 10/10/2023     Code Status: Full Code   Is the patient medically ready for discharge?:     Reason for patient still in hospital (select all that apply): Consult recommendations                     Any Marvin MD  Department of Hospital Medicine   [facility]

## 2023-10-10 NOTE — ASSESSMENT & PLAN NOTE
Patient came to the ED for intense LUQ abdominal pain for 2 hours prior to arrival to the ED  CT Abdomen Pelvis   - Dilated small bowel in the left upper quadrant at the site of multiple surgical clips consistent with at least partial small bowel obstruction which may be secondary to a internal hernia.  - Postop changes of gastric bypass.  - Bilateral ovarian cysts.  - Small volume of pelvic free fluid.  - 5.2 cm linear radiopaque foreign body in the left aspect of the pelvis of uncertain etiology. Recommend correlation with patient's surgical history.  - Fatty mass of the right pelvic sidewall likely representing a lipoma.  - 3 cm subcutaneous cyst in the left inguinal region likely representing a sebaceous cyst.    Dr Sampson was consulted   - Brought patient to Diagnostic laparoscopy    Continue to monitor patient

## 2023-10-10 NOTE — ANESTHESIA PROCEDURE NOTES
Intubation    Date/Time: 10/9/2023 7:05 PM    Performed by: Chung Seals CRNA  Authorized by: Tay Uribe MD    Intubation:     Induction:  Rapid sequence induction    Intubated:  Postinduction    Mask Ventilation:  N/a    Attempts:  1    Attempted By:  CRNA    Method of Intubation:  Video laryngoscopy    Blade:  Bello 3    Laryngeal View Grade: Grade I - full view of cords      Difficult Airway Encountered?: No      Complications:  None    Airway Device:  Oral endotracheal tube    Airway Device Size:  7.5    Style/Cuff Inflation:  Cuffed    Inflation Amount (mL):  7    Tube secured:  21    Secured at:  The lips    Placement Verified By:  Capnometry    Complicating Factors:  None    Findings Post-Intubation:  BS equal bilateral and atraumatic/condition of teeth unchanged

## 2023-10-10 NOTE — SUBJECTIVE & OBJECTIVE
No current facility-administered medications on file prior to encounter.     Current Outpatient Medications on File Prior to Encounter   Medication Sig    acetaminophen (TYLENOL EXTRA STRENGTH) 500 MG tablet Take 1,000 mg by mouth every 6 (six) hours as needed for Pain.    multivit-min/iron/folic acid/K (BARIATRIC MULTIVITAMINS ORAL) Take 1 capsule by mouth Daily.    ondansetron (ZOFRAN-ODT) 4 MG TbDL Take 1 tablet (4 mg total) by mouth every 6 (six) hours as needed (nv).       Review of patient's allergies indicates:   Allergen Reactions    Chocolate low lactose Hives     Hives and throat swelling with Chocolate    Benadryl [diphenhydramine hcl] Hives    Phentermine Hives and Itching    Codeine Hives and Nausea And Vomiting     HIVES       Past Medical History:   Diagnosis Date    H/O gastric banding 2014    History of removal of laparoscopic gastric banding device 09/12/2022    Obstructive sleep apnea     S/P gastric surgery 03/09/2016     Past Surgical History:   Procedure Laterality Date    BREAST REDUCTION      BREAST SURGERY      COLONOSCOPY N/A 4/28/2023    Procedure: COLONOSCOPY;  Surgeon: Sharon Cobos MD;  Location: [facility];  Service: Endoscopy;  Laterality: N/A;  lm/ppm    KNEE SURGERY Bilateral     SCOPE    LAPAROSCOPIC GASTRIC BANDING      LAPAROSCOPIC REMOVAL OF GASTRIC BAND N/A 08/19/2022    Procedure: REMOVAL, GASTRIC BAND, LAPAROSCOPIC;  Surgeon: Paresh Sampson MD;  Location: [facility] OR;  Service: General;  Laterality: N/A;    XI ROBOTIC GASTROENTEROSTOMY, KENNETH-EN-Y N/A 5/1/2023    Procedure: XI ROBOTIC GASTROENTEROSTOMY, KENNETH-EN-Y;  Surgeon: Paresh Sampson MD;  Location: [facility] OR;  Service: General;  Laterality: N/A;     Family History       Problem Relation (Age of Onset)    Cancer Maternal Grandmother, Maternal Grandfather, Paternal Grandmother, Paternal Grandfather    Diabetes Mother, Maternal Aunt, Maternal Uncle, Paternal Aunt, Paternal Uncle    Heart disease Mother    Obesity  Mother, Sister, Sister          Tobacco Use    Smoking status: Former     Current packs/day: 0.00     Types: Cigarettes     Quit date: 10/18/2022     Years since quittin.9    Smokeless tobacco: Never   Substance and Sexual Activity    Alcohol use: Yes     Comment: Socially    Drug use: Never    Sexual activity: Yes     Partners: Male     Review of Systems   Gastrointestinal:  Positive for abdominal distention and abdominal pain. Negative for diarrhea.   All other systems reviewed and are negative.    Objective:     Vital Signs (Most Recent):  Temp: 98.2 °F (36.8 °C) (10/09/23 1230)  Pulse: (!) 52 (10/09/23 1800)  Resp: 14 (10/09/23 1406)  BP: (!) 161/83 (10/09/23 1800)  SpO2: 96 % (10/09/23 1805) Vital Signs (24h Range):  Temp:  [98.2 °F (36.8 °C)] 98.2 °F (36.8 °C)  Pulse:  [50-54] 52  Resp:  [14-18] 14  SpO2:  [95 %-100 %] 96 %  BP: (125-187)/() 161/83     Weight: 86.6 kg (191 lb)  Body mass index is 31.3 kg/m².     Physical Exam  Vitals and nursing note reviewed.   Constitutional:       General: She is not in acute distress.     Appearance: She is well-developed. She is not diaphoretic.   HENT:      Head: Normocephalic and atraumatic.      Mouth/Throat:      Pharynx: No oropharyngeal exudate.   Eyes:      General: No scleral icterus.     Conjunctiva/sclera: Conjunctivae normal.      Pupils: Pupils are equal, round, and reactive to light.   Neck:      Thyroid: No thyromegaly.      Vascular: No JVD.      Trachea: No tracheal deviation.   Cardiovascular:      Rate and Rhythm: Normal rate and regular rhythm.      Heart sounds: Normal heart sounds. No murmur heard.     No friction rub. No gallop.   Pulmonary:      Effort: Pulmonary effort is normal. No respiratory distress.      Breath sounds: Normal breath sounds. No stridor. No wheezing or rales.   Chest:      Chest wall: No tenderness.   Abdominal:      General: Bowel sounds are normal. There is no distension.      Palpations: Abdomen is soft. There is no  mass.      Tenderness: There is abdominal tenderness (Upper abdomen and general). There is no guarding or rebound.   Musculoskeletal:         General: No tenderness. Normal range of motion.      Cervical back: Normal range of motion and neck supple.   Lymphadenopathy:      Cervical: No cervical adenopathy.   Skin:     General: Skin is warm and dry.      Findings: No erythema or rash.   Neurological:      Mental Status: She is alert and oriented to person, place, and time.      Cranial Nerves: No cranial nerve deficit.   Psychiatric:         Behavior: Behavior normal.            I have reviewed all pertinent lab results within the past 24 hours.  CBC:   Recent Labs   Lab 10/09/23  1319   WBC 5.44   RBC 4.00   HGB 12.9   HCT 38.7      MCV 97   MCH 32.3*   MCHC 33.3     CMP:   Recent Labs   Lab 10/09/23  1319   GLU 85   CALCIUM 9.2   ALBUMIN 3.8   PROT 6.9      K 3.7   CO2 27      BUN 12   CREATININE 0.7   ALKPHOS 60   ALT 11   AST 14   BILITOT 1.1*       Significant Diagnostics:  I have reviewed all pertinent imaging results/findings within the past 24 hours.  CT: I have reviewed all pertinent results/findings within the past 24 hours and my personal findings are:  Dilated fluid filled and fecalized small bowel appeared to be in his small bowel internal hernia formation

## 2023-10-10 NOTE — PLAN OF CARE
[facility]  Initial Discharge Assessment       Primary Care Provider: No, Primary Doctor    Admission Diagnosis: Partial small bowel obstruction [K56.600]    Admission Date: 10/9/2023  Expected Discharge Date: 10/11/2023     met with Pt at bedside to complete discharge assessment. Pt AAOx4s. Demographics, PCP, and insurance verified. Pt sees IBAN Pacheco for primary care. No home health. No dialysis. Pt reports ability to complete ADLs without assistance. Pt verbalized plan to discharge home via family transport. Pt has no other needs to be addressed at this time.    Transition of Care Barriers: None    Payor: [employer] [facility] / Plan: [employer] ALL OUT OF STATE / Product Type: PPO /     Extended Emergency Contact Information  Primary Emergency Contact: Melchor White  Address: [address]           [place] [address] [address]  Home Phone: [phone]  Relation: Spouse    Discharge Plan A: Home with family  Discharge Plan B: Home with family      [id number] - [place] - [address] [facility] [address]  [address] [place] [address]  Phone: [phone] Fax: [phone]      Initial Assessment (most recent)       Adult Discharge Assessment - 10/10/23 1022          Discharge Assessment    Assessment Type Discharge Planning Assessment     Confirmed/corrected address, phone number and insurance Yes     Confirmed Demographics Correct on Facesheet     Source of Information patient     Does patient/caregiver understand observation status Yes     Communicated ROCIO with patient/caregiver Date not available/Unable to determine     Reason For Admission Status post laparotomy with lysis of adhesions     People in Home spouse     Facility Arrived From: Home     Do you expect to return to your current living situation? Yes     Do you have help at home or someone to help you manage your care at home? Yes     Who are your  caregiver(s) and their phone number(s)? Melchor White (Spouse)   [phone]     Prior to hospitilization cognitive status: Alert/Oriented     Current cognitive status: Alert/Oriented     Home Accessibility wheelchair accessible     Home Layout Able to live on 1st floor     Equipment Currently Used at Home none     Readmission within 30 days? No     Patient currently being followed by outpatient case management? No     Do you currently have service(s) that help you manage your care at home? No     Do you take prescription medications? No     Do you have prescription coverage? Yes     Coverage Payor:  [employer] - [facility] ALL OUT OF STATE     Do you have any problems affording any of your prescribed medications? No     Who is going to help you get home at discharge? Melchor White (Spouse)   [phone]     How do you get to doctors appointments? car, drives self     Are you on dialysis? No     Do you take coumadin? No     DME Needed Upon Discharge  none     Discharge Plan discussed with: Patient     Transition of Care Barriers None     Discharge Plan A Home with family     Discharge Plan B Home with family

## 2023-10-10 NOTE — HPI
"49 y/o female with history of gatric bypass surgery in May presented to the ED after having abdominal pain starting around 0900 this morning. Per ED doctor patient states that she thought it was gas pain at first however it continued to get worse until she was doubled over in pain that she said radiated to her esophagus, which is when she decided to come to the ED. ED provider states that the patient was experiencing nausea but denies any vomiting, chest pain, SOB, weakness, or dizziness.     I saw the patient once she was placed in her hospital room after she got surgery. Patient told me that this morning she got up, went to the gym, got home and showered and was at home drinking coffee when her LUQ started to hurt. She originally thought that it was gas but states that within 20 minutes she was "curled up on the couch in the fetal position" and decided 2 hours after the pain had started that she needed to come to the hospital. After surgery the patient states that she feels abdominal soreness, from the surgery, but denies any other symptoms at this time.     ED: Vitals showed HR 54, /106, RR 18 saturating at 100 % on RA. After receiving Morphine for pain control in the ED her vitals improved to HR 51 with /77. Labs were significant for BNP of 197. CT abdomen pelvis showed dilated small bowel in the left upper quadrant at the site of multiple surgical clips consistent with at least partial small bowel obstruction. CXR showed no acute cardiopulmonary changes.   "

## 2023-10-12 ENCOUNTER — PATIENT OUTREACH (OUTPATIENT)
Dept: ADMINISTRATIVE | Facility: CLINIC | Age: 48
End: 2023-10-12
Payer: COMMERCIAL

## 2023-10-12 NOTE — PROGRESS NOTES
C3 nurse spoke with Nano White for a TCC post hospital discharge follow up call. Pt denies any new symptoms or complaints. The patient does not have a scheduled HOSFU appointment with her PCP, Tori Pacheco PA within 5-7 days post hospital discharge date 10/10/23. C3 nurse was unable to schedule HOSFU appointment in [facility] and the pt denied the need for a NPHV. Message sent to PCP staff requesting they contact patient and schedule follow up appointment.

## 2023-10-25 ENCOUNTER — OFFICE VISIT (OUTPATIENT)
Dept: SURGERY | Facility: CLINIC | Age: 48
End: 2023-10-25
Payer: COMMERCIAL

## 2023-10-25 VITALS
HEART RATE: 65 BPM | WEIGHT: 184.81 LBS | RESPIRATION RATE: 16 BRPM | SYSTOLIC BLOOD PRESSURE: 135 MMHG | TEMPERATURE: 98 F | DIASTOLIC BLOOD PRESSURE: 72 MMHG | HEIGHT: 66 IN | BODY MASS INDEX: 29.7 KG/M2

## 2023-10-25 DIAGNOSIS — R11.0 NAUSEA: Primary | ICD-10-CM

## 2023-10-25 PROCEDURE — 99024 PR POST-OP FOLLOW-UP VISIT: ICD-10-PCS | Mod: S$GLB,,, | Performed by: SURGERY

## 2023-10-25 PROCEDURE — 3075F PR MOST RECENT SYSTOLIC BLOOD PRESS GE 130-139MM HG: ICD-10-PCS | Mod: CPTII,S$GLB,, | Performed by: SURGERY

## 2023-10-25 PROCEDURE — 3078F DIAST BP <80 MM HG: CPT | Mod: CPTII,S$GLB,, | Performed by: SURGERY

## 2023-10-25 PROCEDURE — 99999 PR PBB SHADOW E&M-EST. PATIENT-LVL III: ICD-10-PCS | Mod: PBBFAC,,, | Performed by: SURGERY

## 2023-10-25 PROCEDURE — 99999 PR PBB SHADOW E&M-EST. PATIENT-LVL III: CPT | Mod: PBBFAC,,, | Performed by: SURGERY

## 2023-10-25 PROCEDURE — 1159F PR MEDICATION LIST DOCUMENTED IN MEDICAL RECORD: ICD-10-PCS | Mod: CPTII,S$GLB,, | Performed by: SURGERY

## 2023-10-25 PROCEDURE — 3078F PR MOST RECENT DIASTOLIC BLOOD PRESSURE < 80 MM HG: ICD-10-PCS | Mod: CPTII,S$GLB,, | Performed by: SURGERY

## 2023-10-25 PROCEDURE — 3044F HG A1C LEVEL LT 7.0%: CPT | Mod: CPTII,S$GLB,, | Performed by: SURGERY

## 2023-10-25 PROCEDURE — 1159F MED LIST DOCD IN RCRD: CPT | Mod: CPTII,S$GLB,, | Performed by: SURGERY

## 2023-10-25 PROCEDURE — 3044F PR MOST RECENT HEMOGLOBIN A1C LEVEL <7.0%: ICD-10-PCS | Mod: CPTII,S$GLB,, | Performed by: SURGERY

## 2023-10-25 PROCEDURE — 3075F SYST BP GE 130 - 139MM HG: CPT | Mod: CPTII,S$GLB,, | Performed by: SURGERY

## 2023-10-25 PROCEDURE — 99024 POSTOP FOLLOW-UP VISIT: CPT | Mod: S$GLB,,, | Performed by: SURGERY

## 2023-10-25 RX ORDER — ONDANSETRON 4 MG/1
4 TABLET, ORALLY DISINTEGRATING ORAL EVERY 6 HOURS PRN
Qty: 30 TABLET | Refills: 0 | Status: SHIPPED | OUTPATIENT
Start: 2023-10-25 | End: 2024-03-14 | Stop reason: SDUPTHER

## 2023-10-25 NOTE — PROGRESS NOTES
Post Op Note    Sp diagnostic laparoscopy     No complaints doing well  No more obstructive symptoms    Vitals:    10/25/23 1313   BP: 135/72   Pulse: 65   Resp: 16   Temp: 98.1 °F (36.7 °C)       Body comp:  Fat Percent:  40.2 %  Fat Mass:  74.2 lb  FFM:  110.6 lb  TBW: 78.8 lb  TBW %:  42.6 %  BMR: 1527 kcal    Abdomen soft and bengin   Incisions healing well    Labs: none    A/P: s/p diagnostic laparoscopy, adhesiolysis, removal of foreign body  -follow up with gyn for what looks to be a fibroid on uterus- see media  -diet as tolerated            Co morbidities:     No diagnosis found.    -All above: Evaluated, monitored, and treated with diet and exercise program.

## 2024-02-21 ENCOUNTER — OFFICE VISIT (OUTPATIENT)
Dept: BARIATRICS | Facility: CLINIC | Age: 49
End: 2024-02-21
Payer: COMMERCIAL

## 2024-02-21 VITALS
BODY MASS INDEX: 29.63 KG/M2 | SYSTOLIC BLOOD PRESSURE: 154 MMHG | RESPIRATION RATE: 16 BRPM | WEIGHT: 184.38 LBS | HEIGHT: 66 IN | HEART RATE: 62 BPM | TEMPERATURE: 98 F | DIASTOLIC BLOOD PRESSURE: 102 MMHG

## 2024-02-21 DIAGNOSIS — E66.01 MORBID OBESITY: Primary | ICD-10-CM

## 2024-02-21 PROCEDURE — 3077F SYST BP >= 140 MM HG: CPT | Mod: CPTII,S$GLB,, | Performed by: SURGERY

## 2024-02-21 PROCEDURE — 99213 OFFICE O/P EST LOW 20 MIN: CPT | Mod: S$GLB,,, | Performed by: SURGERY

## 2024-02-21 PROCEDURE — 3080F DIAST BP >= 90 MM HG: CPT | Mod: CPTII,S$GLB,, | Performed by: SURGERY

## 2024-02-21 PROCEDURE — 99999 PR PBB SHADOW E&M-EST. PATIENT-LVL III: CPT | Mod: PBBFAC,,, | Performed by: SURGERY

## 2024-02-21 PROCEDURE — 3008F BODY MASS INDEX DOCD: CPT | Mod: CPTII,S$GLB,, | Performed by: SURGERY

## 2024-02-21 PROCEDURE — 1159F MED LIST DOCD IN RCRD: CPT | Mod: CPTII,S$GLB,, | Performed by: SURGERY

## 2024-02-21 RX ORDER — SEMAGLUTIDE 0.25 MG/.5ML
0.25 INJECTION, SOLUTION SUBCUTANEOUS
Qty: 2 ML | Refills: 0 | Status: SHIPPED | OUTPATIENT
Start: 2024-02-21 | End: 2024-03-14

## 2024-02-21 NOTE — PROGRESS NOTES
Post Op Note    Surgery: gastric bypass surgery  Date: 5/1/23  Initial weight: 226  Last weight: 203  Current weight: 184    Reflux: none  Vomiting: none    Diet:  Breakfast:  protein pack  Lunch: anything from salad to left overs  Dinner: protein and vegetable  Snack: cucumber, watermelon, taco dip, wheat chips      Exercise:  Too busy    MVI: daily mvi, calcium    Vitals:    02/21/24 0840   BP: (!) 154/102   Pulse: 62   Resp: 16   Temp: 97.9 °F (36.6 °C)       Body comp:  Fat Percent:  40.3 %  Fat Mass:  74.4 lb  FFM:  110.0 lb  TBW: 78.4 lb  TBW %:  42.5 %  BMR: 1520 kcal    PE:  NAD  RRR  Soft/nt/nd    Labs: none    A/P: s/p gastric bypass     Ozempic- seems to be covered for weight loss by insurance, no thryoid malignancy hx, no pancreatic mass, no family hx of either or men    Counseling for patient:    Diet: track calories- aim for 800-1000  Exercise: get back to exercising  Vitamins: daily mvi     Co morbidities:     1. Morbid obesity  semaglutide, weight loss, (WEGOVY) 0.25 mg/0.5 mL PnIj      2. BMI 36.0-36.9,adult            -All above: Evaluated, monitored, and treated with diet and exercise program.

## 2024-02-23 ENCOUNTER — PATIENT MESSAGE (OUTPATIENT)
Dept: BARIATRICS | Facility: CLINIC | Age: 49
End: 2024-02-23
Payer: COMMERCIAL

## 2024-02-26 ENCOUNTER — OFFICE VISIT (OUTPATIENT)
Dept: FAMILY MEDICINE | Facility: CLINIC | Age: 49
End: 2024-02-26
Payer: COMMERCIAL

## 2024-02-26 DIAGNOSIS — I10 UNCONTROLLED HYPERTENSION: Primary | ICD-10-CM

## 2024-02-26 DIAGNOSIS — G47.00 INSOMNIA, UNSPECIFIED TYPE: ICD-10-CM

## 2024-02-26 PROCEDURE — 99213 OFFICE O/P EST LOW 20 MIN: CPT | Mod: 95,,, | Performed by: PHYSICIAN ASSISTANT

## 2024-02-26 PROCEDURE — 1159F MED LIST DOCD IN RCRD: CPT | Mod: CPTII,95,, | Performed by: PHYSICIAN ASSISTANT

## 2024-02-26 PROCEDURE — 1160F RVW MEDS BY RX/DR IN RCRD: CPT | Mod: CPTII,95,, | Performed by: PHYSICIAN ASSISTANT

## 2024-02-26 RX ORDER — TRAZODONE HYDROCHLORIDE 50 MG/1
TABLET ORAL
Qty: 30 TABLET | Refills: 11 | Status: SHIPPED | OUTPATIENT
Start: 2024-02-26

## 2024-02-26 RX ORDER — LOSARTAN POTASSIUM 25 MG/1
25 TABLET ORAL DAILY
Qty: 30 TABLET | Refills: 3 | Status: SHIPPED | OUTPATIENT
Start: 2024-02-26 | End: 2025-02-25

## 2024-02-26 NOTE — PROGRESS NOTES
The patient location is: [place]  The chief complaint leading to consultation is: HTN    Visit type: audiovisual    Face to Face time with patient: 20 minutes of total time spent on the encounter, which includes face to face time and non-face to face time preparing to see the patient (eg, review of tests), Obtaining and/or reviewing separately obtained history, Documenting clinical information in the electronic or other health record, Independently interpreting results (not separately reported) and communicating results to the patient/family/caregiver, or Care coordination (not separately reported).         Each patient to whom he or she provides medical services by telemedicine is:  (1) informed of the relationship between the physician and patient and the respective role of any other health care provider with respect to management of the patient; and (2) notified that he or she may decline to receive medical services by telemedicine and may withdraw from such care at any time.    Notes:  Presents with elevated blood pressure.  She states the last several times she is going to urgent care she has had elevated blood pressure and when she went to see Dr. Sampson last week her blood pressure was 154/102.  She denies any headache or chest pain and denies any history of elevated blood pressure in the past.  She has stopped exercising and there has been some new life stressors and she contributes this to the elevation in blood pressure.  Patient is also started having trouble sleeping.  She states she can not shut off her mind from racing at bedtime and only sleeps 1 or 2 hours at a time.  This is also new for the patient since losing a family member.  She would like something to help her sleep.  She denies any suicidal or homicidal ideations.      Current Outpatient Medications:     acetaminophen (TYLENOL EXTRA STRENGTH) 500 MG tablet, Take 1,000 mg by mouth every 6 (six) hours as needed for Pain., Disp: , Rfl:      losartan (COZAAR) 25 MG tablet, Take 1 tablet (25 mg total) by mouth once daily., Disp: 30 tablet, Rfl: 3    multivit-min/iron/folic acid/K (BARIATRIC MULTIVITAMINS ORAL), Take 1 capsule by mouth Daily., Disp: , Rfl:     ondansetron (ZOFRAN-ODT) 4 MG TbDL, Take 1 tablet (4 mg total) by mouth every 6 (six) hours as needed (nv)., Disp: 30 tablet, Rfl: 0    semaglutide, weight loss, (WEGOVY) 0.25 mg/0.5 mL PnIj, Inject 0.25 mg into the skin every 7 days., Disp: 2 mL, Rfl: 0    traZODone (DESYREL) 50 MG tablet, 1/2-1 tablet about 1-2 hours before bedtime, Disp: 30 tablet, Rfl: 11     Nano was seen today for follow-up.    Diagnoses and all orders for this visit:    Uncontrolled hypertension  -     losartan (COZAAR) 25 MG tablet; Take 1 tablet (25 mg total) by mouth once daily.    Insomnia, unspecified type  -     traZODone (DESYREL) 50 MG tablet; 1/2-1 tablet about 1-2 hours before bedtime     Patient to have nurse blood pressure check in 2 weeks    Answers submitted by the patient for this visit:  High Blood Pressure Questionnaire (Submitted on 2/26/2024)  Chief Complaint: Hypertension  Agents associated with hypertension: no associated agents  CAD risks: no known risk factors  Compliance problems: psychosocial issues  Past treatments: nothing  Improvement on treatment: no improvement

## 2024-02-26 NOTE — PATIENT INSTRUCTIONS
Jamil Mcgill,     If you are due for any health screening(s) below please notify me so we can arrange them to be ordered and scheduled. Most healthy patients at your age complete them, but you are free to accept or refuse.     If you can't do it, I'll definitely understand. If you can, I'd certainly appreciate it!    Tests to Keep You Healthy    Mammogram: SCHEDULED  Colon Cancer Screening: Met on 4/28/2023  Cervical Cancer Screening: DUE      Schedule your breast cancer screening today     Breast cancer is the second most common cancer in women,  and the second leading cause of death from cancer. Mammograms can detect breast cancer early, which significantly increases the chances of curing the cancer.       Our records indicate that you may be overdue for breast cancer screening. Cancer screenings save lives, so schedule yours today to stay healthy.     If you recently had a mammogram performed outside of [facility], please let your Health care team know so that they can update your health record.        Your cervical cancer screening is due     Our records indicate that you may be overdue for your screening Pap smear. A Pap smear is an important health screening that can detect abnormal cells that can become cervical cancer. Cervical cancer screenings allow for early diagnosis and increase the likelihood of successful treatment.     The current recommendation for Pap smear screening is every 3-5 years for women at average risk. We encourage you to schedule your appointment with your womens health provider. Many women see a gynecologist for this screening, but some primary care providers also provide Pap screening.     If you recently had your Pap smear screening performed outside of [facility], please let your health care team know so that they can update your health record.

## 2024-03-04 ENCOUNTER — PATIENT MESSAGE (OUTPATIENT)
Dept: SURGERY | Facility: HOSPITAL | Age: 49
End: 2024-03-04

## 2024-03-05 RX ORDER — TOPIRAMATE 25 MG/1
25 TABLET ORAL 2 TIMES DAILY
Qty: 60 TABLET | Refills: 0 | Status: SHIPPED | OUTPATIENT
Start: 2024-03-05 | End: 2024-06-19

## 2024-03-12 ENCOUNTER — CLINICAL SUPPORT (OUTPATIENT)
Dept: FAMILY MEDICINE | Facility: CLINIC | Age: 49
End: 2024-03-12
Payer: COMMERCIAL

## 2024-03-12 VITALS — SYSTOLIC BLOOD PRESSURE: 104 MMHG | DIASTOLIC BLOOD PRESSURE: 82 MMHG | HEART RATE: 55 BPM

## 2024-03-12 DIAGNOSIS — I10 HYPERTENSION, UNSPECIFIED TYPE: Primary | ICD-10-CM

## 2024-03-12 PROCEDURE — 99999 PR PBB SHADOW E&M-EST. PATIENT-LVL I: CPT | Mod: PBBFAC,,,

## 2024-03-14 ENCOUNTER — OFFICE VISIT (OUTPATIENT)
Dept: BARIATRICS | Facility: CLINIC | Age: 49
End: 2024-03-14
Payer: COMMERCIAL

## 2024-03-14 VITALS
HEIGHT: 66 IN | DIASTOLIC BLOOD PRESSURE: 93 MMHG | HEART RATE: 50 BPM | SYSTOLIC BLOOD PRESSURE: 150 MMHG | BODY MASS INDEX: 28.93 KG/M2 | WEIGHT: 180 LBS | RESPIRATION RATE: 16 BRPM

## 2024-03-14 DIAGNOSIS — G47.33 OSA (OBSTRUCTIVE SLEEP APNEA): ICD-10-CM

## 2024-03-14 DIAGNOSIS — E78.5 DYSLIPIDEMIA: ICD-10-CM

## 2024-03-14 DIAGNOSIS — E66.01 MORBID OBESITY: Primary | ICD-10-CM

## 2024-03-14 DIAGNOSIS — R11.0 NAUSEA: ICD-10-CM

## 2024-03-14 PROCEDURE — 99213 OFFICE O/P EST LOW 20 MIN: CPT | Mod: S$GLB,,, | Performed by: SURGERY

## 2024-03-14 PROCEDURE — 3008F BODY MASS INDEX DOCD: CPT | Mod: CPTII,S$GLB,, | Performed by: SURGERY

## 2024-03-14 PROCEDURE — 3080F DIAST BP >= 90 MM HG: CPT | Mod: CPTII,S$GLB,, | Performed by: SURGERY

## 2024-03-14 PROCEDURE — 99999 PR PBB SHADOW E&M-EST. PATIENT-LVL III: CPT | Mod: PBBFAC,,, | Performed by: SURGERY

## 2024-03-14 PROCEDURE — 4010F ACE/ARB THERAPY RXD/TAKEN: CPT | Mod: CPTII,S$GLB,, | Performed by: SURGERY

## 2024-03-14 PROCEDURE — 3077F SYST BP >= 140 MM HG: CPT | Mod: CPTII,S$GLB,, | Performed by: SURGERY

## 2024-03-14 RX ORDER — PHENTERMINE HYDROCHLORIDE 37.5 MG/1
18.75 TABLET ORAL
Qty: 15 TABLET | Refills: 0 | Status: SHIPPED | OUTPATIENT
Start: 2024-03-14 | End: 2024-04-13

## 2024-03-14 NOTE — PROGRESS NOTES
Post Op Note    Surgery: gastric bypass surgery  Date: 5/1/23  Initial weight: 226  Last weight: 184  Current weight: 180    Had abnormal side effects related to topamax  Doing about 800-1000 calories per day    Vitals:    03/14/24 1639   BP: (!) 150/93   Pulse: (!) 50   Resp: 16       Body comp:  Fat Percent:  38.8 %  Fat Mass:  69.8 lb  FFM:  110.2 lb  TBW: 72.8 lb  TBW %:  43.4 %  BMR: 1515 kcal    PE:  NAD  RRR  Soft/nt/nd    Labs: na    A/P: s/p gastric bypass    Topamax gave bad side effects  Ozempic not covered by insurance      Will try round of phentermine  Continue low carb dieting  As much exercise as possible    Co morbidities:     1. Morbid obesity  phentermine (ADIPEX-P) 37.5 mg tablet      2. BMI 36.0-36.9,adult        3. ADELIA (obstructive sleep apnea)        4. Dyslipidemia            -All above: Evaluated, monitored, and treated with diet and exercise program.

## 2024-03-15 RX ORDER — ONDANSETRON 4 MG/1
4 TABLET, ORALLY DISINTEGRATING ORAL EVERY 6 HOURS PRN
Qty: 30 TABLET | Refills: 0 | Status: SHIPPED | OUTPATIENT
Start: 2024-03-15 | End: 2024-04-15 | Stop reason: SDUPTHER

## 2024-04-15 DIAGNOSIS — R11.0 NAUSEA: ICD-10-CM

## 2024-04-17 RX ORDER — ONDANSETRON 4 MG/1
4 TABLET, ORALLY DISINTEGRATING ORAL EVERY 6 HOURS PRN
Qty: 30 TABLET | Refills: 0 | Status: SHIPPED | OUTPATIENT
Start: 2024-04-17 | End: 2024-04-30 | Stop reason: SDUPTHER

## 2024-04-26 ENCOUNTER — PATIENT MESSAGE (OUTPATIENT)
Dept: BARIATRICS | Facility: CLINIC | Age: 49
End: 2024-04-26
Payer: COMMERCIAL

## 2024-04-26 DIAGNOSIS — E66.01 MORBID OBESITY: Primary | ICD-10-CM

## 2024-04-29 RX ORDER — PHENTERMINE HYDROCHLORIDE 37.5 MG/1
18.75 TABLET ORAL
Qty: 45 TABLET | Refills: 0 | Status: SHIPPED | OUTPATIENT
Start: 2024-04-29 | End: 2024-06-19

## 2024-04-30 DIAGNOSIS — R11.0 NAUSEA: ICD-10-CM

## 2024-05-03 RX ORDER — ONDANSETRON 4 MG/1
4 TABLET, ORALLY DISINTEGRATING ORAL EVERY 6 HOURS PRN
Qty: 30 TABLET | Refills: 0 | Status: SHIPPED | OUTPATIENT
Start: 2024-05-03 | End: 2024-05-09 | Stop reason: SDUPTHER

## 2024-05-08 ENCOUNTER — TELEPHONE (OUTPATIENT)
Dept: BARIATRICS | Facility: CLINIC | Age: 49
End: 2024-05-08
Payer: COMMERCIAL

## 2024-05-09 DIAGNOSIS — R11.0 NAUSEA: ICD-10-CM

## 2024-05-10 RX ORDER — ONDANSETRON 4 MG/1
4 TABLET, ORALLY DISINTEGRATING ORAL EVERY 6 HOURS PRN
Qty: 30 TABLET | Refills: 0 | Status: SHIPPED | OUTPATIENT
Start: 2024-05-10

## 2024-05-13 ENCOUNTER — TELEPHONE (OUTPATIENT)
Dept: BARIATRICS | Facility: CLINIC | Age: 49
End: 2024-05-13
Payer: COMMERCIAL

## 2024-05-13 NOTE — TELEPHONE ENCOUNTER
pt called back and said that it was Sudhakar requesting early refill not her. I told her I totally understood and let us know when she really needed a refill of her Zofran. Pt understood

## 2024-05-17 ENCOUNTER — HOSPITAL ENCOUNTER (OUTPATIENT)
Dept: RADIOLOGY | Facility: CLINIC | Age: 49
Discharge: HOME | End: 2024-05-17
Attending: PHYSICIAN ASSISTANT
Payer: COMMERCIAL

## 2024-05-17 DIAGNOSIS — Z12.31 ENCOUNTER FOR SCREENING MAMMOGRAM FOR MALIGNANT NEOPLASM OF BREAST: ICD-10-CM

## 2024-05-17 PROCEDURE — 77063 BREAST TOMOSYNTHESIS BI: CPT | Mod: 26,,, | Performed by: RADIOLOGY

## 2024-05-17 PROCEDURE — 77067 SCR MAMMO BI INCL CAD: CPT | Mod: TC,PO

## 2024-05-17 PROCEDURE — 77067 SCR MAMMO BI INCL CAD: CPT | Mod: 26,,, | Performed by: RADIOLOGY

## 2024-06-19 ENCOUNTER — OFFICE VISIT (OUTPATIENT)
Dept: BARIATRICS | Facility: CLINIC | Age: 49
End: 2024-06-19
Payer: COMMERCIAL

## 2024-06-19 VITALS
HEART RATE: 63 BPM | HEIGHT: 66 IN | WEIGHT: 169.19 LBS | TEMPERATURE: 98 F | BODY MASS INDEX: 27.19 KG/M2 | DIASTOLIC BLOOD PRESSURE: 86 MMHG | RESPIRATION RATE: 16 BRPM | SYSTOLIC BLOOD PRESSURE: 140 MMHG

## 2024-06-19 DIAGNOSIS — E66.01 MORBID OBESITY: Primary | ICD-10-CM

## 2024-06-19 DIAGNOSIS — E78.5 DYSLIPIDEMIA: ICD-10-CM

## 2024-06-19 DIAGNOSIS — G47.33 OSA (OBSTRUCTIVE SLEEP APNEA): ICD-10-CM

## 2024-06-19 PROCEDURE — 99999 PR PBB SHADOW E&M-EST. PATIENT-LVL III: CPT | Mod: PBBFAC,,, | Performed by: SURGERY

## 2024-06-19 RX ORDER — SEMAGLUTIDE 1 MG/.5ML
1 INJECTION, SOLUTION SUBCUTANEOUS
Qty: 2 ML | Refills: 2 | Status: SHIPPED | OUTPATIENT
Start: 2024-06-19 | End: 2024-09-17

## 2024-06-19 NOTE — PROGRESS NOTES
Post Op Note    Surgery: gastric bypass surgery  Date: 5/1/23  Initial weight: 226  Last weight: 180  Current weight: 169    Constipation: none  Reflux: none  Vomiting: none    Diet:    Some cheats  But mainly smaller portions  Mainly protein and vegetables    Exercise:  none    MVI: daily mvi    Vitals:    06/19/24 1632   BP: (!) 140/86   Pulse: 63   Resp: 16   Temp: 98.1 °F (36.7 °C)       Body comp:  Fat Percent:  37.2 %  Fat Mass:  63 lb  FFM:  106.2 lb  TBW: 75 lb  TBW %:  44.3 %  BMR: 1454 kcal    PE:  NAD  RRR  Soft/nt/nd    Labs: pending     A/P: s/p gastric bypass    Phentermine- tried a whole   Topamax- gave bad side effects  Ozempic - found 1mg dose- will send to pharmacy- no hx of thryoid problems, no pancreatitis      Counseling for patient:    Diet: continue low carb dieting  Exercise: doing this at work   Vitamins: check labs   Co morbidities:     1. Morbid obesity        2. BMI 36.0-36.9,adult        3. ADELIA (obstructive sleep apnea)        4. Dyslipidemia            -All above: Evaluated, monitored, and treated with diet and exercise program.

## 2024-07-17 DIAGNOSIS — I10 UNCONTROLLED HYPERTENSION: ICD-10-CM

## 2024-07-17 RX ORDER — LOSARTAN POTASSIUM 25 MG/1
25 TABLET ORAL DAILY
Qty: 30 TABLET | Refills: 3 | Status: SHIPPED | OUTPATIENT
Start: 2024-07-17 | End: 2025-07-17

## 2024-08-19 ENCOUNTER — OFFICE VISIT (OUTPATIENT)
Dept: BARIATRICS | Facility: CLINIC | Age: 49
End: 2024-08-19
Payer: COMMERCIAL

## 2024-08-19 VITALS
TEMPERATURE: 98 F | RESPIRATION RATE: 16 BRPM | WEIGHT: 160 LBS | HEIGHT: 66 IN | SYSTOLIC BLOOD PRESSURE: 144 MMHG | HEART RATE: 56 BPM | DIASTOLIC BLOOD PRESSURE: 83 MMHG | BODY MASS INDEX: 25.71 KG/M2

## 2024-08-19 DIAGNOSIS — E66.01 MORBID OBESITY: Primary | ICD-10-CM

## 2024-08-19 PROCEDURE — 1160F RVW MEDS BY RX/DR IN RCRD: CPT | Mod: CPTII,S$GLB,, | Performed by: NURSE PRACTITIONER

## 2024-08-19 PROCEDURE — 3077F SYST BP >= 140 MM HG: CPT | Mod: CPTII,S$GLB,, | Performed by: NURSE PRACTITIONER

## 2024-08-19 PROCEDURE — 1159F MED LIST DOCD IN RCRD: CPT | Mod: CPTII,S$GLB,, | Performed by: NURSE PRACTITIONER

## 2024-08-19 PROCEDURE — 4010F ACE/ARB THERAPY RXD/TAKEN: CPT | Mod: CPTII,S$GLB,, | Performed by: NURSE PRACTITIONER

## 2024-08-19 PROCEDURE — 99999 PR PBB SHADOW E&M-EST. PATIENT-LVL III: CPT | Mod: PBBFAC,,, | Performed by: NURSE PRACTITIONER

## 2024-08-19 PROCEDURE — 3079F DIAST BP 80-89 MM HG: CPT | Mod: CPTII,S$GLB,, | Performed by: NURSE PRACTITIONER

## 2024-08-19 PROCEDURE — 99213 OFFICE O/P EST LOW 20 MIN: CPT | Mod: S$GLB,,, | Performed by: NURSE PRACTITIONER

## 2024-08-19 PROCEDURE — 3008F BODY MASS INDEX DOCD: CPT | Mod: CPTII,S$GLB,, | Performed by: NURSE PRACTITIONER

## 2024-08-19 RX ORDER — SEMAGLUTIDE 1 MG/.5ML
1 INJECTION, SOLUTION SUBCUTANEOUS
Qty: 2 ML | Refills: 1 | Status: SHIPPED | OUTPATIENT
Start: 2024-08-19 | End: 2024-11-17

## 2024-08-19 NOTE — PROGRESS NOTES
Post Op Note    Surgery: gastric bypass surgery  Date: 5/1/23  Initial weight: 226  Last weight: 169  Current weight: 160   Goal: 150    Constipation: none  Reflux: none  Vomiting: none    Diet:  Breakfast: protein packs (ham cheese nuts)  Lunch: protein pack, salad  Dinner: home  box (protein/veggies)  Some cheats- pasta and bread    Exercise:  None  Dad in hospital, throat cancer, for 1 month    MVI:   daily mvi    Vitals:    08/19/24 1411   BP: (!) 144/83   Pulse: (!) 56   Resp: 16   Temp: 98.1 °F (36.7 °C)       Body comp:  Fat Percent:  33.3 %  Fat Mass:  53 lb  FFM:  106.8 lb  TBW: 75 lb  TBW %:  46.9 %  BMR: 1445 kcal    PE:  NAD  RRR  Soft/nt/nd    Labs: pending     A/P: s/p gastric bypass      Wegovy - found 1mg dose- will send to pharmacy- no hx of thryoid problems, no pancreatitis  Will send refill   Discussed changing injection date to Fridays  GERD vs nausea- take Tums PRN before nausea medications, do not skip     Counseling for patient:  Falls Reviewed  Continue with prescribed medications  Tanita Scale Reviewed  Decreased in 10 lb fat  Increase 0.4 lb muscle  Diet: continue low carb dieting  No skipping meals  Increase to 70 gm/protein per day- ensure 60g/day minimum  Limit carbs  Exercise/ Activity  Increase as much as possible to ensure weight loss goal     Medical Weight Loss options  Previously [facility] completed PO treatment:  Phentermine- tried a whole tablet with minimal weight loss  Topamax- gave bad side effects  [facility] Injectables-  Initiated by Dr. Sampson  Currently on Wegovy 1 mg  Weekly injections on Sundays with more cheats through weekend  Discussed transitioning to Friday injections  Change in dose can cause headache- use Acetaminophen/Tylenol, not BC Powders  Reflux vs Nausea- take tums first, then Zofran if persists  1 mg refill given today with 1 additional refill      Co morbidities:     1. Morbid obesity  semaglutide, weight loss, (WEGOVY) 1 mg/0.5 mL PnIj          -All above:  Evaluated, monitored, and treated with diet and exercise program.

## 2024-09-13 DIAGNOSIS — R11.0 NAUSEA: ICD-10-CM

## 2024-09-13 RX ORDER — ONDANSETRON 4 MG/1
4 TABLET, ORALLY DISINTEGRATING ORAL EVERY 6 HOURS PRN
Qty: 30 TABLET | Refills: 0 | OUTPATIENT
Start: 2024-09-13

## 2024-09-13 RX ORDER — ONDANSETRON 4 MG/1
4 TABLET, ORALLY DISINTEGRATING ORAL EVERY 6 HOURS PRN
Qty: 30 TABLET | Refills: 0 | Status: SHIPPED | OUTPATIENT
Start: 2024-09-13

## 2024-09-30 ENCOUNTER — OFFICE VISIT (OUTPATIENT)
Dept: BARIATRICS | Facility: CLINIC | Age: 49
End: 2024-09-30
Payer: COMMERCIAL

## 2024-09-30 VITALS
DIASTOLIC BLOOD PRESSURE: 84 MMHG | TEMPERATURE: 98 F | HEART RATE: 59 BPM | RESPIRATION RATE: 16 BRPM | SYSTOLIC BLOOD PRESSURE: 152 MMHG | HEIGHT: 66 IN | WEIGHT: 148.81 LBS | BODY MASS INDEX: 23.92 KG/M2

## 2024-09-30 DIAGNOSIS — Z71.3 DIETARY COUNSELING AND SURVEILLANCE: Primary | ICD-10-CM

## 2024-09-30 DIAGNOSIS — Z98.84 HISTORY OF GASTRIC BYPASS: Chronic | ICD-10-CM

## 2024-09-30 DIAGNOSIS — Z13.21 ENCOUNTER FOR VITAMIN DEFICIENCY SCREENING: ICD-10-CM

## 2024-09-30 DIAGNOSIS — E46 PROTEIN DEFICIENCY: ICD-10-CM

## 2024-09-30 DIAGNOSIS — E78.5 DYSLIPIDEMIA: ICD-10-CM

## 2024-09-30 PROCEDURE — 1160F RVW MEDS BY RX/DR IN RCRD: CPT | Mod: CPTII,S$GLB,, | Performed by: NURSE PRACTITIONER

## 2024-09-30 PROCEDURE — 3008F BODY MASS INDEX DOCD: CPT | Mod: CPTII,S$GLB,, | Performed by: NURSE PRACTITIONER

## 2024-09-30 PROCEDURE — 4010F ACE/ARB THERAPY RXD/TAKEN: CPT | Mod: CPTII,S$GLB,, | Performed by: NURSE PRACTITIONER

## 2024-09-30 PROCEDURE — 3079F DIAST BP 80-89 MM HG: CPT | Mod: CPTII,S$GLB,, | Performed by: NURSE PRACTITIONER

## 2024-09-30 PROCEDURE — 99999 PR PBB SHADOW E&M-EST. PATIENT-LVL III: CPT | Mod: PBBFAC,,, | Performed by: NURSE PRACTITIONER

## 2024-09-30 PROCEDURE — 99212 OFFICE O/P EST SF 10 MIN: CPT | Mod: S$GLB,,, | Performed by: NURSE PRACTITIONER

## 2024-09-30 PROCEDURE — 3077F SYST BP >= 140 MM HG: CPT | Mod: CPTII,S$GLB,, | Performed by: NURSE PRACTITIONER

## 2024-09-30 PROCEDURE — 1159F MED LIST DOCD IN RCRD: CPT | Mod: CPTII,S$GLB,, | Performed by: NURSE PRACTITIONER

## 2024-10-02 ENCOUNTER — OFFICE VISIT (OUTPATIENT)
Dept: FAMILY MEDICINE | Facility: CLINIC | Age: 49
End: 2024-10-02
Payer: COMMERCIAL

## 2024-10-02 ENCOUNTER — PATIENT MESSAGE (OUTPATIENT)
Dept: FAMILY MEDICINE | Facility: CLINIC | Age: 49
End: 2024-10-02
Payer: COMMERCIAL

## 2024-10-02 DIAGNOSIS — F41.0 PANIC: Primary | ICD-10-CM

## 2024-10-02 PROBLEM — E66.9 OBESITY (BMI 35.0-39.9 WITHOUT COMORBIDITY): Status: RESOLVED | Noted: 2023-02-07 | Resolved: 2024-10-02

## 2024-10-02 PROBLEM — Z98.890: Status: RESOLVED | Noted: 2023-10-10 | Resolved: 2024-10-02

## 2024-10-02 PROCEDURE — 4010F ACE/ARB THERAPY RXD/TAKEN: CPT | Mod: CPTII,95,, | Performed by: PHYSICIAN ASSISTANT

## 2024-10-02 PROCEDURE — 99213 OFFICE O/P EST LOW 20 MIN: CPT | Mod: 95,,, | Performed by: PHYSICIAN ASSISTANT

## 2024-10-02 RX ORDER — HYDROXYZINE HYDROCHLORIDE 10 MG/1
10 TABLET, FILM COATED ORAL 3 TIMES DAILY PRN
Qty: 30 TABLET | Refills: 11 | Status: SHIPPED | OUTPATIENT
Start: 2024-10-02

## 2024-10-02 RX ORDER — BUSPIRONE HYDROCHLORIDE 5 MG/1
5 TABLET ORAL 2 TIMES DAILY
Qty: 60 TABLET | Refills: 11 | Status: SHIPPED | OUTPATIENT
Start: 2024-10-02 | End: 2025-10-02

## 2024-10-02 NOTE — PROGRESS NOTES
The patient location is: [place]  The chief complaint leading to consultation is: anxiety    Visit type: audiovisual    Face to Face time with patient: 15 minutes of total time spent on the encounter, which includes face to face time and non-face to face time preparing to see the patient (eg, review of tests), Obtaining and/or reviewing separately obtained history, Documenting clinical information in the electronic or other health record, Independently interpreting results (not separately reported) and communicating results to the patient/family/caregiver, or Care coordination (not separately reported).         Each patient to whom he or she provides medical services by telemedicine is:  (1) informed of the relationship between the physician and patient and the respective role of any other health care provider with respect to management of the patient; and (2) notified that he or she may decline to receive medical services by telemedicine and may withdraw from such care at any time.    Notes:  Patient states the symptoms started 3 months ago after her father was diagnosed with cancer and then her brother in law was found unresponsive and is in the ICU. She also states work is stressful and she feels her panic attacks coming back.  She was on what she believes was buspar several years ago daily which helped.  She denies any suicidal or homicidal ideations.  She does have an appointment with cardiology to rule out any other causes next week.     Nano was seen today for anxiety.    Diagnoses and all orders for this visit:    Panic  -     busPIRone (BUSPAR) 5 MG Tab; Take 1 tablet (5 mg total) by mouth 2 (two) times daily.  -     hydrOXYzine HCL (ATARAX) 10 MG Tab; Take 1 tablet (10 mg total) by mouth 3 (three) times daily as needed (panic attack).     Call if symptoms worsen or are not effective in the next 1-2 weeks  Answers submitted by the patient for this visit:  Review of Systems Questionnaire (Submitted on  10/2/2024)  activity change: No  unexpected weight change: No  neck pain: No  hearing loss: No  rhinorrhea: No  trouble swallowing: No  eye discharge: No  visual disturbance: No  chest tightness: No  wheezing: No  chest pain: Yes  palpitations: No  blood in stool: No  constipation: No  vomiting: No  diarrhea: No  polydipsia: No  polyuria: No  difficulty urinating: No  hematuria: No  menstrual problem: No  dysuria: No  joint swelling: No  arthralgias: No  headaches: Yes  weakness: No  confusion: No  dysphoric mood: No

## 2024-10-29 ENCOUNTER — OFFICE VISIT (OUTPATIENT)
Dept: CARDIOLOGY | Facility: CLINIC | Age: 49
End: 2024-10-29
Payer: COMMERCIAL

## 2024-10-29 VITALS
DIASTOLIC BLOOD PRESSURE: 87 MMHG | HEART RATE: 51 BPM | OXYGEN SATURATION: 98 % | SYSTOLIC BLOOD PRESSURE: 147 MMHG | WEIGHT: 153 LBS | BODY MASS INDEX: 24.59 KG/M2 | HEIGHT: 66 IN

## 2024-10-29 DIAGNOSIS — E08.65 DIABETES MELLITUS DUE TO UNDERLYING CONDITION WITH HYPERGLYCEMIA, WITHOUT LONG-TERM CURRENT USE OF INSULIN: ICD-10-CM

## 2024-10-29 DIAGNOSIS — I10 ESSENTIAL HYPERTENSION: ICD-10-CM

## 2024-10-29 DIAGNOSIS — E66.01 SEVERE OBESITY: ICD-10-CM

## 2024-10-29 DIAGNOSIS — E78.2 MIXED HYPERLIPIDEMIA: ICD-10-CM

## 2024-10-29 DIAGNOSIS — R94.31 NONSPECIFIC ABNORMAL ELECTROCARDIOGRAM (ECG) (EKG): ICD-10-CM

## 2024-10-29 DIAGNOSIS — R07.89 ATYPICAL CHEST PAIN: Primary | ICD-10-CM

## 2024-10-29 DIAGNOSIS — E78.5 DYSLIPIDEMIA: ICD-10-CM

## 2024-10-29 PROCEDURE — 99999 PR PBB SHADOW E&M-EST. PATIENT-LVL III: CPT | Mod: PBBFAC,,, | Performed by: INTERNAL MEDICINE

## 2024-10-31 ENCOUNTER — TELEPHONE (OUTPATIENT)
Dept: CARDIOLOGY | Facility: CLINIC | Age: 49
End: 2024-10-31
Payer: COMMERCIAL

## 2024-10-31 DIAGNOSIS — E78.5 DYSLIPIDEMIA: Primary | ICD-10-CM

## 2024-10-31 LAB
OHS QRS DURATION: 86 MS
OHS QTC CALCULATION: 431 MS

## 2024-11-13 ENCOUNTER — PATIENT MESSAGE (OUTPATIENT)
Dept: GASTROENTEROLOGY | Facility: CLINIC | Age: 49
End: 2024-11-13
Payer: COMMERCIAL

## 2024-11-13 ENCOUNTER — PATIENT MESSAGE (OUTPATIENT)
Dept: BARIATRICS | Facility: CLINIC | Age: 49
End: 2024-11-13
Payer: COMMERCIAL

## 2024-11-14 ENCOUNTER — PATIENT MESSAGE (OUTPATIENT)
Dept: BARIATRICS | Facility: CLINIC | Age: 49
End: 2024-11-14
Payer: COMMERCIAL

## 2024-11-18 ENCOUNTER — PATIENT MESSAGE (OUTPATIENT)
Dept: SURGERY | Facility: CLINIC | Age: 49
End: 2024-11-18
Payer: COMMERCIAL

## 2024-11-19 ENCOUNTER — OFFICE VISIT (OUTPATIENT)
Dept: BARIATRICS | Facility: CLINIC | Age: 49
End: 2024-11-19
Payer: COMMERCIAL

## 2024-11-19 VITALS
RESPIRATION RATE: 16 BRPM | BODY MASS INDEX: 24.56 KG/M2 | TEMPERATURE: 98 F | SYSTOLIC BLOOD PRESSURE: 146 MMHG | WEIGHT: 152.81 LBS | HEIGHT: 66 IN | HEART RATE: 49 BPM | DIASTOLIC BLOOD PRESSURE: 71 MMHG

## 2024-11-19 DIAGNOSIS — E66.3 OVERWEIGHT (BMI 25.0-29.9): Primary | ICD-10-CM

## 2024-11-19 DIAGNOSIS — E66.01 MORBID OBESITY: ICD-10-CM

## 2024-11-19 PROCEDURE — 4010F ACE/ARB THERAPY RXD/TAKEN: CPT | Mod: CPTII,S$GLB,, | Performed by: NURSE PRACTITIONER

## 2024-11-19 PROCEDURE — 3078F DIAST BP <80 MM HG: CPT | Mod: CPTII,S$GLB,, | Performed by: NURSE PRACTITIONER

## 2024-11-19 PROCEDURE — 99213 OFFICE O/P EST LOW 20 MIN: CPT | Mod: S$GLB,,, | Performed by: NURSE PRACTITIONER

## 2024-11-19 PROCEDURE — 1160F RVW MEDS BY RX/DR IN RCRD: CPT | Mod: CPTII,S$GLB,, | Performed by: NURSE PRACTITIONER

## 2024-11-19 PROCEDURE — 3008F BODY MASS INDEX DOCD: CPT | Mod: CPTII,S$GLB,, | Performed by: NURSE PRACTITIONER

## 2024-11-19 PROCEDURE — 99999 PR PBB SHADOW E&M-EST. PATIENT-LVL III: CPT | Mod: PBBFAC,,, | Performed by: NURSE PRACTITIONER

## 2024-11-19 PROCEDURE — 1159F MED LIST DOCD IN RCRD: CPT | Mod: CPTII,S$GLB,, | Performed by: NURSE PRACTITIONER

## 2024-11-19 PROCEDURE — 3077F SYST BP >= 140 MM HG: CPT | Mod: CPTII,S$GLB,, | Performed by: NURSE PRACTITIONER

## 2024-11-19 RX ORDER — SEMAGLUTIDE 1 MG/.5ML
1 INJECTION, SOLUTION SUBCUTANEOUS
Qty: 2 ML | Refills: 1 | Status: SHIPPED | OUTPATIENT
Start: 2024-11-19 | End: 2025-02-17

## 2024-11-19 NOTE — PROGRESS NOTES
Post Op Note    Surgery: gastric bypass surgery  Date: 5/1/23  Initial weight: 226  Last weight: 149  Current weight: 152   Goal: 150    Constipation: none  Reflux: none  Vomiting: none  Nausea: after eating, when overfill    Diet:  Breakfast: shrimp, motzerella, tomato, balsamic- P3  Lunch: shrimp salad, tuna salad, cucumbers on lettuce wrap  Dinner:  box lunch  Water- 60-80 oz      Exercise:  Last week started wall jace     MVI:   daily mvi with iron  B12 1/2 tablet  No calcium (> 1 month)    Vitals:    11/19/24 1305   BP: (!) 146/71   Pulse: (!) 49   Resp: 16   Temp: 98 °F (36.7 °C)         Body comp:  Fat Percent:  24.5 %  Fat Mass:  37.4 lb  FFM:  115.4 lb  TBW: 80.8 lb  TBW %:  52.9 %  BMR: 1522 kcal    PE:  NAD  RRR  Soft/nt/nd    Labs: ordered in September    A/P: s/p gastric bypass     Counseling for patient:  Falls Reviewed  Continue with prescribed medications  Tanita Scale Reviewed  Decreased in 10 lb fat  Increase 13 lb muscle  Increased 9 lb water  Diet: continue low carb dieting  No skipping meals  Increase to 70 gm/protein per day- ensure 60g/day minimum  Limit carbs  Exercise/ Activity  Increase as much as possible to ensure weight loss goa  GERD vs nausea- take Tums PRN before nausea medications, do not skip    Medical Weight Loss options  Previously [facility] completed PO treatment:  Phentermine- tried a whole tablet with minimal weight loss  Topamax- gave bad side effects  [facility] Injectables-  Initiated by Dr. Sampson  Currently on Wegovy 1 mg  Weekly injections on Sundays with more cheats through weekend  Discussed transitioning to Friday injections  Change in dose can cause headache- use Acetaminophen/Tylenol, not BC Powders  Reflux vs Nausea- take tums first, then Zofran if persists  1 mg refill given today with 1 additional refill  9/30/24-   Discussion of goal weight reached, transition to maintenance   Continue at same dose, extend injections to every 10 days  Has 1 refill still at pharmacy,  will send message to staff for refills    Today, 11/19/24  Recent Rx given, difficulties with prior auth- has not had in 2 weeks  PA received on 11/18/24 see Media in EPIC  Will Transfer to [facility]    Co morbidities:     1. Overweight (BMI 25.0-29.9)        2. Morbid obesity            -All above: Evaluated, monitored, and treated with diet and exercise program.

## 2024-12-06 DIAGNOSIS — I10 UNCONTROLLED HYPERTENSION: ICD-10-CM

## 2024-12-06 RX ORDER — LOSARTAN POTASSIUM 25 MG/1
TABLET ORAL
Qty: 30 TABLET | Refills: 5 | Status: SHIPPED | OUTPATIENT
Start: 2024-12-06

## 2024-12-26 DIAGNOSIS — R11.0 NAUSEA: ICD-10-CM

## 2024-12-27 RX ORDER — ONDANSETRON 4 MG/1
TABLET, ORALLY DISINTEGRATING ORAL
Qty: 30 TABLET | Refills: 0 | Status: SHIPPED | OUTPATIENT
Start: 2024-12-27

## 2025-02-10 ENCOUNTER — LAB VISIT (OUTPATIENT)
Dept: LAB | Facility: HOSPITAL | Age: 50
End: 2025-02-10
Attending: NURSE PRACTITIONER
Payer: COMMERCIAL

## 2025-02-10 DIAGNOSIS — Z98.84 HISTORY OF GASTRIC BYPASS: Chronic | ICD-10-CM

## 2025-02-10 DIAGNOSIS — E78.5 DYSLIPIDEMIA: ICD-10-CM

## 2025-02-10 DIAGNOSIS — Z13.21 ENCOUNTER FOR VITAMIN DEFICIENCY SCREENING: ICD-10-CM

## 2025-02-10 DIAGNOSIS — E46 PROTEIN DEFICIENCY: ICD-10-CM

## 2025-02-10 LAB
25(OH)D3+25(OH)D2 SERPL-MCNC: 16 NG/ML (ref 30–96)
ALBUMIN SERPL BCP-MCNC: 3.6 G/DL (ref 3.5–5.2)
ALP SERPL-CCNC: 75 U/L (ref 40–150)
ALT SERPL W/O P-5'-P-CCNC: 20 U/L (ref 10–44)
ANION GAP SERPL CALC-SCNC: 9 MMOL/L (ref 8–16)
AST SERPL-CCNC: 22 U/L (ref 10–40)
BASOPHILS # BLD AUTO: 0.04 K/UL (ref 0–0.2)
BASOPHILS NFR BLD: 1 % (ref 0–1.9)
BILIRUB SERPL-MCNC: 1.4 MG/DL (ref 0.1–1)
BUN SERPL-MCNC: 13 MG/DL (ref 6–20)
CALCIUM SERPL-MCNC: 8.9 MG/DL (ref 8.7–10.5)
CHLORIDE SERPL-SCNC: 108 MMOL/L (ref 95–110)
CHOLEST SERPL-MCNC: 162 MG/DL (ref 120–199)
CHOLEST/HDLC SERPL: 2.7 {RATIO} (ref 2–5)
CO2 SERPL-SCNC: 26 MMOL/L (ref 23–29)
CREAT SERPL-MCNC: 0.7 MG/DL (ref 0.5–1.4)
DIFFERENTIAL METHOD BLD: ABNORMAL
EOSINOPHIL # BLD AUTO: 0.1 K/UL (ref 0–0.5)
EOSINOPHIL NFR BLD: 3.4 % (ref 0–8)
ERYTHROCYTE [DISTWIDTH] IN BLOOD BY AUTOMATED COUNT: 13.4 % (ref 11.5–14.5)
EST. GFR  (NO RACE VARIABLE): >60 ML/MIN/1.73 M^2
GLUCOSE SERPL-MCNC: 95 MG/DL (ref 70–110)
HCT VFR BLD AUTO: 41.6 % (ref 37–48.5)
HDLC SERPL-MCNC: 60 MG/DL (ref 40–75)
HDLC SERPL: 37 % (ref 20–50)
HGB BLD-MCNC: 13.4 G/DL (ref 12–16)
IMM GRANULOCYTES # BLD AUTO: 0.01 K/UL (ref 0–0.04)
IMM GRANULOCYTES NFR BLD AUTO: 0.3 % (ref 0–0.5)
IRON SERPL-MCNC: 205 UG/DL (ref 30–160)
LDLC SERPL CALC-MCNC: 86.8 MG/DL (ref 63–159)
LYMPHOCYTES # BLD AUTO: 1.5 K/UL (ref 1–4.8)
LYMPHOCYTES NFR BLD: 39.3 % (ref 18–48)
MCH RBC QN AUTO: 31.4 PG (ref 27–31)
MCHC RBC AUTO-ENTMCNC: 32.2 G/DL (ref 32–36)
MCV RBC AUTO: 97 FL (ref 82–98)
MONOCYTES # BLD AUTO: 0.3 K/UL (ref 0.3–1)
MONOCYTES NFR BLD: 8.4 % (ref 4–15)
NEUTROPHILS # BLD AUTO: 1.8 K/UL (ref 1.8–7.7)
NEUTROPHILS NFR BLD: 47.6 % (ref 38–73)
NONHDLC SERPL-MCNC: 102 MG/DL
NRBC BLD-RTO: 0 /100 WBC
PLATELET # BLD AUTO: 216 K/UL (ref 150–450)
PMV BLD AUTO: 10.3 FL (ref 9.2–12.9)
POTASSIUM SERPL-SCNC: 3.6 MMOL/L (ref 3.5–5.1)
PROT SERPL-MCNC: 7.3 G/DL (ref 6–8.4)
RBC # BLD AUTO: 4.27 M/UL (ref 4–5.4)
SATURATED IRON: 55 % (ref 20–50)
SODIUM SERPL-SCNC: 143 MMOL/L (ref 136–145)
TOTAL IRON BINDING CAPACITY: 375 UG/DL (ref 250–450)
TRANSFERRIN SERPL-MCNC: 268 MG/DL (ref 200–375)
TRIGL SERPL-MCNC: 76 MG/DL (ref 30–150)
VIT B12 SERPL-MCNC: 204 PG/ML (ref 210–950)
WBC # BLD AUTO: 3.82 K/UL (ref 3.9–12.7)

## 2025-02-10 PROCEDURE — 85025 COMPLETE CBC W/AUTO DIFF WBC: CPT | Performed by: NURSE PRACTITIONER

## 2025-02-10 PROCEDURE — 83540 ASSAY OF IRON: CPT | Performed by: NURSE PRACTITIONER

## 2025-02-10 PROCEDURE — 84425 ASSAY OF VITAMIN B-1: CPT | Performed by: NURSE PRACTITIONER

## 2025-02-10 PROCEDURE — 82607 VITAMIN B-12: CPT | Performed by: NURSE PRACTITIONER

## 2025-02-10 PROCEDURE — 80053 COMPREHEN METABOLIC PANEL: CPT | Performed by: NURSE PRACTITIONER

## 2025-02-10 PROCEDURE — 80061 LIPID PANEL: CPT | Performed by: NURSE PRACTITIONER

## 2025-02-10 PROCEDURE — 82306 VITAMIN D 25 HYDROXY: CPT | Performed by: NURSE PRACTITIONER

## 2025-02-11 ENCOUNTER — PATIENT MESSAGE (OUTPATIENT)
Dept: BARIATRICS | Facility: CLINIC | Age: 50
End: 2025-02-11

## 2025-02-11 ENCOUNTER — OFFICE VISIT (OUTPATIENT)
Dept: BARIATRICS | Facility: CLINIC | Age: 50
End: 2025-02-11
Payer: COMMERCIAL

## 2025-02-11 VITALS
HEIGHT: 66 IN | SYSTOLIC BLOOD PRESSURE: 142 MMHG | WEIGHT: 142.38 LBS | DIASTOLIC BLOOD PRESSURE: 85 MMHG | TEMPERATURE: 98 F | BODY MASS INDEX: 22.88 KG/M2 | RESPIRATION RATE: 16 BRPM | HEART RATE: 59 BPM

## 2025-02-11 DIAGNOSIS — E66.811 CLASS 1 OBESITY DUE TO EXCESS CALORIES WITHOUT SERIOUS COMORBIDITY WITH BODY MASS INDEX (BMI) OF 30.0 TO 30.9 IN ADULT: Chronic | ICD-10-CM

## 2025-02-11 DIAGNOSIS — Z98.84 HX OF BARIATRIC SURGERY: ICD-10-CM

## 2025-02-11 DIAGNOSIS — E66.09 CLASS 1 OBESITY DUE TO EXCESS CALORIES WITHOUT SERIOUS COMORBIDITY WITH BODY MASS INDEX (BMI) OF 30.0 TO 30.9 IN ADULT: Chronic | ICD-10-CM

## 2025-02-11 DIAGNOSIS — K21.9 GASTROESOPHAGEAL REFLUX DISEASE, UNSPECIFIED WHETHER ESOPHAGITIS PRESENT: Primary | ICD-10-CM

## 2025-02-11 DIAGNOSIS — Z71.3 DIETARY COUNSELING AND SURVEILLANCE: ICD-10-CM

## 2025-02-11 DIAGNOSIS — E55.9 VITAMIN D INSUFFICIENCY: ICD-10-CM

## 2025-02-11 PROCEDURE — 3079F DIAST BP 80-89 MM HG: CPT | Mod: CPTII,S$GLB,, | Performed by: NURSE PRACTITIONER

## 2025-02-11 PROCEDURE — 3077F SYST BP >= 140 MM HG: CPT | Mod: CPTII,S$GLB,, | Performed by: NURSE PRACTITIONER

## 2025-02-11 PROCEDURE — 1159F MED LIST DOCD IN RCRD: CPT | Mod: CPTII,S$GLB,, | Performed by: NURSE PRACTITIONER

## 2025-02-11 PROCEDURE — 3008F BODY MASS INDEX DOCD: CPT | Mod: CPTII,S$GLB,, | Performed by: NURSE PRACTITIONER

## 2025-02-11 PROCEDURE — 99213 OFFICE O/P EST LOW 20 MIN: CPT | Mod: S$GLB,,, | Performed by: NURSE PRACTITIONER

## 2025-02-11 PROCEDURE — 99999 PR PBB SHADOW E&M-EST. PATIENT-LVL III: CPT | Mod: PBBFAC,,, | Performed by: NURSE PRACTITIONER

## 2025-02-11 RX ORDER — ERGOCALCIFEROL 1.25 MG/1
50000 CAPSULE ORAL
Qty: 12 CAPSULE | Refills: 0 | Status: SHIPPED | OUTPATIENT
Start: 2025-02-11 | End: 2025-04-30

## 2025-02-11 RX ORDER — PHENTERMINE HYDROCHLORIDE 37.5 MG/1
18.75 TABLET ORAL
Qty: 45 TABLET | Refills: 0 | Status: SHIPPED | OUTPATIENT
Start: 2025-02-11 | End: 2025-05-12

## 2025-02-11 NOTE — PROGRESS NOTES
Post Op Note    Surgery: gastric bypass surgery  Date: 5/1/23  Initial weight: 226  Last weight: 152  Current weight: 142   Goal: 150    Constipation: none  Reflux: none  Vomiting: none  Nausea: after eating, when overfill    Diet:  Breakfast: Coffee with atkins shake, Yogurt- Oui (1/2 at time)  Snack: Kind bar (1/2)  Lunch: shrimp salad, tuna salad, cucumbers on lettuce wrap  Dinner:  Protein Pack- Factor meal  Water- 60-80 oz    Exercise:  limited    MVI:   daily mvi with iron  B12 1/2 tablet  No calcium (for few month)    Vitals:    02/11/25 0834   BP: (!) 142/85   Pulse: (!) 59   Resp: 16   Temp: 98.3 °F (36.8 °C)           Body comp:  Fat Percent:  29.2 %  Fat Mass:  41.6 lb  FFM:  100.8 lb  TBW: 70 lb  TBW %:  49.2 %  BMR: 1354 kcal    PE:  NAD  RRR  Soft/nt/nd    Labs: reviewed from Yesterday    A/P: s/p gastric bypass     Counseling for patient:  Falls Reviewed  Continue with prescribed home medications  Tanita Body Scale Reviewed  Increased in 4 lb fat  Decrease 5 lb muscle  Decreased 10 lb water  Diet: continue low carb dieting  No skipping meals  Increase to 70 gm/protein per day- ensure 60g/day minimum  Limit carbs  Exercise/ Activity  Increase as much as possible to ensure weight loss goa  GERD vs nausea- take Tums PRN before nausea medications, do not skip    Medical Weight Loss options  Previously [facility] completed PO treatment:  Phentermine- tried a whole tablet with minimal weight loss  Topamax- gave bad side effects  [facility] Injectables-  Initiated by Dr. Sampson  Currently on Wegovy 1 mg  Weekly injections on Sundays with more cheats through weekend  Discussed transitioning to Friday injections  Change in dose can cause headache- use Acetaminophen/Tylenol, not BC Powders  Reflux vs Nausea- take tums first, then Zofran if persists  1 mg refill given today with 1 additional refill  9/30/24-   Discussion of goal weight reached, transition to maintenance   Continue at same dose, extend injections to every 10  days  Has 1 refill still at pharmacy, will send message to staff for refills    11/19/24  Recent Wegovy Rx given, difficulties with prior auth- has not had in 2 weeks  PA received on 11/18/24 see Media in EPIC  Will Transfer to [facility]    Changes for today, 2/11/2025  Labs reviewed- Trend Bili  Swap B12 for B complex  Add Calcium + Vitamin D into vitamin regimen  Rx in Vitamin D  Dc'd Wegovy- d/t increase cost this year ($1100/month, from insurance deductible)  Start Phentermine 1/2 tablet (18.75 mg) qday-- aware only 6 months will be prescribed  F/U in 3 months    Co morbidities:     1. Gastroesophageal reflux disease, unspecified whether esophagitis present        2. Dietary counseling and surveillance        3. Hx of bariatric surgery        4. Class 1 obesity due to excess calories without serious comorbidity with body mass index (BMI) of 30.0 to 30.9 in adult  phentermine (ADIPEX-P) 37.5 mg tablet      5. Vitamin D insufficiency  ergocalciferol (ERGOCALCIFEROL) 50,000 unit Cap            -All above: Evaluated, monitored, and treated with diet and exercise program.

## 2025-02-13 LAB — VIT B1 BLD-MCNC: 51 UG/L (ref 38–122)

## 2025-04-11 ENCOUNTER — PATIENT MESSAGE (OUTPATIENT)
Dept: FAMILY MEDICINE | Facility: CLINIC | Age: 50
End: 2025-04-11
Payer: COMMERCIAL

## 2025-04-14 ENCOUNTER — OFFICE VISIT (OUTPATIENT)
Dept: FAMILY MEDICINE | Facility: CLINIC | Age: 50
End: 2025-04-14
Payer: COMMERCIAL

## 2025-04-14 ENCOUNTER — RESULTS FOLLOW-UP (OUTPATIENT)
Dept: FAMILY MEDICINE | Facility: CLINIC | Age: 50
End: 2025-04-14

## 2025-04-14 VITALS
DIASTOLIC BLOOD PRESSURE: 88 MMHG | BODY MASS INDEX: 22.78 KG/M2 | SYSTOLIC BLOOD PRESSURE: 142 MMHG | HEIGHT: 66 IN | WEIGHT: 141.75 LBS | TEMPERATURE: 98 F | OXYGEN SATURATION: 97 % | HEART RATE: 54 BPM

## 2025-04-14 DIAGNOSIS — Z82.49 FAMILY HISTORY OF ISCHEMIC HEART DISEASE (IHD): ICD-10-CM

## 2025-04-14 DIAGNOSIS — R07.89 CHEST WALL PAIN: Primary | ICD-10-CM

## 2025-04-14 LAB
OHS QRS DURATION: 90 MS
OHS QTC CALCULATION: 418 MS

## 2025-04-14 PROCEDURE — 3079F DIAST BP 80-89 MM HG: CPT | Mod: CPTII,S$GLB,, | Performed by: NURSE PRACTITIONER

## 2025-04-14 PROCEDURE — 99999 PR PBB SHADOW E&M-EST. PATIENT-LVL III: CPT | Mod: PBBFAC,,, | Performed by: NURSE PRACTITIONER

## 2025-04-14 PROCEDURE — 1159F MED LIST DOCD IN RCRD: CPT | Mod: CPTII,S$GLB,, | Performed by: NURSE PRACTITIONER

## 2025-04-14 PROCEDURE — 99215 OFFICE O/P EST HI 40 MIN: CPT | Mod: S$GLB,,, | Performed by: NURSE PRACTITIONER

## 2025-04-14 PROCEDURE — 3077F SYST BP >= 140 MM HG: CPT | Mod: CPTII,S$GLB,, | Performed by: NURSE PRACTITIONER

## 2025-04-14 PROCEDURE — 93005 ELECTROCARDIOGRAM TRACING: CPT | Mod: S$GLB,,, | Performed by: NURSE PRACTITIONER

## 2025-04-14 PROCEDURE — 3008F BODY MASS INDEX DOCD: CPT | Mod: CPTII,S$GLB,, | Performed by: NURSE PRACTITIONER

## 2025-04-14 PROCEDURE — 93010 ELECTROCARDIOGRAM REPORT: CPT | Mod: S$GLB,,, | Performed by: INTERNAL MEDICINE

## 2025-04-14 RX ORDER — HYDROCODONE BITARTRATE AND ACETAMINOPHEN 5; 325 MG/1; MG/1
1 TABLET ORAL
COMMUNITY
Start: 2025-02-12 | End: 2025-04-14 | Stop reason: ALTCHOICE

## 2025-04-14 RX ORDER — IBUPROFEN 600 MG/1
600 TABLET ORAL
COMMUNITY
Start: 2025-02-12 | End: 2025-04-14 | Stop reason: ALTCHOICE

## 2025-04-14 RX ORDER — METHOCARBAMOL 750 MG/1
750 TABLET, FILM COATED ORAL EVERY 8 HOURS
COMMUNITY
Start: 2025-02-12 | End: 2025-04-14 | Stop reason: ALTCHOICE

## 2025-04-14 RX ORDER — CLONIDINE HYDROCHLORIDE 0.1 MG/1
0.1 TABLET ORAL EVERY 6 HOURS PRN
Qty: 60 TABLET | Refills: 0 | Status: SHIPPED | OUTPATIENT
Start: 2025-04-14 | End: 2025-05-14

## 2025-04-14 NOTE — PROGRESS NOTES
This dictation has been generated using Modal Fluency Dictation some phonetic errors may occur. Please contact author for clarification if needed.     1. Chest wall pain  -     IN OFFICE EKG 12-LEAD (to Muse)  -     X-Ray Chest PA And Lateral; Future; Expected date: 04/14/2025    2. Family history of ischemic heart disease (IHD)  -     IN OFFICE EKG 12-LEAD (to Muse)  -     X-Ray Chest PA And Lateral; Future; Expected date: 04/14/2025    Other orders  -     cloNIDine (CATAPRES) 0.1 MG tablet; Take 1 tablet (0.1 mg total) by mouth every 6 (six) hours as needed (hot flash or blood pressure >140 systolic).  Dispense: 60 tablet; Refill: 0       Assessment & Plan    IMPRESSION:  - Elevated BP, considering potential contributing factors including job stress, menopause, and recent increase in alcohol consumption.  - Chest pain likely musculoskeletal in nature.  - Family history of heart disease and past smoking history.    ESSENTIAL HYPERTENSION:  - Instructed the patient to take 1 tablet of clonidine when systolic blood pressure elevation is greater than 140.  - Ms. White has been checking blood pressure every other day for about a month using a home machine.  - Blood pressure has been steady but the patient feels it rising when stressed at work.  - Explained the relationship between alcohol consumption and blood pressure.  - Advised the patient to modify stress.  - Discussed the impact of salt intake on BP, noting decreased salt tolerance with age.    MENOPAUSAL:  - Acknowledged that menopause can cause blood pressure to increase due to vasodilation and flushing.  - Noted the patient reports starting menopause about 9 months ago, which coincided with blood pressure increase.  - Evaluated the patient's experience of hot flashes associated with menopause.    CHEST PAIN:  - Monitored the patient's report of experiencing chest pain, particularly after physical exertion at work.  - Evaluated the pain location across the chest  in two muscles, with no associated shortness of breath.  - Performed physical exam of chest wall.    PALPITATIONS:  - Considered potential causes of palpitations, including stimulant medication. Limit all stimulants    FAMILY HISTORY OF HEART DISEASE:  - Noted mother has extensive history of heart disease, including myocardial infarctions, open heart surgery, pacemaker, defibrillator, and multiple stents.  - Acknowledged family history and its potential impact on patient's health concerns.    HISTORY OF NICOTINE DEPENDENCE:  - Monitored the patient's history of smoking, with periods of cessation and relapse.  - Acknowledged the patient's current smoke-free status.    LIFESTYLE-RELATED PROBLEMS:  - Monitored the patient's report of increased alcohol consumption due to work stress.  - Advised the patient to limit alcohol consumption and implement stress management techniques.         EKG sinus Rohit per my interpretation ST changes nonspecific noted.  When compared to EKG October 2024 T-wave abnormality in anterior leads resolved.  Nonspecific T-waves present today and bradycardia.  Technically abnormal EKG however presentation clearly musculoskeletal etiology.  Patient needs to follow-up routinely with cardiology and proceed with stress testing as previously advised.  Recommended Tylenol for chest wall pain.      I will review all results and address accordingly.   No follow-ups on file.    ________________________________________________________________  ________________________________________________________________      No chief complaint on file.   Elevated bp. Hot flashes/flushing.   History of present illness  History of Present Illness    HPI:  Ms. White presents with concerns about elevated blood pressure and occasional chest discomfort. Ms. White reports blood pressure increase over the past 9 months, coinciding with menopause onset. She attributes this to work-related stress and hormonal changes. She has  occasional chest discomfort, described as pain across two muscles in her chest area, typically associated with physical exertion at work, such as lifting and moving objects. She also reports occasional palpitations, particularly when supine. She has been monitoring her blood pressure at home for about a month, checking it every other day. While readings have been steady, she notes feeling her blood pressure rise when stressed at work, manifesting as sensations in her ears. She has a family history of heart disease. She has been taking phentermine, which she believes may be contributing to the palpitations. In response, she has reduced her dosage to half a pill every other day instead of daily. This was helpful. She has also been having menopausal hot flashes. She was evaluated by a cardiologist, Dr. Brush, in October. She has increased alcohol consumption of late after work to cope with stress.    She denies headaches, shortness of breath, breast problems or complaints.    MEDICATIONS:  Ms. White is on Phentermine for weight loss, taking half a tablet in the morning every other day. She reports occasional palpitations as a side effect.    MEDICAL HISTORY:  Ms. White has a history of hypertension, which began 9 months ago, coinciding with the onset of her menopause. Her menopause started 9 months ago. Ms. White is menopausal, with the onset occurring 9 months ago.    FAMILY HISTORY:  Family history is significant for mother who had her first heart attack at 39, followed by open heart surgery at 42. Mother has a pacemaker, defibrillator, and estimates about 15 stents. She is currently 70 years old.    TEST RESULTS:  Ms. White recently had orders placed for a stress test.  Mammogram 2024 acceptable. UTD. No breast complaints.    SOCIAL HISTORY:  Smoking: Quit for 7-8 years, relapsed for about a year, currently smoke-free for about 2 years  Alcohol: Recently increased consumption, mentions having beer after  work  Caffeine: 1 cup of coffee in the morning, drinks diet Crystal Light tea Occupation: Works in underground waterworks, runs two branches  Marital status:       ROS:  Cardiovascular: +palpitations, +feeling of flutter in chest. No SOB or BARKSD[place].   Musculoskeletal: +muscle pain  Psychiatric: +increased stressors  Endocrine: +hot flashes         Past Medical History:   Diagnosis Date    H/O gastric banding 2014    History of removal of laparoscopic gastric banding device 09/12/2022    Obstructive sleep apnea     S/P gastric surgery 03/09/2016       Past Surgical History:   Procedure Laterality Date    BREAST REDUCTION      BREAST SURGERY      COLONOSCOPY N/A 04/28/2023    Procedure: COLONOSCOPY;  Surgeon: Sharno Cobos MD;  Location: [facility] ENDO;  Service: Endoscopy;  Laterality: N/A;  lm/ppm    DIAGNOSTIC LAPAROSCOPY N/A 10/09/2023    Procedure: LAPAROSCOPY, DIAGNOSTIC;  Surgeon: Paresh Sampson MD;  Location: [facility] OR;  Service: General;  Laterality: N/A;    KNEE SURGERY Bilateral     SCOPE    LAPAROSCOPIC GASTRIC BANDING      LAPAROSCOPIC LYSIS OF ADHESIONS N/A 10/09/2023    Procedure: LYSIS, ADHESIONS, LAPAROSCOPIC;  Surgeon: Paresh Sampson MD;  Location: [facility] OR;  Service: General;  Laterality: N/A;    LAPAROSCOPIC REDUCTION OF INTUSSUSCEPTION OF INTESTINE N/A 10/09/2023    Procedure: REDUCTION, INTUSSUSCEPTION, INTESTINE, LAPAROSCOPIC;  Surgeon: Paresh Sampson MD;  Location: [facility] OR;  Service: General;  Laterality: N/A;    LAPAROSCOPIC REMOVAL OF GASTRIC BAND N/A 08/19/2022    Procedure: REMOVAL, GASTRIC BAND, LAPAROSCOPIC;  Surgeon: Paresh Sampson MD;  Location: [facility] OR;  Service: General;  Laterality: N/A;    TOTAL REDUCTION MAMMOPLASTY      XI ROBOTIC GASTROENTEROSTOMY, KENNETH-EN-Y N/A 05/01/2023    Procedure: XI ROBOTIC GASTROENTEROSTOMY, KENNETH-EN-Y;  Surgeon: Paresh Sampson MD;  Location: [facility] OR;  Service: General;  Laterality: N/A;       Family History   Problem Relation Name  Age of Onset    Heart disease Mother Louisa johnson     Diabetes Mother Louisa johnson     Obesity Mother Louisa johnson     Arthritis Mother Louisa johnson     Depression Mother Louisa johnson     Obesity Sister      Obesity Sister      Breast cancer Maternal Aunt      Diabetes Maternal Aunt      Diabetes Maternal Uncle na     Diabetes Paternal Aunt na     Diabetes Paternal Uncle na     Cancer Maternal Grandmother GLENN JOHNSON     Cancer Maternal Grandfather SHAHRIAR JOHNSON     Cancer Paternal Grandmother CYRENE KELLIE     Cancer Paternal Grandfather none        Social History     Socioeconomic History    Marital status:    Tobacco Use    Smoking status: Former     Current packs/day: 0.00     Average packs/day: 1.5 packs/day for 2.1 years (3.2 ttl pk-yrs)     Types: Cigarettes     Start date: 2020     Quit date: 10/18/2022     Years since quittin.4    Smokeless tobacco: Never   Substance and Sexual Activity    Alcohol use: Yes     Alcohol/week: 10.0 standard drinks of alcohol     Types: 10 Shots of liquor per week     Comment: social drink on wednesday and sometimes on the weekend    Drug use: Not Currently     Types: Marijuana     Comment: quit about 4 years ago    Sexual activity: Yes     Partners: Male     Birth control/protection: None   Social History Narrative        Sales      Social Drivers of Health     Financial Resource Strain: Low Risk  (2025)    Overall Financial Resource Strain (CARDIA)     Difficulty of Paying Living Expenses: Not hard at all   Food Insecurity: Food Insecurity Present (2025)    Hunger Vital Sign     Worried About Running Out of Food in the Last Year: Never true     Ran Out of Food in the Last Year: Sometimes true   Transportation Needs: No Transportation Needs (2025)    PRAPARE - Transportation     Lack of Transportation (Medical): No     Lack of Transportation (Non-Medical): No   Physical Activity: Sufficiently Active (2025)    Exercise Vital Sign      "Days of Exercise per Week: 3 days     Minutes of Exercise per Session: 60 min   Stress: Stress Concern Present (4/14/2025)    [employer] - Occupational Stress Questionnaire     Feeling of Stress : Rather much   Housing Stability: Low Risk  (4/14/2025)    Housing Stability Vital Sign     Unable to Pay for Housing in the Last Year: No     Number of Times Moved in the Last Year: 0     Homeless in the Last Year: No       Current Medications[1]    Review of patient's allergies indicates:   Allergen Reactions    Chocolate low lactose Hives     Hives and throat swelling with Chocolate    Benadryl [diphenhydramine hcl] Hives    Codeine Hives and Nausea And Vomiting     HIVES    Hair - special formula vit/min Itching and Rash     Nutrafol       Physical examination  Vitals Reviewed  BP (!) 142/88   Pulse (!) 54   Temp 98.1 °F (36.7 °C) (Oral)   Ht 5' 5.5" (1.664 m)   Wt 64.3 kg (141 lb 12.1 oz)   LMP 04/15/2024   SpO2 97%   BMI 23.23 kg/m²  Body mass index is 23.23 kg/m².     BP Readings from Last 3 Encounters:   04/14/25 (!) 142/88   02/11/25 (!) 142/85   11/19/24 (!) 146/71       Wt Readings from Last 3 Encounters:   04/14/25 64.3 kg (141 lb 12.1 oz)   02/11/25 64.6 kg (142 lb 6.4 oz)   11/19/24 69.3 kg (152 lb 12.8 oz)     Physical Exam    Chest clear to auscultation cardiac regular rate and rhythm.  S1-S2 noted.No chest wall tenderness to palpation.  No pain with AP pressure.  No adenopathy noted.    Abdomen is soft and not distended bowel sounds active   Extremities no swelling or bruising.    IMAGING:  Ms. White's mammogram is up to date.       This note was generated with the assistance of ambient listening technology. Verbal consent was obtained by the patient and accompanying visitor(s) for the recording of patient appointment to facilitate this note. I attest to having reviewed and edited the generated note for accuracy, though some syntax or spelling errors may persist. Please " contact the author of this note for any clarification.      Call or return to clinic prn if these symptoms worsen or fail to improve as anticipated.           [1]   Current Outpatient Medications   Medication Sig Dispense Refill    busPIRone (BUSPAR) 5 MG Tab Take 1 tablet (5 mg total) by mouth 2 (two) times daily. 60 tablet 11    ergocalciferol (ERGOCALCIFEROL) 50,000 unit Cap Take 1 capsule (50,000 Units total) by mouth every 7 days. for 12 doses 12 capsule 0    hydrOXYzine HCL (ATARAX) 10 MG Tab Take 1 tablet (10 mg total) by mouth 3 (three) times daily as needed (panic attack). 30 tablet 11    losartan (COZAAR) 25 MG tablet TAKE 1 TABLET(25 MG) BY MOUTH DAILY 30 tablet 5    multivit-min/iron/folic acid/K (BARIATRIC MULTIVITAMINS ORAL) Take 1 capsule by mouth Daily.      ondansetron (ZOFRAN-ODT) 4 MG TbDL DISSOLVE 1 TABLET(4 MG) ON THE TONGUE EVERY 6 HOURS AS NEEDED FOR NAUSEA OR VOMITING 30 tablet 0    phentermine (ADIPEX-P) 37.5 mg tablet Take 0.5 tablets (18.75 mg total) by mouth before breakfast. 45 tablet 0    cloNIDine (CATAPRES) 0.1 MG tablet Take 1 tablet (0.1 mg total) by mouth every 6 (six) hours as needed (hot flash or blood pressure >140 systolic). 60 tablet 0     No current facility-administered medications for this visit.

## 2025-04-15 ENCOUNTER — HOSPITAL ENCOUNTER (OUTPATIENT)
Dept: RADIOLOGY | Facility: HOSPITAL | Age: 50
Discharge: HOME | End: 2025-04-15
Attending: NURSE PRACTITIONER
Payer: COMMERCIAL

## 2025-04-15 DIAGNOSIS — R07.89 CHEST WALL PAIN: ICD-10-CM

## 2025-04-15 DIAGNOSIS — Z82.49 FAMILY HISTORY OF ISCHEMIC HEART DISEASE (IHD): ICD-10-CM

## 2025-04-15 PROCEDURE — 71046 X-RAY EXAM CHEST 2 VIEWS: CPT | Mod: TC,PO

## 2025-04-15 PROCEDURE — 71046 X-RAY EXAM CHEST 2 VIEWS: CPT | Mod: 26,,, | Performed by: RADIOLOGY

## 2025-05-12 ENCOUNTER — OFFICE VISIT (OUTPATIENT)
Dept: BARIATRICS | Facility: CLINIC | Age: 50
End: 2025-05-12
Payer: COMMERCIAL

## 2025-05-12 VITALS
DIASTOLIC BLOOD PRESSURE: 70 MMHG | RESPIRATION RATE: 16 BRPM | HEART RATE: 98 BPM | SYSTOLIC BLOOD PRESSURE: 107 MMHG | WEIGHT: 135.38 LBS | BODY MASS INDEX: 21.76 KG/M2 | TEMPERATURE: 98 F | HEIGHT: 66 IN

## 2025-05-12 DIAGNOSIS — E46 PROTEIN DEFICIENCY: Primary | ICD-10-CM

## 2025-05-12 DIAGNOSIS — E55.9 VITAMIN D INSUFFICIENCY: ICD-10-CM

## 2025-05-12 PROCEDURE — 1159F MED LIST DOCD IN RCRD: CPT | Mod: CPTII,S$GLB,, | Performed by: NURSE PRACTITIONER

## 2025-05-12 PROCEDURE — 1160F RVW MEDS BY RX/DR IN RCRD: CPT | Mod: CPTII,S$GLB,, | Performed by: NURSE PRACTITIONER

## 2025-05-12 PROCEDURE — 3008F BODY MASS INDEX DOCD: CPT | Mod: CPTII,S$GLB,, | Performed by: NURSE PRACTITIONER

## 2025-05-12 PROCEDURE — 99999 PR PBB SHADOW E&M-EST. PATIENT-LVL III: CPT | Mod: PBBFAC,,, | Performed by: NURSE PRACTITIONER

## 2025-05-12 PROCEDURE — 99214 OFFICE O/P EST MOD 30 MIN: CPT | Mod: S$GLB,,, | Performed by: NURSE PRACTITIONER

## 2025-05-12 PROCEDURE — 3078F DIAST BP <80 MM HG: CPT | Mod: CPTII,S$GLB,, | Performed by: NURSE PRACTITIONER

## 2025-05-12 PROCEDURE — 3074F SYST BP LT 130 MM HG: CPT | Mod: CPTII,S$GLB,, | Performed by: NURSE PRACTITIONER

## 2025-05-12 RX ORDER — ERGOCALCIFEROL 1.25 MG/1
50000 CAPSULE ORAL
Qty: 12 CAPSULE | Refills: 0 | Status: SHIPPED | OUTPATIENT
Start: 2025-05-12 | End: 2025-07-29

## 2025-05-12 NOTE — PROGRESS NOTES
Post Op Note    Surgery: gastric bypass surgery  Date: 5/1/23  Initial weight: 226  Last weight: 142  Current weight: 135   Goal: 135    Constipation: none  Reflux: none  Vomiting: none  Nausea: after eating, when overfill    Diet:  Breakfast: Coffee with atkins shake, Yogurt- Oui (1/2 at time)- 1/2 bagel with Low Fat cream cheese/salmon  Snack: Kind bar (1/2)  Lunch: prep salad with greek or italian with boiled meat  Dinner:  Home  box   Water- 60-80 oz  Protein > 60 gm/d    Exercise:  Limited  Exercising at work and some at gym intermittently    MVI:   daily mvi with iron  B12 1/2 tablet  Calcium + Vit D    Vitals:    05/12/25 0955   BP: 107/70   Pulse: 98   Resp: 16   Temp: 98.1 °F (36.7 °C)       Body comp:  Fat Percent:  29.9 %  Fat Mass:  40.4 lb  FFM:  95 lb  TBW: 65.6 lb  TBW %:  48.4 %  BMR: 1283 kcal    PE:  NAD  RRR  Soft/nt/nd    Labs: done in February    A/P: s/p gastric bypass     Counseling for patient:  Falls Reviewed  Continue with prescribed home medications  Tanita Body Scale Reviewed  Decreased in 1.5 lb fat  Decrease 5 lb muscle  Decreased 5 lb water  Diet: continue low carb dieting  No skipping meals  Increase to 70 gm/protein per day- ensure 60 g/day minimum  Limit carbs  Exercise/ Activity  Increase as much as possible to ensure weight loss goa  GERD vs nausea- take Tums PRN before nausea medications, do not skip    Medical Weight Loss options  Previously [facility] completed PO treatment:  Phentermine- tried a whole tablet with minimal weight loss  Topamax- gave bad side effects  [facility] Injectables-  Initiated by Dr. Sampson  Currently on Wegovy 1 mg  Weekly injections on Sundays with more cheats through weekend  Discussed transitioning to Friday injections  Change in dose can cause headache- use Acetaminophen/Tylenol, not BC Powders  Reflux vs Nausea- take tums first, then Zofran if persists  1 mg refill given today with 1 additional refill  9/30/24-   Discussion of goal weight reached,  transition to maintenance   Continue at same dose, extend injections to every 10 days  Has 1 refill still at pharmacy, will send message to staff for refills    11/19/24  Recent Wegovy Rx given, difficulties with prior auth- has not had in 2 weeks  PA received on 11/18/24 see Media in EPIC  Will Transfer to [facility]    2/11/2025  Labs reviewed- Trend Bili  Swap B12 for B complex  Add Calcium + Vitamin D into vitamin regimen  Rx in Vitamin D  Dc'd Wegovy- d/t increase cost this year ($1100/month, from insurance deductible)  Start Phentermine 1/2 tablet (18.75 mg) qday-- aware only 6 months will be prescribed  F/U in 3 months    Changes for today, 5/12/25  Refill Rx Vitamin D  No refill needed on Phentermine, reporting using PRN  Discussed fat loss vs muscle loss- need to increase activity    Co morbidities:     1. Protein deficiency        2. Vitamin D insufficiency  ergocalciferol (ERGOCALCIFEROL) 50,000 unit Cap      3. Low serum vitamin D              -All above: Evaluated, monitored, and treated with diet and exercise program.

## 2025-05-13 ENCOUNTER — PATIENT MESSAGE (OUTPATIENT)
Dept: FAMILY MEDICINE | Facility: CLINIC | Age: 50
End: 2025-05-13
Payer: COMMERCIAL

## 2025-06-17 DIAGNOSIS — R11.0 NAUSEA: ICD-10-CM

## 2025-06-17 RX ORDER — ONDANSETRON 4 MG/1
4 TABLET, ORALLY DISINTEGRATING ORAL EVERY 6 HOURS PRN
Qty: 30 TABLET | Refills: 0 | Status: SHIPPED | OUTPATIENT
Start: 2025-06-17 | End: 2025-07-17

## 2025-06-24 ENCOUNTER — PATIENT MESSAGE (OUTPATIENT)
Dept: BARIATRICS | Facility: CLINIC | Age: 50
End: 2025-06-24
Payer: COMMERCIAL

## 2025-06-24 DIAGNOSIS — Z98.84 HISTORY OF GASTRIC BYPASS: Chronic | ICD-10-CM

## 2025-06-24 DIAGNOSIS — E78.5 DYSLIPIDEMIA: ICD-10-CM

## 2025-06-24 DIAGNOSIS — E66.811 CLASS 1 OBESITY DUE TO EXCESS CALORIES WITH SERIOUS COMORBIDITY AND BODY MASS INDEX (BMI) OF 30.0 TO 30.9 IN ADULT: Primary | Chronic | ICD-10-CM

## 2025-06-24 DIAGNOSIS — E66.09 CLASS 1 OBESITY DUE TO EXCESS CALORIES WITH SERIOUS COMORBIDITY AND BODY MASS INDEX (BMI) OF 30.0 TO 30.9 IN ADULT: Primary | Chronic | ICD-10-CM

## 2025-06-26 RX ORDER — SEMAGLUTIDE 0.25 MG/.5ML
0.25 INJECTION, SOLUTION SUBCUTANEOUS
Qty: 2 ML | Refills: 0 | Status: SHIPPED | OUTPATIENT
Start: 2025-06-26

## 2025-07-09 DIAGNOSIS — R11.0 NAUSEA: ICD-10-CM

## 2025-07-10 RX ORDER — ONDANSETRON 4 MG/1
TABLET, ORALLY DISINTEGRATING ORAL
Qty: 18 TABLET | Refills: 1 | Status: SHIPPED | OUTPATIENT
Start: 2025-07-10

## 2025-08-21 ENCOUNTER — OFFICE VISIT (OUTPATIENT)
Dept: BARIATRICS | Facility: CLINIC | Age: 50
End: 2025-08-21
Payer: COMMERCIAL

## 2025-08-21 VITALS
BODY MASS INDEX: 22.12 KG/M2 | WEIGHT: 137.63 LBS | SYSTOLIC BLOOD PRESSURE: 139 MMHG | RESPIRATION RATE: 16 BRPM | TEMPERATURE: 98 F | HEART RATE: 51 BPM | DIASTOLIC BLOOD PRESSURE: 86 MMHG | HEIGHT: 66 IN

## 2025-08-21 DIAGNOSIS — Z98.84 HISTORY OF GASTRIC BYPASS: ICD-10-CM

## 2025-08-21 DIAGNOSIS — Z13.21 ENCOUNTER FOR VITAMIN DEFICIENCY SCREENING: ICD-10-CM

## 2025-08-21 DIAGNOSIS — R11.0 NAUSEA: ICD-10-CM

## 2025-08-21 DIAGNOSIS — Z71.3 NUTRITIONAL COUNSELING: ICD-10-CM

## 2025-08-21 DIAGNOSIS — R63.5 WEIGHT GAIN: Primary | ICD-10-CM

## 2025-08-21 DIAGNOSIS — E55.9 VITAMIN D INSUFFICIENCY: ICD-10-CM

## 2025-08-21 DIAGNOSIS — E78.5 DYSLIPIDEMIA: ICD-10-CM

## 2025-08-21 PROCEDURE — 1159F MED LIST DOCD IN RCRD: CPT | Mod: CPTII,S$GLB,, | Performed by: NURSE PRACTITIONER

## 2025-08-21 PROCEDURE — 1160F RVW MEDS BY RX/DR IN RCRD: CPT | Mod: CPTII,S$GLB,, | Performed by: NURSE PRACTITIONER

## 2025-08-21 PROCEDURE — 3079F DIAST BP 80-89 MM HG: CPT | Mod: CPTII,S$GLB,, | Performed by: NURSE PRACTITIONER

## 2025-08-21 PROCEDURE — 3075F SYST BP GE 130 - 139MM HG: CPT | Mod: CPTII,S$GLB,, | Performed by: NURSE PRACTITIONER

## 2025-08-21 PROCEDURE — 99999 PR PBB SHADOW E&M-EST. PATIENT-LVL III: CPT | Mod: PBBFAC,,, | Performed by: NURSE PRACTITIONER

## 2025-08-21 PROCEDURE — 99214 OFFICE O/P EST MOD 30 MIN: CPT | Mod: S$GLB,,, | Performed by: NURSE PRACTITIONER

## 2025-08-21 PROCEDURE — 3008F BODY MASS INDEX DOCD: CPT | Mod: CPTII,S$GLB,, | Performed by: NURSE PRACTITIONER

## 2025-08-21 RX ORDER — ONDANSETRON 4 MG/1
TABLET, ORALLY DISINTEGRATING ORAL
Qty: 18 TABLET | Refills: 1 | Status: SHIPPED | OUTPATIENT
Start: 2025-08-21

## 2025-08-21 RX ORDER — PHENTERMINE HYDROCHLORIDE 37.5 MG/1
18.75 TABLET ORAL
Qty: 45 TABLET | Refills: 0 | Status: SHIPPED | OUTPATIENT
Start: 2025-08-21 | End: 2025-11-19

## (undated) DEVICE — LINER SUCTION 3000CC

## (undated) DEVICE — ELECTRODE REM PLYHSV RETURN 9

## (undated) DEVICE — DRAPE COLUMN DAVINCI XI

## (undated) DEVICE — TROCAR ADVANCED FIXATION  CFF03

## (undated) DEVICE — SOLUTION NACL 0.9% 3000ML

## (undated) DEVICE — DRAPE ABDOMINAL TIBURON 14X11

## (undated) DEVICE — SET TUBE PNEUMOCLEAR SE HI FLO

## (undated) DEVICE — SUT V-LOC 180 2-0 GS-22 9IN

## (undated) DEVICE — SEALANT TISSEEL FIBRIN 10ML

## (undated) DEVICE — SUT MONOCRYL 4-0 SH UND MON

## (undated) DEVICE — DRAPE STERI INSTRUMENT 1018

## (undated) DEVICE — PACK CUSTOM UNIV BASIN SLI

## (undated) DEVICE — SEAL UNIVERSAL 5MM-8MM XI

## (undated) DEVICE — STRIP MEDI WND CLSR 1/2X4IN

## (undated) DEVICE — SUT MONOCRYL 4-0 PS-2

## (undated) DEVICE — COVER TIP CURVED SCISSORS XI

## (undated) DEVICE — GLOVE SURG ULTRA TOUCH 7.5

## (undated) DEVICE — IRRIGATOR ENDOWRIST XI SUCTION

## (undated) DEVICE — GOWN POLY REINF BRTH SLV XL

## (undated) DEVICE — TOWEL OR DISP STRL BLUE 4/PK

## (undated) DEVICE — SEALER LIGASURE MARYLAND 37CM

## (undated) DEVICE — SCISSOR 5MMX35CM DIRECT DRIVE

## (undated) DEVICE — TRAY GENERAL LAPAROSCOPY

## (undated) DEVICE — APPLICATOR CHLORAPREP ORN 26ML

## (undated) DEVICE — BLADE SURG CARBON STEEL SZ11

## (undated) DEVICE — PAD PINK TRENDELENBURG POS XL

## (undated) DEVICE — GLOVE BIOGEL PI ULTRA TOUCH GRAY SZ7.5

## (undated) DEVICE — APPLICATOR GSLS SPRAY TIP 40CM

## (undated) DEVICE — SUCTION/IRRIGATOR W/TIP

## (undated) DEVICE — SOL CLEARIFY VISUALIZATION LAP

## (undated) DEVICE — PACK SIRUS BASIC V SURG STRL

## (undated) DEVICE — COVER PROXIMA MAYO STAND

## (undated) DEVICE — SOL IRR NACL .9% 3000ML

## (undated) DEVICE — SUT V-LOC 2.0 GS-21 90 DAY

## (undated) DEVICE — CANNULA SEAL 12MM

## (undated) DEVICE — SLEEVE SCD EXPRESS KNEE MEDIUM

## (undated) DEVICE — SOL ELECTROLUBE ANTI-STIC

## (undated) DEVICE — TRAY CATH FOL SIL URIMTR 16FR

## (undated) DEVICE — TROCAR OPTICAL ZTHREAD 12MMX100MM CTF73

## (undated) DEVICE — Device

## (undated) DEVICE — SUTURE MONOCRYL 4-0 27 SH MCP415H

## (undated) DEVICE — TROCAR KII FIOS 5MM X 100MM

## (undated) DEVICE — NDL SPINAL 18GX3.5 SPINOCAN

## (undated) DEVICE — TROCAR KII FIOS 12MM X 100MM

## (undated) DEVICE — SCISSORS 5MM APPLIED MEDICAL   CB030

## (undated) DEVICE — SUT 0 VICRYL / UR6 (J603)

## (undated) DEVICE — ADHESIVE TISSUE EXOFIN

## (undated) DEVICE — STAPLER SUREFORM 60 SPU

## (undated) DEVICE — STRAP OR TABLE 5IN X 72IN

## (undated) DEVICE — IRRIGATOR SUCTION W/TIP

## (undated) DEVICE — SUTURE SILK 2-0 SH 30 K833H

## (undated) DEVICE — DRAPE ARM DAVINCI XI

## (undated) DEVICE — CLOSURE SKIN STERI STRIP 1/2X4

## (undated) DEVICE — PAD BOVIE ADULT

## (undated) DEVICE — GOWN POLY REINF BRTH SLV LG

## (undated) DEVICE — SUT CTD VICRYL VIL BR SH 27

## (undated) DEVICE — KIT PROCEDURE STER INLET CLOSU

## (undated) DEVICE — SYR 50CC LL

## (undated) DEVICE — SUT 3-0 VICRYL / SH (J416)

## (undated) DEVICE — SLEEVE GSTRCTMY VISIGI 3D 36FR

## (undated) DEVICE — CANNULA REDUCER 12-8MM

## (undated) DEVICE — OBTURATOR BLADELESS 8MM XI CLR

## (undated) DEVICE — SUT VLOC 2-0 6IN 1/2 CIRCLE TP

## (undated) DEVICE — NDL PNEUMO INSUFFLATI 120MM